# Patient Record
Sex: FEMALE | Race: WHITE | Employment: OTHER | URBAN - METROPOLITAN AREA
[De-identification: names, ages, dates, MRNs, and addresses within clinical notes are randomized per-mention and may not be internally consistent; named-entity substitution may affect disease eponyms.]

---

## 2017-01-13 PROBLEM — J44.9 COPD (CHRONIC OBSTRUCTIVE PULMONARY DISEASE) (HCC): Chronic | Status: ACTIVE | Noted: 2017-01-13

## 2017-10-08 PROBLEM — N39.0 COMPLICATED UTI (URINARY TRACT INFECTION): Status: ACTIVE | Noted: 2017-10-08

## 2018-06-04 ENCOUNTER — NURSE ONLY (OUTPATIENT)
Dept: NON INVASIVE DIAGNOSTICS | Age: 83
End: 2018-06-04
Payer: MEDICARE

## 2018-06-04 DIAGNOSIS — Z95.0 PACEMAKER: Primary | Chronic | ICD-10-CM

## 2018-06-04 PROCEDURE — 93294 REM INTERROG EVL PM/LDLS PM: CPT | Performed by: INTERNAL MEDICINE

## 2018-06-04 PROCEDURE — 93296 REM INTERROG EVL PM/IDS: CPT | Performed by: INTERNAL MEDICINE

## 2018-06-12 ENCOUNTER — TELEPHONE (OUTPATIENT)
Dept: NON INVASIVE DIAGNOSTICS | Age: 83
End: 2018-06-12

## 2018-07-24 ENCOUNTER — APPOINTMENT (OUTPATIENT)
Dept: CT IMAGING | Age: 83
DRG: 689 | End: 2018-07-24
Payer: MEDICARE

## 2018-07-24 ENCOUNTER — HOSPITAL ENCOUNTER (INPATIENT)
Age: 83
LOS: 5 days | Discharge: HOME HEALTH | DRG: 689 | End: 2018-07-29
Attending: EMERGENCY MEDICINE | Admitting: INTERNAL MEDICINE
Payer: MEDICARE

## 2018-07-24 DIAGNOSIS — R10.30 LOWER ABDOMINAL PAIN: ICD-10-CM

## 2018-07-24 DIAGNOSIS — N30.00 ACUTE CYSTITIS WITHOUT HEMATURIA: Primary | ICD-10-CM

## 2018-07-24 DIAGNOSIS — K86.89 PANCREATIC MASS: ICD-10-CM

## 2018-07-24 DIAGNOSIS — N39.0 URINARY TRACT INFECTION WITHOUT HEMATURIA, SITE UNSPECIFIED: ICD-10-CM

## 2018-07-24 PROBLEM — N30.90 CYSTITIS: Status: ACTIVE | Noted: 2018-07-24

## 2018-07-24 LAB
ALBUMIN SERPL-MCNC: 3.8 G/DL (ref 3.5–5.2)
ALP BLD-CCNC: 124 U/L (ref 35–104)
ALT SERPL-CCNC: 19 U/L (ref 0–32)
ANION GAP SERPL CALCULATED.3IONS-SCNC: 14 MMOL/L (ref 7–16)
AST SERPL-CCNC: 32 U/L (ref 0–31)
BACTERIA: ABNORMAL /HPF
BASOPHILS ABSOLUTE: 0.05 E9/L (ref 0–0.2)
BASOPHILS RELATIVE PERCENT: 0.4 % (ref 0–2)
BILIRUB SERPL-MCNC: 0.7 MG/DL (ref 0–1.2)
BILIRUBIN URINE: NEGATIVE
BLOOD, URINE: ABNORMAL
BUN BLDV-MCNC: 14 MG/DL (ref 8–23)
CALCIUM SERPL-MCNC: 9.1 MG/DL (ref 8.6–10.2)
CHLORIDE BLD-SCNC: 96 MMOL/L (ref 98–107)
CLARITY: CLEAR
CO2: 23 MMOL/L (ref 22–29)
COLOR: YELLOW
CREAT SERPL-MCNC: 0.8 MG/DL (ref 0.5–1)
EOSINOPHILS ABSOLUTE: 0.18 E9/L (ref 0.05–0.5)
EOSINOPHILS RELATIVE PERCENT: 1.5 % (ref 0–6)
GFR AFRICAN AMERICAN: >60
GFR NON-AFRICAN AMERICAN: >60 ML/MIN/1.73
GLUCOSE BLD-MCNC: 121 MG/DL (ref 74–109)
GLUCOSE URINE: NEGATIVE MG/DL
HCT VFR BLD CALC: 40 % (ref 34–48)
HEMOGLOBIN: 13.4 G/DL (ref 11.5–15.5)
IMMATURE GRANULOCYTES #: 0.08 E9/L
IMMATURE GRANULOCYTES %: 0.7 % (ref 0–5)
KETONES, URINE: NEGATIVE MG/DL
LACTIC ACID: 1.2 MMOL/L (ref 0.5–2.2)
LEUKOCYTE ESTERASE, URINE: ABNORMAL
LIPASE: 90 U/L (ref 13–60)
LYMPHOCYTES ABSOLUTE: 1.84 E9/L (ref 1.5–4)
LYMPHOCYTES RELATIVE PERCENT: 15.3 % (ref 20–42)
MCH RBC QN AUTO: 27.1 PG (ref 26–35)
MCHC RBC AUTO-ENTMCNC: 33.5 % (ref 32–34.5)
MCV RBC AUTO: 81 FL (ref 80–99.9)
MONOCYTES ABSOLUTE: 1.47 E9/L (ref 0.1–0.95)
MONOCYTES RELATIVE PERCENT: 12.2 % (ref 2–12)
NEUTROPHILS ABSOLUTE: 8.39 E9/L (ref 1.8–7.3)
NEUTROPHILS RELATIVE PERCENT: 69.9 % (ref 43–80)
NITRITE, URINE: POSITIVE
PDW BLD-RTO: 14.2 FL (ref 11.5–15)
PH UA: 6 (ref 5–9)
PLATELET # BLD: 251 E9/L (ref 130–450)
PMV BLD AUTO: 10.5 FL (ref 7–12)
POTASSIUM SERPL-SCNC: 4 MMOL/L (ref 3.5–5)
PROTEIN UA: ABNORMAL MG/DL
RBC # BLD: 4.94 E12/L (ref 3.5–5.5)
RBC UA: ABNORMAL /HPF (ref 0–2)
SODIUM BLD-SCNC: 133 MMOL/L (ref 132–146)
SPECIFIC GRAVITY UA: 1.01 (ref 1–1.03)
TOTAL PROTEIN: 7.2 G/DL (ref 6.4–8.3)
UROBILINOGEN, URINE: 0.2 E.U./DL
WBC # BLD: 12 E9/L (ref 4.5–11.5)
WBC UA: >20 /HPF (ref 0–5)

## 2018-07-24 PROCEDURE — 6360000002 HC RX W HCPCS: Performed by: INTERNAL MEDICINE

## 2018-07-24 PROCEDURE — 6370000000 HC RX 637 (ALT 250 FOR IP): Performed by: INTERNAL MEDICINE

## 2018-07-24 PROCEDURE — 87088 URINE BACTERIA CULTURE: CPT

## 2018-07-24 PROCEDURE — 2580000003 HC RX 258: Performed by: INTERNAL MEDICINE

## 2018-07-24 PROCEDURE — 99285 EMERGENCY DEPT VISIT HI MDM: CPT

## 2018-07-24 PROCEDURE — 97161 PT EVAL LOW COMPLEX 20 MIN: CPT

## 2018-07-24 PROCEDURE — 97530 THERAPEUTIC ACTIVITIES: CPT

## 2018-07-24 PROCEDURE — 6360000004 HC RX CONTRAST MEDICATION: Performed by: RADIOLOGY

## 2018-07-24 PROCEDURE — 97165 OT EVAL LOW COMPLEX 30 MIN: CPT

## 2018-07-24 PROCEDURE — 2580000003 HC RX 258: Performed by: EMERGENCY MEDICINE

## 2018-07-24 PROCEDURE — 85025 COMPLETE CBC W/AUTO DIFF WBC: CPT

## 2018-07-24 PROCEDURE — 94760 N-INVAS EAR/PLS OXIMETRY 1: CPT

## 2018-07-24 PROCEDURE — 1200000000 HC SEMI PRIVATE

## 2018-07-24 PROCEDURE — 83605 ASSAY OF LACTIC ACID: CPT

## 2018-07-24 PROCEDURE — G8987 SELF CARE CURRENT STATUS: HCPCS

## 2018-07-24 PROCEDURE — 96374 THER/PROPH/DIAG INJ IV PUSH: CPT

## 2018-07-24 PROCEDURE — G8988 SELF CARE GOAL STATUS: HCPCS

## 2018-07-24 PROCEDURE — 6360000002 HC RX W HCPCS: Performed by: EMERGENCY MEDICINE

## 2018-07-24 PROCEDURE — 83690 ASSAY OF LIPASE: CPT

## 2018-07-24 PROCEDURE — 87186 SC STD MICRODIL/AGAR DIL: CPT

## 2018-07-24 PROCEDURE — 74170 CT ABD WO CNTRST FLWD CNTRST: CPT

## 2018-07-24 PROCEDURE — 87040 BLOOD CULTURE FOR BACTERIA: CPT

## 2018-07-24 PROCEDURE — 80053 COMPREHEN METABOLIC PANEL: CPT

## 2018-07-24 PROCEDURE — 81001 URINALYSIS AUTO W/SCOPE: CPT

## 2018-07-24 PROCEDURE — 96375 TX/PRO/DX INJ NEW DRUG ADDON: CPT

## 2018-07-24 RX ORDER — FENTANYL CITRATE 50 UG/ML
50 INJECTION, SOLUTION INTRAMUSCULAR; INTRAVENOUS ONCE
Status: COMPLETED | OUTPATIENT
Start: 2018-07-24 | End: 2018-07-24

## 2018-07-24 RX ORDER — SODIUM CHLORIDE 0.9 % (FLUSH) 0.9 %
10 SYRINGE (ML) INJECTION EVERY 12 HOURS SCHEDULED
Status: DISCONTINUED | OUTPATIENT
Start: 2018-07-24 | End: 2018-07-29 | Stop reason: HOSPADM

## 2018-07-24 RX ORDER — ONDANSETRON 2 MG/ML
4 INJECTION INTRAMUSCULAR; INTRAVENOUS EVERY 6 HOURS PRN
Status: DISCONTINUED | OUTPATIENT
Start: 2018-07-24 | End: 2018-07-29 | Stop reason: HOSPADM

## 2018-07-24 RX ORDER — SODIUM CHLORIDE 0.9 % (FLUSH) 0.9 %
10 SYRINGE (ML) INJECTION PRN
Status: DISCONTINUED | OUTPATIENT
Start: 2018-07-24 | End: 2018-07-29 | Stop reason: HOSPADM

## 2018-07-24 RX ORDER — CEFDINIR 300 MG/1
300 CAPSULE ORAL ONCE
COMMUNITY
End: 2018-07-24

## 2018-07-24 RX ORDER — ONDANSETRON 2 MG/ML
4 INJECTION INTRAMUSCULAR; INTRAVENOUS ONCE
Status: COMPLETED | OUTPATIENT
Start: 2018-07-24 | End: 2018-07-24

## 2018-07-24 RX ORDER — LOSARTAN POTASSIUM 50 MG/1
100 TABLET ORAL DAILY
Status: DISCONTINUED | OUTPATIENT
Start: 2018-07-24 | End: 2018-07-29 | Stop reason: HOSPADM

## 2018-07-24 RX ORDER — PANTOPRAZOLE SODIUM 40 MG/1
40 TABLET, DELAYED RELEASE ORAL
Status: DISCONTINUED | OUTPATIENT
Start: 2018-07-25 | End: 2018-07-29 | Stop reason: HOSPADM

## 2018-07-24 RX ORDER — ACETAMINOPHEN 325 MG/1
650 TABLET ORAL EVERY 4 HOURS PRN
Status: DISCONTINUED | OUTPATIENT
Start: 2018-07-24 | End: 2018-07-29 | Stop reason: HOSPADM

## 2018-07-24 RX ORDER — LIDOCAINE HCL 4 %
1 CREAM (GRAM) TOPICAL DAILY PRN
COMMUNITY
End: 2018-11-19

## 2018-07-24 RX ORDER — SODIUM CHLORIDE 9 MG/ML
INJECTION, SOLUTION INTRAVENOUS CONTINUOUS
Status: DISCONTINUED | OUTPATIENT
Start: 2018-07-24 | End: 2018-07-28

## 2018-07-24 RX ORDER — MAGNESIUM HYDROXIDE/ALUMINUM HYDROXICE/SIMETHICONE 120; 1200; 1200 MG/30ML; MG/30ML; MG/30ML
15 SUSPENSION ORAL EVERY 6 HOURS PRN
Status: DISCONTINUED | OUTPATIENT
Start: 2018-07-24 | End: 2018-07-29 | Stop reason: HOSPADM

## 2018-07-24 RX ORDER — TRIAMTERENE AND HYDROCHLOROTHIAZIDE 37.5; 25 MG/1; MG/1
1 TABLET ORAL EVERY MORNING
Status: DISCONTINUED | OUTPATIENT
Start: 2018-07-24 | End: 2018-07-29 | Stop reason: HOSPADM

## 2018-07-24 RX ORDER — ALUMINA, MAGNESIA, AND SIMETHICONE 2400; 2400; 240 MG/30ML; MG/30ML; MG/30ML
30 SUSPENSION ORAL EVERY 6 HOURS PRN
COMMUNITY
End: 2020-01-25

## 2018-07-24 RX ORDER — ACETAMINOPHEN, ASPIRIN AND CAFFEINE 250; 250; 65 MG/1; MG/1; MG/1
1 TABLET, FILM COATED ORAL EVERY 8 HOURS PRN
Status: DISCONTINUED | OUTPATIENT
Start: 2018-07-24 | End: 2018-07-29 | Stop reason: HOSPADM

## 2018-07-24 RX ADMIN — FENTANYL CITRATE 50 MCG: 50 INJECTION, SOLUTION INTRAMUSCULAR; INTRAVENOUS at 08:58

## 2018-07-24 RX ADMIN — IOPAMIDOL 110 ML: 755 INJECTION, SOLUTION INTRAVENOUS at 08:34

## 2018-07-24 RX ADMIN — ENOXAPARIN SODIUM 40 MG: 40 INJECTION, SOLUTION INTRAVENOUS; SUBCUTANEOUS at 12:06

## 2018-07-24 RX ADMIN — ACETAMINOPHEN 650 MG: 325 TABLET ORAL at 22:25

## 2018-07-24 RX ADMIN — Medication 10 ML: at 12:05

## 2018-07-24 RX ADMIN — ONDANSETRON 4 MG: 2 INJECTION, SOLUTION INTRAMUSCULAR; INTRAVENOUS at 08:01

## 2018-07-24 RX ADMIN — SODIUM CHLORIDE 75 ML/HR: 9 INJECTION, SOLUTION INTRAVENOUS at 12:05

## 2018-07-24 RX ADMIN — CEFEPIME HYDROCHLORIDE 1 G: 1 INJECTION, POWDER, FOR SOLUTION INTRAMUSCULAR; INTRAVENOUS at 22:18

## 2018-07-24 RX ADMIN — TRIAMTERENE AND HYDROCHLOROTHIAZIDE 1 TABLET: 37.5; 25 TABLET ORAL at 13:42

## 2018-07-24 RX ADMIN — CEFEPIME HYDROCHLORIDE 1 G: 1 INJECTION, POWDER, FOR SOLUTION INTRAMUSCULAR; INTRAVENOUS at 10:41

## 2018-07-24 RX ADMIN — ACETAMINOPHEN 650 MG: 325 TABLET ORAL at 16:56

## 2018-07-24 ASSESSMENT — PAIN DESCRIPTION - ORIENTATION
ORIENTATION: MID
ORIENTATION: LOWER

## 2018-07-24 ASSESSMENT — ENCOUNTER SYMPTOMS
COUGH: 0
BACK PAIN: 0
EYE DISCHARGE: 0
SINUS PRESSURE: 0
EYE PAIN: 0
RHINORRHEA: 0
WHEEZING: 0
ABDOMINAL DISTENTION: 0
DIARRHEA: 0
ABDOMINAL PAIN: 1
BLOOD IN STOOL: 0
EYE REDNESS: 0
CONSTIPATION: 0
SHORTNESS OF BREATH: 0
VOMITING: 0
SORE THROAT: 0
NAUSEA: 1

## 2018-07-24 ASSESSMENT — PAIN DESCRIPTION - PROGRESSION
CLINICAL_PROGRESSION: NOT CHANGED
CLINICAL_PROGRESSION: NOT CHANGED

## 2018-07-24 ASSESSMENT — PAIN SCALES - GENERAL
PAINLEVEL_OUTOF10: 3
PAINLEVEL_OUTOF10: 0
PAINLEVEL_OUTOF10: 7
PAINLEVEL_OUTOF10: 7
PAINLEVEL_OUTOF10: 3
PAINLEVEL_OUTOF10: 3
PAINLEVEL_OUTOF10: 4

## 2018-07-24 ASSESSMENT — PAIN DESCRIPTION - FREQUENCY
FREQUENCY: INTERMITTENT
FREQUENCY: CONTINUOUS
FREQUENCY: INTERMITTENT
FREQUENCY: CONTINUOUS

## 2018-07-24 ASSESSMENT — PAIN DESCRIPTION - DESCRIPTORS
DESCRIPTORS: CONSTANT;BURNING
DESCRIPTORS: BURNING;ACHING
DESCRIPTORS: ACHING;BURNING;CONSTANT
DESCRIPTORS: ACHING;BURNING;STABBING
DESCRIPTORS: BURNING;ACHING;SHOOTING

## 2018-07-24 ASSESSMENT — PAIN DESCRIPTION - ONSET: ONSET: ON-GOING

## 2018-07-24 ASSESSMENT — PAIN DESCRIPTION - LOCATION
LOCATION: VAGINA;PELVIS
LOCATION: ABDOMEN
LOCATION: ABDOMEN;VAGINA
LOCATION: ABDOMEN;GROIN
LOCATION: ABDOMEN;VAGINA

## 2018-07-24 ASSESSMENT — PAIN DESCRIPTION - PAIN TYPE
TYPE: ACUTE PAIN

## 2018-07-24 NOTE — ED PROVIDER NOTES
Exam   Constitutional: She is oriented to person, place, and time. She appears well-developed and well-nourished. HENT:   Head: Normocephalic and atraumatic. Head is without raccoon's eyes and without Anderson's sign. Nose: No rhinorrhea or nasal deformity. Mouth/Throat: Uvula is midline, oropharynx is clear and moist and mucous membranes are normal.   Eyes: Conjunctivae and EOM are normal. Pupils are equal, round, and reactive to light. Neck: Trachea normal and normal range of motion. Neck supple. No JVD present. Cardiovascular: Normal rate, regular rhythm and normal heart sounds. Exam reveals no gallop. No murmur heard. Pulmonary/Chest: Effort normal and breath sounds normal. No respiratory distress. She has no wheezes. She has no rales. Abdominal: Soft. Bowel sounds are normal. There is tenderness in the left lower quadrant. There is CVA tenderness (Left-sided). There is no rigidity, no rebound and no guarding. Musculoskeletal: She exhibits no edema. Neurological: She is alert and oriented to person, place, and time. She has normal strength. No cranial nerve deficit or sensory deficit. Skin: Skin is warm and dry. Nursing note and vitals reviewed. Procedures    MDM  Number of Diagnoses or Management Options  Acute cystitis without hematuria:   Lower abdominal pain:   Pancreatic mass:   Urinary tract infection without hematuria, site unspecified:   Diagnosis management comments: Patient is found to have cystitis along with flank pain and multiple antibiotics including ciprofloxacin and Cefdinir within the past couple of months for UTIs. Cultures are obtained and she is started on antibiotic. She is admitted in stable condition for continued workup, treatment, and monitoring of her condition.               --------------------------------------------- PAST HISTORY ---------------------------------------------  Past Medical History:  has a past medical history of Arthritis;  Asthma; AVB right colectomy. 3. No obstructive uropathy or urinary stone. 4. Status post prior cholecystectomy and hysterectomy. 5. Small to moderate size hiatal hernia. 6. Diffuse hepatic steatosis. 7. 1 cm cystic structure of the pancreatic head could represent a   pancreatic cyst versus cystic mass. Follow-up is recommended. 8. Hyperplastic adrenal glands. ------------------------- NURSING NOTES AND VITALS REVIEWED ---------------------------  Date / Time Roomed:  7/24/2018  6:45 AM  ED Bed Assignment:  25/25    The nursing notes within the ED encounter and vital signs as below have been reviewed. Patient Vitals for the past 24 hrs:   BP Temp Pulse Resp SpO2 Height Weight   07/24/18 0949 (!) 104/57 - 72 16 97 % - -   07/24/18 0804 135/75 - 69 16 96 % - -   07/24/18 0651 (!) 173/77 97.6 °F (36.4 °C) 70 16 97 % 5' 1\" (1.549 m) 153 lb (69.4 kg)       Oxygen Saturation Interpretation: Normal    ------------------------------------------ PROGRESS NOTES ------------------------------------------  Re-evaluation(s):  Time: 7:42 AM  Patients symptoms show no change  Repeat physical examination is not changed    Time: 9:20 AM  Patients symptoms are improving  Repeat physical examination is not changed    Time: 10:39 AM  Patients symptoms show no change  Repeat physical examination is not changed    Counseling:  I have spoken with the patient and discussed todays results, in addition to providing specific details for the plan of care and counseling regarding the diagnosis and prognosis. Their questions are answered at this time and they are agreeable with the plan of admission.    --------------------------------- ADDITIONAL PROVIDER NOTES ---------------------------------  Consultations:  Time: 10:42 AM. Spoke with Dr. Karel Jonas. Discussed case. They will admit the patient.   This patient's ED course included: a personal history and physicial examination, multiple bedside re-evaluations, IV medications and continuous pulse oximetry    This patient has remained hemodynamically stable during their ED course. Diagnosis:  1. Acute cystitis without hematuria    2. Urinary tract infection without hematuria, site unspecified    3. Lower abdominal pain    4. Pancreatic mass        Disposition:  Patient's disposition: Admit to med/surg floor  Patient's condition is stable.          Matias Wilson DO  Resident  07/24/18 1043

## 2018-07-24 NOTE — CONSULTS
Consult to Infectious Disease  Consult performed by: Nany Robert ordered by: David Cani, [address] [facility]  [address]  Consultation Note     Admit Date: 7/24/2018  6:45 AM    Reason for Consult:   Complicated UTI    Attending Physician:  Clarissa Garza DO    HISTORY OF PRESENT ILLNESS:             The history is obtained from extensive review of available past medical records. The patient is a 80 y.o. female who is known to the ID service. The patient was treated by Dr. Subhash Davis for a complicated UTI secondary to MDR E. coli in October 2017. She was treated with Ertapenem. The patient was referred back to the office in March 2018 and was treated with Cefdinir. Patient was seen again in the office on 6/27/18 after she has finished a course of Ciprofloxacin for an abnormal urinalysis with pyuria. Patient was asymptomatic and was not given any antibiotics. The patient presents to the ED on 7/24/15 complaining of dysuria, characterized by frequency and burning on urination, together with suprapubic pain. She was admitted with a diagnosis of acute cystitis without hematuria and treated with Cefepime.     Past Medical History:        Diagnosis Date    Arthritis     Asthma     AVB (atrioventricular block) 6/20/2012    Cancer (HCC)     colon    Chest pain 7/11/2013    Chronic kidney disease, stage 3     begining of stage 3    Complicated UTI (urinary tract infection) 10/8/2017    COPD [facility]ch[facility]) [facility]     COPD (chronic obstructive pulmonary disease) ([facility] [address] 1/13/2017    GERD (gastroesophageal reflux disease)     Hypertension     uncontrolled BP    Symptomatic bradycardia      Past Surgical History:        Procedure Laterality Date    APPENDECTOMY      BACK SURGERY      CARDIAC CATHETERIZATION  02/10/2016    Dr. Sparkle Alonso single vessel disease    CHOLECYSTECTOMY      COLONOSCOPY      ECHO COMPLETE  6/20/2012         ENDOSCOPY, COLON, DIAGNOSTIC      HYSTERECTOMY      JOINT REPLACEMENT      left hip    KYPHOSIS SURGERY      PACEMAKER PLACEMENT  6-    [facility] dual chamber    TONSILLECTOMY       Current Medications:   Scheduled Meds:   cefepime  1 g Intravenous Once    sodium chloride flush  10 mL Intravenous 2 times per day    enoxaparin  40 mg Subcutaneous Daily    losartan  100 mg Oral Daily    [START ON 7/25/2018] pantoprazole  40 mg Oral QAM AC    triamterene-hydrochlorothiazide  1 tablet Oral QAM    [START ON 7/25/2018] vitamin D  1,000 Units Oral Daily with breakfast    cefepime  1 g Intravenous Q12H     Continuous Infusions:   sodium chloride 75 mL/hr (07/24/18 1205)     PRN Meds:sodium chloride flush, acetaminophen, aluminum & magnesium hydroxide-simethicone, aspirin-acetaminophen-caffeine, sodium chloride, magnesium hydroxide, ondansetron    Allergies:  Amlodipine; Augmentin [amoxicillin-pot clavulanate]; Bactrim; Benadryl [diphenhydramine]; Brovana [arformoterol]; Cardizem [diltiazem hcl]; Codeine; Doxazosin; Ipratropium; Macrodantin [nitrofurantoin macrocrystal]; and Verapamil    Social History:   Social History     Social History    Marital status:      Spouse name: N/A    Number of children: N/A    Years of education: N/A     Social History Main Topics    Smoking status: Former Smoker     Packs/day: 1.00     Years: 17.00     Types: Cigarettes     Start date: 7/24/1965     Quit date: 6/19/1982    Smokeless tobacco: Never Used    Alcohol use No    Drug use: No    Sexual activity: No     Other Topics Concern    None     Social History Narrative    None      Pets: No  Travel: No    Family History:   Family History   Problem Relation Age of Onset    Heart Disease Mother     Other Father    . Otherwise non-pertinent to the chief complaint.     REVIEW OF SYSTEMS:    Constitutional: Negative for fevers, chills, diaphoresis  Neurologic: Negative   Psychiatric: Negative  Rheumatologic: Negative Endocrine: Negative  Hematologic: Negative  Immunologic: Negative  ENT: Negative  Respiratory: Negative   Cardiovascular: Negative  GI: Negative  : As in the HPI  Musculoskeletal: Negative  Skin: No rashes. PHYSICAL EXAM:    Vitals:   /61   Pulse 67   Temp 97.6 °F (36.4 °C) (Oral)   Resp 18   Ht 5' 1\" (1.549 m)   Wt 153 lb (69.4 kg)   SpO2 98%   BMI 28.91 kg/m²   Constitutional: The patient is awake, alert, and oriented. Skin: Warm and dry. No rashes were noted. HEENT: Eyes show round, and reactive pupils. No jaundice. Moist mucous membranes, no ulcerations, no thrush. Neck: Supple to movements. No lymphadenopathy. Chest: No use of accessory muscles to breathe. Symmetrical expansion. Auscultation reveals no wheezing, crackles, or rhonchi. Cardiovascular: S1 and S2 are rhythmic and regular. No murmurs appreciated. Abdomen: Positive bowel sounds to auscultation. Suprapubic discomfort noted. Back: No CVA tenderness. Extremities: No clubbing, no cyanosis, no edema.   Lines: peripheral      CBC+dif:  Recent Labs      07/24/18   0728   WBC  12.0*   HGB  13.4   HCT  40.0   MCV  81.0   PLT  251   NEUTROABS  8.39*     No results found for: CRP   No results found for: CRPHS  No results found for: SEDRATE  Lab Results   Component Value Date    ALT 19 07/24/2018    AST 32 (H) 07/24/2018    ALKPHOS 124 (H) 07/24/2018    BILITOT 0.7 07/24/2018     Lab Results   Component Value Date     07/24/2018    K 4.0 07/24/2018    CL 96 07/24/2018    CO2 23 07/24/2018    BUN 14 07/24/2018    CREATININE 0.8 07/24/2018    GFRAA >60 07/24/2018    LABGLOM >60 07/24/2018    GLUCOSE 121 07/24/2018    PROT 7.2 07/24/2018    LABALBU 3.8 07/24/2018    CALCIUM 9.1 07/24/2018    BILITOT 0.7 07/24/2018    ALKPHOS 124 07/24/2018    AST 32 07/24/2018    ALT 19 07/24/2018       Lab Results   Component Value Date    PROTIME 11.6 07/29/2016    PROTIME 17.3 05/13/2012    INR 1.0 07/29/2016       Lab Results   Component Value Date    TSH 1.808 06/20/2012       Lab Results   Component Value Date    COLORU Yellow 07/24/2018    PHUR 6.0 07/24/2018    WBCUA >20 07/24/2018    RBCUA 1-3 07/24/2018    RBCUA 0-1 07/10/2013    BACTERIA MANY 07/24/2018    CLARITYU Clear 07/24/2018    SPECGRAV 1.010 07/24/2018    LEUKOCYTESUR LARGE 07/24/2018    UROBILINOGEN 0.2 07/24/2018    BILIRUBINUR Negative 07/24/2018    BLOODU SMALL 07/24/2018    GLUCOSEU Negative 07/24/2018       Radiology:  Noted    Microbiology:  Pending  No results for input(s): BC in the last 72 hours. No results for input(s): ORG in the last 72 hours. No results for input(s): Jaime Friends in the last 72 hours. No results for input(s): STREPNEUMAGU in the last 72 hours. No results for input(s): LP1UAG in the last 72 hours. No results for input(s): ASO in the last 72 hours. No results for input(s): CULTRESP in the last 72 hours. Assessment:  · Complicated UTI with sepsis  · Leukocytosis associated to the above    Plan:    · Continue Cefepime for now  · Check cultures, baseline ESR, CRP  · Monitor labs  · Will follow with you    Thank you for having us see this patient in consultation. I will be discussing this case with the treating physicians.     Jonah Ala  2:27 PM  7/24/2018

## 2018-07-24 NOTE — PROGRESS NOTES
Occupational Therapy  OCCUPATIONAL THERAPY INITIAL EVALUATION      Date:2018  Patient Name: Cindra Mcburney  MRN: [id number]  : 1935  Room: [address] [place] [address]     Evaluating OT: Billye Opitz OTR/L       Recommended Adaptive Equipment: TBA     AM-PAC Daily Activity Raw Score:   G Code:  Cl    Diagnosis: Cystitis. Past Medical History: COPD,   Precautions: Falls, [place]      Home Living/PLOF:   Patient lives alone. 2 story with 1st floor set-up. 4 steps/2 rails  PTA: Independent, rollator occasionally. Driving     Pain Level: pt c/o groin pain  this session     Hearing: [place]   Vision: WFLS      Cognition: oriented x 3    UE Assessment:  Hand Dominance: Right [x]  Left []    UE AROM and strength WFLs      Functional Assessment:   Initial Eval Status 18  Comments   Feeding  Set-up     Grooming  Set-up   Standing at sink washing hands    Upper Body Dressing Set-up      Lower Body Dressing SBA/set-up     Bathing     Toileting  Independent     Bed Mobility  Supine to Sit: Independent       Functional Transfers SBA   Sit-stand from bed      Functional Mobility SBA ,w/walker  Ambulating to/from bathroom       Sit balance: Independent   Stand balance: SBA   Endurance/Activity tolerance: Tolerant of func eval   Sensation: WFLS                                Comments: Upon arrival pt lying in bed . At end of session pt sitting in chair  with all devices within reach, all lines and tubes intact. Call light within reach.   Bed/chair alarm on        Assessment of current deficits    Functional mobility [x]  ROM [] Strength [x]  Cognition []  ADLs [x]   IADLs [] Safety Awareness [x] Endurance [x]  Fine Motor Coordination [] Balance [x] Vision/perception [] Sensation []   Gross Motor Coordination []     Treatment frequency:2-5x/week     Plan of Care:   ADL retraining [x]   Equipment needs [x]   Neuromuscular re-education [] Energy Conservation Techniques []  Functional Transfer training [x] Patient and/or Family Education [x]  Functional Mobility training [x]  Environmental Modifications []  Cognitive re-training []   Compensatory techniques for ADLs []  Splinting Needs []                             Therapeutic Activities [x]  Positioning/joint protection []             Therapeutic Strengthening  [x]      Other: []      Rehab Potential: Good    Patient / Family Goal: Return Home     Short term goals  Time Frame: 2 weeks     1. Func xfers Mod I   2. Func mobility Mod I with good tolerance   3. Perform LE Dressing Mod I   4. Perform grooming task at standing level Mod I     Eval Complexity: Low     Patient and/or family educated on safety awareness .  Patient and/or family understands OT  plan of care: NO family present     Time in: 26  Time out: [address] OTR/L [id number]

## 2018-07-25 LAB
ANION GAP SERPL CALCULATED.3IONS-SCNC: 11 MMOL/L (ref 7–16)
BASOPHILS ABSOLUTE: 0.06 E9/L (ref 0–0.2)
BASOPHILS RELATIVE PERCENT: 0.6 % (ref 0–2)
BUN BLDV-MCNC: 17 MG/DL (ref 8–23)
C-REACTIVE PROTEIN: 2 MG/DL (ref 0–0.4)
CALCIUM SERPL-MCNC: 8.6 MG/DL (ref 8.6–10.2)
CHLORIDE BLD-SCNC: 102 MMOL/L (ref 98–107)
CO2: 22 MMOL/L (ref 22–29)
CREAT SERPL-MCNC: 0.8 MG/DL (ref 0.5–1)
EOSINOPHILS ABSOLUTE: 0.22 E9/L (ref 0.05–0.5)
EOSINOPHILS RELATIVE PERCENT: 2 % (ref 0–6)
GFR AFRICAN AMERICAN: >60
GFR NON-AFRICAN AMERICAN: >60 ML/MIN/1.73
GLUCOSE BLD-MCNC: 131 MG/DL (ref 74–109)
HCT VFR BLD CALC: 36.2 % (ref 34–48)
HEMOGLOBIN: 12.4 G/DL (ref 11.5–15.5)
IMMATURE GRANULOCYTES #: 0.04 E9/L
IMMATURE GRANULOCYTES %: 0.4 % (ref 0–5)
LYMPHOCYTES ABSOLUTE: 1.96 E9/L (ref 1.5–4)
LYMPHOCYTES RELATIVE PERCENT: 18.1 % (ref 20–42)
MCH RBC QN AUTO: 27.8 PG (ref 26–35)
MCHC RBC AUTO-ENTMCNC: 34.3 % (ref 32–34.5)
MCV RBC AUTO: 81.2 FL (ref 80–99.9)
MONOCYTES ABSOLUTE: 1.35 E9/L (ref 0.1–0.95)
MONOCYTES RELATIVE PERCENT: 12.4 % (ref 2–12)
NEUTROPHILS ABSOLUTE: 7.22 E9/L (ref 1.8–7.3)
NEUTROPHILS RELATIVE PERCENT: 66.5 % (ref 43–80)
PDW BLD-RTO: 14.6 FL (ref 11.5–15)
PLATELET # BLD: 242 E9/L (ref 130–450)
PMV BLD AUTO: 9.9 FL (ref 7–12)
POTASSIUM REFLEX MAGNESIUM: 3.6 MMOL/L (ref 3.5–5)
RBC # BLD: 4.46 E12/L (ref 3.5–5.5)
SEDIMENTATION RATE, ERYTHROCYTE: 21 MM/HR (ref 0–20)
SODIUM BLD-SCNC: 135 MMOL/L (ref 132–146)
WBC # BLD: 10.9 E9/L (ref 4.5–11.5)

## 2018-07-25 PROCEDURE — 87088 URINE BACTERIA CULTURE: CPT

## 2018-07-25 PROCEDURE — 6370000000 HC RX 637 (ALT 250 FOR IP): Performed by: INTERNAL MEDICINE

## 2018-07-25 PROCEDURE — 6360000002 HC RX W HCPCS: Performed by: INTERNAL MEDICINE

## 2018-07-25 PROCEDURE — 97530 THERAPEUTIC ACTIVITIES: CPT

## 2018-07-25 PROCEDURE — 86140 C-REACTIVE PROTEIN: CPT

## 2018-07-25 PROCEDURE — 2580000003 HC RX 258: Performed by: INTERNAL MEDICINE

## 2018-07-25 PROCEDURE — 80048 BASIC METABOLIC PNL TOTAL CA: CPT

## 2018-07-25 PROCEDURE — 85651 RBC SED RATE NONAUTOMATED: CPT

## 2018-07-25 PROCEDURE — 36415 COLL VENOUS BLD VENIPUNCTURE: CPT

## 2018-07-25 PROCEDURE — 97535 SELF CARE MNGMENT TRAINING: CPT

## 2018-07-25 PROCEDURE — 85025 COMPLETE CBC W/AUTO DIFF WBC: CPT

## 2018-07-25 PROCEDURE — 1200000000 HC SEMI PRIVATE

## 2018-07-25 RX ADMIN — SODIUM CHLORIDE 75 ML/HR: 9 INJECTION, SOLUTION INTRAVENOUS at 15:22

## 2018-07-25 RX ADMIN — TRIAMTERENE AND HYDROCHLOROTHIAZIDE 1 TABLET: 37.5; 25 TABLET ORAL at 08:39

## 2018-07-25 RX ADMIN — CEFEPIME HYDROCHLORIDE 1 G: 1 INJECTION, POWDER, FOR SOLUTION INTRAMUSCULAR; INTRAVENOUS at 10:49

## 2018-07-25 RX ADMIN — ACETAMINOPHEN 650 MG: 325 TABLET ORAL at 14:13

## 2018-07-25 RX ADMIN — LOSARTAN POTASSIUM 50 MG: 50 TABLET, FILM COATED ORAL at 20:35

## 2018-07-25 RX ADMIN — ACETAMINOPHEN 650 MG: 325 TABLET ORAL at 22:11

## 2018-07-25 RX ADMIN — PANTOPRAZOLE SODIUM 40 MG: 40 TABLET, DELAYED RELEASE ORAL at 06:55

## 2018-07-25 RX ADMIN — VITAMIN D, TAB 1000IU (100/BT) 1000 UNITS: 25 TAB at 08:38

## 2018-07-25 RX ADMIN — ACETAMINOPHEN 650 MG: 325 TABLET ORAL at 04:20

## 2018-07-25 RX ADMIN — ENOXAPARIN SODIUM 40 MG: 40 INJECTION, SOLUTION INTRAVENOUS; SUBCUTANEOUS at 08:39

## 2018-07-25 RX ADMIN — CEFEPIME HYDROCHLORIDE 1 G: 1 INJECTION, POWDER, FOR SOLUTION INTRAMUSCULAR; INTRAVENOUS at 22:11

## 2018-07-25 ASSESSMENT — PAIN DESCRIPTION - LOCATION
LOCATION: GROIN
LOCATION: ABDOMEN;PELVIS;VAGINA
LOCATION: BACK

## 2018-07-25 ASSESSMENT — PAIN DESCRIPTION - PAIN TYPE
TYPE: CHRONIC PAIN
TYPE: ACUTE PAIN
TYPE: ACUTE PAIN

## 2018-07-25 ASSESSMENT — PAIN DESCRIPTION - DESCRIPTORS
DESCRIPTORS: ACHING;BURNING;SHARP
DESCRIPTORS: ACHING;BURNING;SHARP
DESCRIPTORS: ACHING;SPASM

## 2018-07-25 ASSESSMENT — PAIN DESCRIPTION - ORIENTATION
ORIENTATION: LOWER
ORIENTATION: LOWER

## 2018-07-25 ASSESSMENT — PAIN DESCRIPTION - PROGRESSION: CLINICAL_PROGRESSION: GRADUALLY IMPROVING

## 2018-07-25 ASSESSMENT — PAIN SCALES - GENERAL
PAINLEVEL_OUTOF10: 0
PAINLEVEL_OUTOF10: 4
PAINLEVEL_OUTOF10: 0
PAINLEVEL_OUTOF10: 0

## 2018-07-25 ASSESSMENT — PAIN DESCRIPTION - FREQUENCY
FREQUENCY: INTERMITTENT
FREQUENCY: INTERMITTENT

## 2018-07-25 ASSESSMENT — PAIN DESCRIPTION - ONSET: ONSET: ON-GOING

## 2018-07-25 ASSESSMENT — PAIN DESCRIPTION - DIRECTION: RADIATING_TOWARDS: BLADDER

## 2018-07-25 NOTE — H&P
History and Physical      CHIEF COMPLAINT:  abdominal pain       HISTORY OF PRESENT ILLNESS:      The patient is a 80 y.o. female patient of Dr Savannah Ferraro who presents with abdominal pain and dysuria. She states that approximately one week ago she began to develop lower abdominal pain described intermittently as dull and sharp associated with left back pain and dysuria. She also admits to urinary frequency. She has had nausea but no vomiting. She has felt warm but denies any actual fever or chills. She has had recurrent urinary tract infections over the last 2 years. Chief suffers from a bladder prolapse which was complicated by a previous bowel resection. She did see a urologist 2 years ago but did not want to pursue any surgical intervention. Last year she was treated for a multidrug resistant E. coli urinary tract infection by infectious disease. She was seen again in March of this year and treated with oral antibiotic and again in June she was treated with ciprofloxacin.     Past Medical History:    Past Medical History:   Diagnosis Date    Arthritis     Asthma     AVB (atrioventricular block) 6/20/2012    Cancer (HCC)     colon    Chest pain 7/11/2013    Chronic kidney disease, stage 3     begining of stage 3    Complicated UTI (urinary tract infection) 10/8/2017    [facility]     COPD (chronic obstructive pulmonary disease) ([facility] 1/13/2017    GERD (gastroesophageal reflux disease)     Hypertension     uncontrolled BP    Symptomatic bradycardia        Past Surgical History:    Past Surgical History:   Procedure Laterality Date    APPENDECTOMY      BACK SURGERY      CARDIAC CATHETERIZATION  02/10/2016    Dr. Reyna Stone single vessel disease    CHOLECYSTECTOMY      COLONOSCOPY      ECHO COMPLETE  6/20/2012         ENDOSCOPY, COLON, DIAGNOSTIC      HYSTERECTOMY      JOINT REPLACEMENT      left hip    KYPHOSIS SURGERY      PACEMAKER PLACEMENT  6- [facility] dual chamber    TONSILLECTOMY         Medications Prior to Admission:    Prescriptions Prior to Admission: aluminum & magnesium hydroxide-simethicone (MYLANTA) 400-400-40 MG/5ML SUSP, Take 30 mLs by mouth every 6 hours as needed  Cranberry-Vitamin C (AZO CRANBERRY URINARY TRACT) 250-60 MG CAPS, Take 1 capsule by mouth daily as needed  phenazopyridine (AZO URINARY PAIN) 97.5 MG TABS, Take 1 tablet by mouth daily as needed  triamterene-hydrochlorothiazide (MAXZIDE-25) 37.5-25 MG per tablet, Take 1 tablet by mouth every morning   aspirin-acetaminophen-caffeine (EXCEDRIN MIGRAINE) 250-250-65 MG per tablet, Take 0.25 tablets by mouth every 8 hours as needed for Headaches or Pain   sodium chloride (OCEAN, BABY AYR) 0.65 % nasal spray, 1 spray by Nasal route as needed for Congestion  irbesartan (AVAPRO) 300 MG tablet, Take 1 tablet by mouth nightly for 30 doses (Patient taking differently: Take 150-300 mg by mouth nightly )  omeprazole (PRILOSEC) 20 MG capsule, Take 40 mg by mouth daily as needed. vitamin D (CHOLECALCIFEROL) 1000 UNIT TABS tablet, Take 1,000 Units by mouth daily (with breakfast)     Allergies:    Amlodipine; Augmentin [amoxicillin-pot clavulanate]; Bactrim; Benadryl [diphenhydramine]; Brovana [arformoterol]; Cardizem [diltiazem hcl]; Codeine; Doxazosin; Ipratropium; Macrodantin [nitrofurantoin macrocrystal]; and Verapamil    Social History:    reports that she quit smoking about 36 years ago. Her smoking use included Cigarettes. She started smoking about 53 years ago. She has a 17.00 pack-year smoking history. She has never used smokeless tobacco. She reports that she does not drink alcohol or use drugs. Family History:   family history includes Heart Disease in her mother; Other in her father.     REVIEW OF SYSTEMS    Constitutional: negative for chills and fevers  Eyes: negative for icterus and redness  Ears, nose, mouth, throat, and face: negative for epistaxis, hearing loss, nasal 07/25/2018    RBC 4.46 07/25/2018    HGB 12.4 07/25/2018    HCT 36.2 07/25/2018     07/25/2018    MCV 81.2 07/25/2018    MCH 27.8 07/25/2018    MCHC 34.3 07/25/2018    RDW 14.6 07/25/2018    SEGSPCT 57 07/10/2013    LYMPHOPCT 18.1 07/25/2018    MONOPCT 12.4 07/25/2018    BASOPCT 0.6 07/25/2018    MONOSABS 1.35 07/25/2018    LYMPHSABS 1.96 07/25/2018    EOSABS 0.22 07/25/2018    BASOSABS 0.06 07/25/2018   Results for ED MIRANDA (MRN [id number]) as of 7/25/2018 12:00   Ref. Range 7/24/2018 07:28   WBC Latest Ref Range: 4.5 - 11.5 E9/L 12.0 (H)   RBC Latest Ref Range: 3.50 - 5.50 E12/L 4.94   Hemoglobin Quant Latest Ref Range: 11.5 - 15.5 g/dL 13.4   Hematocrit Latest Ref Range: 34.0 - 48.0 % 40.0   MCV Latest Ref Range: 80.0 - 99.9 fL 81.0   MCH Latest Ref Range: 26.0 - 35.0 pg 27.1   MCHC Latest Ref Range: 32.0 - 34.5 % 33.5   MPV Latest Ref Range: 7.0 - 12.0 fL 10.5   RDW Latest Ref Range: 11.5 - 15.0 fL 14.2   Platelet Count Latest Ref Range: 130 - 450 E9/L 251     CMP:    Lab Results   Component Value Date     07/25/2018    K 3.6 07/25/2018     07/25/2018    CO2 22 07/25/2018    BUN 17 07/25/2018    CREATININE 0.8 07/25/2018    GFRAA >60 07/25/2018    LABGLOM >60 07/25/2018    GLUCOSE 131 07/25/2018    PROT 7.2 07/24/2018    LABALBU 3.8 07/24/2018    CALCIUM 8.6 07/25/2018    BILITOT 0.7 07/24/2018    ALKPHOS 124 07/24/2018    AST 32 07/24/2018    ALT 19 07/24/2018   Results for ED MIRANDA (MRN [id number]) as of 7/25/2018 12:00   Ref.  Range 7/24/2018 07:28   Sodium Latest Ref Range: 132 - 146 mmol/L 133   Potassium Latest Ref Range: 3.5 - 5.0 mmol/L 4.0   Chloride Latest Ref Range: 98 - 107 mmol/L 96 (L)   CO2 Latest Ref Range: 22 - 29 mmol/L 23   BUN Latest Ref Range: 8 - 23 mg/dL 14   Creatinine Latest Ref Range: 0.5 - 1.0 mg/dL 0.8   Anion Gap Latest Ref Range: 7 - 16 mmol/L 14   GFR Non- Latest Ref Range: >=60 mL/min/1.73 >60   GFR African American Unknown >60   Lactic Acid Latest Ref Range: 0.5 - 2.2 mmol/L 1.2   Glucose Latest Ref Range: 74 - 109 mg/dL 121 (H)   Calcium Latest Ref Range: 8.6 - 10.2 mg/dL 9.1   Total Protein Latest Ref Range: 6.4 - 8.3 g/dL 7.2     Hepatic Function Panel:    Lab Results   Component Value Date    ALKPHOS 124 2018    ALT 19 2018    AST 32 2018    PROT 7.2 2018    BILITOT 0.7 2018    BILIDIR <0.2 2016    IBILI 0.1 2016    LABALBU 3.8 2018     U/A:    Lab Results   Component Value Date    COLORU Yellow 2018    PROTEINU TRACE 2018    PHUR 6.0 2018    WBCUA >20 2018    RBCUA 1-3 2018    RBCUA 0-1 07/10/2013    BACTERIA MANY 2018    CLARITYU Clear 2018    SPECGRAV 1.010 2018    LEUKOCYTESUR LARGE 2018    UROBILINOGEN 0.2 2018    BILIRUBINUR Negative 2018    BLOODU SMALL 2018    GLUCOSEU Negative 2018     Narrative   Patient MRN:  [id number]   : 1935   Age: 80 years   Gender: Female       Order Date:  2018 7:30 AM       EXAM: CT ABDOMEN W WO CONTRAST       NUMBER OF IMAGES: [id number]       INDICATION:  Patient is a and 24-year-old woman with history of colon   cancer, chronic renal disease, UTI, abdominal pain         COMPARISON: None       TECHNIQUE: Multiple axial images were obtained from the dome of the   diaphragm through the pubic symphysis with and without administration   of IV contrast. No oral contrast was given. Sagittal and coronal   reconstructions were performed. This study was performed on CT scanner   with dose reduction technique. Low-dose CT  acquisition technique   included one of following options; 1 . Automated exposure control, 2. Adjustment of MA and or KV according to patient's size or 3. Use of   iterative reconstruction.       FINDINGS: Mild to moderate centrilobular emphysema of the visualized   lower lungs. Mild bibasilar atelectasis.  Distal end of the cardiac   pacer leads are noted in the right

## 2018-07-25 NOTE — CARE COORDINATION
Social work / Discharge Planning:       Social work met with patient for initial assessment. She lives alone in a two story home and uses a walker as needed. Patient has used home care but cannot recall which one and had a past stay at [facility]. AM PAC 18/24. Social work discussed the option of home care which she refused. Patient denies having any discharge needs at this time.  Electronically signed by JUDIT Ngo on 7/25/2018 at 12:54 PM

## 2018-07-25 NOTE — PROGRESS NOTES
[address] [place] [address] [facility]  Progress Note    SUBJECTIVE:  Chief Complaint   Patient presents with    Abdominal Pain     started over the weekend, lower abdomen per patient she thinks it is from UTI which is a chronic issue for her    Dysuria     burning upon urination     Patient is tolerating medications. No reported adverse drug reactions. No nausea, vomiting, diarrhea. Sitting up in chair, visitor at bedside. Patient states that she feels better. Review of systems:  ROS as above, otherwise negative. Medications:  Scheduled Meds:   sodium chloride flush  10 mL Intravenous 2 times per day    enoxaparin  40 mg Subcutaneous Daily    losartan  100 mg Oral Daily    pantoprazole  40 mg Oral QAM AC    triamterene-hydrochlorothiazide  1 tablet Oral QAM    vitamin D  1,000 Units Oral Daily with breakfast    cefepime  1 g Intravenous Q12H     Continuous Infusions:   sodium chloride 75 mL/hr (18 1205)     PRN Meds:sodium chloride flush, acetaminophen, aluminum & magnesium hydroxide-simethicone, aspirin-acetaminophen-caffeine, sodium chloride, magnesium hydroxide, ondansetron    OBJECTIVE:  /75   Pulse 72   Temp 98.1 °F (36.7 °C) (Oral)   Resp 18   Ht 5' 1\" (1.549 m)   Wt 155 lb 11.2 oz (70.6 kg)   SpO2 98%   BMI 29.42 kg/m²   Temp  Av.8 °F (36.6 °C)  Min: 97.4 °F (36.3 °C)  Max: 98.2 °F (36.8 °C)  Constitutional: The patient is awake, alert, and oriented. Skin: Warm and dry. No rashes were noted. HEENT: Eyes show round, and reactive pupils. No jaundice. Moist mucous membranes, no ulcerations, no thrush. Neck: Supple to movements. Chest: No use of accessory muscles to breathe. Symmetrical expansion. Auscultation reveals no wheezing, crackles, or rhonchi. Cardiovascular: S1 and S2 are rhythmic and regular. No murmurs appreciated. Abdomen: Positive bowel sounds to auscultation. Mild tenderness to palpation. No masses felt.  No hepatosplenomegaly. Extremities: No clubbing, no cyanosis, no edema. Lines: peripheral    Laboratory and Tests Review:  Lab Results   Component Value Date    WBC 10.9 07/25/2018    WBC 12.0 (H) 07/24/2018    WBC 9.5 10/09/2017    HGB 12.4 07/25/2018    HCT 36.2 07/25/2018    MCV 81.2 07/25/2018     07/25/2018     Lab Results   Component Value Date    NEUTROABS 7.22 07/25/2018    NEUTROABS 8.39 (H) 07/24/2018    NEUTROABS 9.03 (H) 10/06/2017     Lab Results   Component Value Date    CRP 2.0 (H) 07/25/2018     No results found for: CRP  Lab Results   Component Value Date    SEDRATE 21 (H) 07/25/2018     Lab Results   Component Value Date    ALT 19 07/24/2018    AST 32 (H) 07/24/2018    ALKPHOS 124 (H) 07/24/2018    BILITOT 0.7 07/24/2018     Lab Results   Component Value Date     07/25/2018    K 3.6 07/25/2018     07/25/2018    CO2 22 07/25/2018    BUN 17 07/25/2018    CREATININE 0.8 07/25/2018    CREATININE 0.8 07/24/2018    CREATININE 0.8 10/09/2017    GFRAA >60 07/25/2018    LABGLOM >60 07/25/2018    GLUCOSE 131 07/25/2018    PROT 7.2 07/24/2018    LABALBU 3.8 07/24/2018    CALCIUM 8.6 07/25/2018    BILITOT 0.7 07/24/2018    ALKPHOS 124 07/24/2018    AST 32 07/24/2018    ALT 19 07/24/2018     Radiology:  7/24/18 CT abd/pelvis:   1. Mild to moderate diffuse urinary bladder wall thickening and minimal adjacent fatty stranding possibly representing cystitis. Correlation with UA results is recommended. 2. No bowel obstruction or ileus. Status post prior right colectomy. 3. No obstructive uropathy or urinary stone. 4. Status post prior cholecystectomy and hysterectomy. 5. Small to moderate size hiatal hernia. 6. Diffuse hepatic steatosis. 7. 1 cm cystic structure of the pancreatic head could represent a pancreatic cyst versus cystic mass. Follow-up is recommended. 8. Hyperplastic adrenal glands.     Microbiology:   No new cultures  7/24/18 BC pending  Urine was ordered but not sent, spoke

## 2018-07-25 NOTE — PLAN OF CARE
Problem: Falls - Risk of:  Goal: Will remain free from falls  Will remain free from falls   Outcome: Met This Shift    Goal: Absence of physical injury  Absence of physical injury   Outcome: Met This Shift      Problem: Pain  Goal: Pain level will decrease  Pain level will decrease      Outcome: Met This Shift      Problem: Pain:  Goal: Control of acute pain  Control of acute pain   Outcome: Met This Shift    Goal: Control of chronic pain  Control of chronic pain   Outcome: Met This Shift    Goal: Pain level will decrease  Pain level will decrease      Outcome: Met This Shift      Problem: Risk for Impaired Skin Integrity  Goal: Tissue integrity - skin and mucous membranes  Structural intactness and normal physiological function of skin and  mucous membranes.    Outcome: Met This Shift

## 2018-07-26 LAB
BACTERIA: ABNORMAL /HPF
BILIRUBIN URINE: NEGATIVE
BLOOD, URINE: ABNORMAL
CLARITY: ABNORMAL
COLOR: YELLOW
EPITHELIAL CELLS, UA: ABNORMAL /HPF
GLUCOSE URINE: NEGATIVE MG/DL
KETONES, URINE: NEGATIVE MG/DL
LEUKOCYTE ESTERASE, URINE: ABNORMAL
NITRITE, URINE: NEGATIVE
PH UA: 6 (ref 5–9)
PROTEIN UA: NEGATIVE MG/DL
RBC UA: ABNORMAL /HPF (ref 0–2)
SPECIFIC GRAVITY UA: 1.01 (ref 1–1.03)
UROBILINOGEN, URINE: 0.2 E.U./DL
WBC UA: >20 /HPF (ref 0–5)

## 2018-07-26 PROCEDURE — 6360000002 HC RX W HCPCS: Performed by: INTERNAL MEDICINE

## 2018-07-26 PROCEDURE — 81001 URINALYSIS AUTO W/SCOPE: CPT

## 2018-07-26 PROCEDURE — 1200000000 HC SEMI PRIVATE

## 2018-07-26 PROCEDURE — 2580000003 HC RX 258: Performed by: INTERNAL MEDICINE

## 2018-07-26 PROCEDURE — 6370000000 HC RX 637 (ALT 250 FOR IP): Performed by: INTERNAL MEDICINE

## 2018-07-26 PROCEDURE — 88112 CYTOPATH CELL ENHANCE TECH: CPT

## 2018-07-26 PROCEDURE — 97530 THERAPEUTIC ACTIVITIES: CPT

## 2018-07-26 RX ADMIN — Medication 10 ML: at 08:24

## 2018-07-26 RX ADMIN — ACETAMINOPHEN 650 MG: 325 TABLET ORAL at 19:37

## 2018-07-26 RX ADMIN — ENOXAPARIN SODIUM 40 MG: 40 INJECTION, SOLUTION INTRAVENOUS; SUBCUTANEOUS at 08:23

## 2018-07-26 RX ADMIN — CEFEPIME HYDROCHLORIDE 1 G: 1 INJECTION, POWDER, FOR SOLUTION INTRAMUSCULAR; INTRAVENOUS at 11:06

## 2018-07-26 RX ADMIN — ACETAMINOPHEN 650 MG: 325 TABLET ORAL at 02:15

## 2018-07-26 RX ADMIN — SODIUM CHLORIDE: 9 INJECTION, SOLUTION INTRAVENOUS at 02:15

## 2018-07-26 RX ADMIN — PANTOPRAZOLE SODIUM 40 MG: 40 TABLET, DELAYED RELEASE ORAL at 06:08

## 2018-07-26 RX ADMIN — CEFEPIME HYDROCHLORIDE 1 G: 1 INJECTION, POWDER, FOR SOLUTION INTRAMUSCULAR; INTRAVENOUS at 22:16

## 2018-07-26 RX ADMIN — LOSARTAN POTASSIUM 50 MG: 50 TABLET, FILM COATED ORAL at 19:37

## 2018-07-26 RX ADMIN — ACETAMINOPHEN 650 MG: 325 TABLET ORAL at 08:29

## 2018-07-26 RX ADMIN — VITAMIN D, TAB 1000IU (100/BT) 1000 UNITS: 25 TAB at 08:24

## 2018-07-26 RX ADMIN — TRIAMTERENE AND HYDROCHLOROTHIAZIDE 1 TABLET: 37.5; 25 TABLET ORAL at 08:24

## 2018-07-26 ASSESSMENT — PAIN DESCRIPTION - ONSET
ONSET: ON-GOING
ONSET: ON-GOING

## 2018-07-26 ASSESSMENT — PAIN DESCRIPTION - PROGRESSION
CLINICAL_PROGRESSION: NOT CHANGED
CLINICAL_PROGRESSION: GRADUALLY WORSENING

## 2018-07-26 ASSESSMENT — PAIN SCALES - GENERAL
PAINLEVEL_OUTOF10: 0
PAINLEVEL_OUTOF10: 0
PAINLEVEL_OUTOF10: 2
PAINLEVEL_OUTOF10: 4
PAINLEVEL_OUTOF10: 3
PAINLEVEL_OUTOF10: 4
PAINLEVEL_OUTOF10: 4

## 2018-07-26 ASSESSMENT — PAIN DESCRIPTION - DIRECTION
RADIATING_TOWARDS: BLADDER
RADIATING_TOWARDS: BLADDER

## 2018-07-26 ASSESSMENT — PAIN DESCRIPTION - LOCATION
LOCATION: PELVIS

## 2018-07-26 ASSESSMENT — PAIN DESCRIPTION - PAIN TYPE
TYPE: ACUTE PAIN

## 2018-07-26 ASSESSMENT — PAIN DESCRIPTION - ORIENTATION
ORIENTATION: LOWER

## 2018-07-26 ASSESSMENT — PAIN DESCRIPTION - FREQUENCY
FREQUENCY: CONTINUOUS
FREQUENCY: CONTINUOUS
FREQUENCY: INTERMITTENT

## 2018-07-26 ASSESSMENT — PAIN DESCRIPTION - DESCRIPTORS
DESCRIPTORS: ACHING;BURNING;SHARP
DESCRIPTORS: ACHING;BURNING;CONSTANT
DESCRIPTORS: ACHING;BURNING;CONSTANT

## 2018-07-26 NOTE — PROGRESS NOTES
Spoke with Dr Carmina Hidalgo re PVR. Please continue to repeat PVR's would like several scans to trend amount remaining in bladder.

## 2018-07-26 NOTE — PROGRESS NOTES
[address] [place] [address] [facility]  Progress Note    SUBJECTIVE:  Chief Complaint   Patient presents with    Abdominal Pain     started over the weekend, lower abdomen per patient she thinks it is from UTI which is a chronic issue for her    Dysuria     burning upon urination     Patient is tolerating medications. No reported adverse drug reactions. No vomiting, diarrhea. Some nausea. Patient states that she has some burning and discomfort with urination and discomfort in abdomen/suprapubic area. Review of systems:  ROS as above, otherwise negative. Medications:  Scheduled Meds:   sodium chloride flush  10 mL Intravenous 2 times per day    enoxaparin  40 mg Subcutaneous Daily    losartan  100 mg Oral Daily    pantoprazole  40 mg Oral QAM AC    triamterene-hydrochlorothiazide  1 tablet Oral QAM    vitamin D  1,000 Units Oral Daily with breakfast    cefepime  1 g Intravenous Q12H     Continuous Infusions:   sodium chloride 75 mL/hr at 18 0215     PRN Meds:sodium chloride flush, acetaminophen, aluminum & magnesium hydroxide-simethicone, aspirin-acetaminophen-caffeine, sodium chloride, magnesium hydroxide, ondansetron    OBJECTIVE:  /64   Pulse 68   Temp 98.2 °F (36.8 °C) (Oral)   Resp 18   Ht 5' 1\" (1.549 m)   Wt 153 lb 3.2 oz (69.5 kg)   SpO2 99%   BMI 28.95 kg/m²   Temp  Av.2 °F (36.8 °C)  Min: 98.1 °F (36.7 °C)  Max: 98.2 °F (36.8 °C)  Constitutional: The patient is awake, alert, and oriented. Skin: Warm and dry. No rashes were noted. HEENT: Eyes show round, and reactive pupils. No jaundice. Moist mucous membranes, no ulcerations, no thrush. Neck: Supple to movements. Chest: No use of accessory muscles to breathe. Symmetrical expansion. Auscultation reveals no wheezing, crackles, or rhonchi. Cardiovascular: S1 and S2 are rhythmic and regular. No murmurs appreciated. Abdomen: Positive bowel sounds to auscultation. Mild tenderness to palpation. No masses felt. No hepatosplenomegaly. Extremities: No clubbing, no cyanosis, no edema. Lines: peripheral    Laboratory and Tests Review:  Lab Results   Component Value Date    WBC 10.9 07/25/2018    WBC 12.0 (H) 07/24/2018    WBC 9.5 10/09/2017    HGB 12.4 07/25/2018    HCT 36.2 07/25/2018    MCV 81.2 07/25/2018     07/25/2018     Lab Results   Component Value Date    NEUTROABS 7.22 07/25/2018    NEUTROABS 8.39 (H) 07/24/2018    NEUTROABS 9.03 (H) 10/06/2017     Lab Results   Component Value Date    CRP 2.0 (H) 07/25/2018     No results found for: CRPHS  Lab Results   Component Value Date    SEDRATE 21 (H) 07/25/2018     Lab Results   Component Value Date    ALT 19 07/24/2018    AST 32 (H) 07/24/2018    ALKPHOS 124 (H) 07/24/2018    BILITOT 0.7 07/24/2018     Lab Results   Component Value Date     07/25/2018    K 3.6 07/25/2018     07/25/2018    CO2 22 07/25/2018    BUN 17 07/25/2018    CREATININE 0.8 07/25/2018    CREATININE 0.8 07/24/2018    CREATININE 0.8 10/09/2017    GFRAA >60 07/25/2018    LABGLOM >60 07/25/2018    GLUCOSE 131 07/25/2018    PROT 7.2 07/24/2018    LABALBU 3.8 07/24/2018    CALCIUM 8.6 07/25/2018    BILITOT 0.7 07/24/2018    ALKPHOS 124 07/24/2018    AST 32 07/24/2018    ALT 19 07/24/2018     Radiology:  7/24/18 CT abd/pelvis:   1. Mild to moderate diffuse urinary bladder wall thickening and minimal adjacent fatty stranding possibly representing cystitis. Correlation with UA results is recommended. 2. No bowel obstruction or ileus. Status post prior right colectomy. 3. No obstructive uropathy or urinary stone. 4. Status post prior cholecystectomy and hysterectomy. 5. Small to moderate size hiatal hernia. 6. Diffuse hepatic steatosis. 7. 1 cm cystic structure of the pancreatic head could represent a pancreatic cyst versus cystic mass. Follow-up is recommended. 8. Hyperplastic adrenal glands.     Microbiology:   No new cultures  7/24/18 BC pending  7/24/18 Urine pending  7/25/18 Urine >100K GNRs    ASSESSMENT:  · Recurrent, complicated UTI with sepsis secondary to E. coli  · Leukocytosis associated to the above, improving    PLAN:  · Continue Cefepime for now  · Follow up cultures  · Monitor labs    AYAN Polo  8:54 AM  7/26/2018     Patient seen and examined. I had a face to face encounter with the patient. Agree with exam, assessment and plan as outlined above. Addition and corrections were done as deemed appropriate. My exam and plan include: Awaiting final identification and sensitivity of urine culture.     Ardath Litpamela  7/26/2018

## 2018-07-26 NOTE — CONSULTS
catheterization, appendectomy, and  left hip replacement. MEDICATIONS:  Currently are Tylenol, Lovenox, Maalox, Excedrin, Cozaar,  Maxzide, cholecalciferol, milk of magnesia, and Maxipime. SOCIAL HISTORY:  The patient is a former smoker. She lives alone. Her  daughter lives in [place]. Her son lives in [place]. She does not have  family in the area; however, a neighbor apparently looks in on her. FAMILY HISTORY:  Noncontributory. PHYSICAL EXAMINATION:  GENERAL APPEARANCE:  The patient is a very alert female, who is very  oriented, and appears to be comfortable. ABDOMEN:  Soft. There are no palpable organs or masses present. EXTREMITIES:  No edema. IMPRESSION:  Acute urinary tract infection, suspect secondary to incomplete  bladder emptying. Her CAT scan of the abdomen did not show any obstruction  nor did it show any evidence of stones or deformity of her kidneys. Her  serum creatinine is 0.8. She had a renal nuclear scan, which again did not  show obstructive changes. Her urine cultures are pending at this time. PLAN:  The plan is to check her postvoid residuals. I suspect that with  her desire not to have any surgical approach to this problem, that having  her do self intermittent in therapy at the onset of her symptoms, perhaps  with Trimpex may be a way of management of her recurrent infections.         Ora Marques MD    D: 07/26/2018 15:37:10       T: 07/26/2018 15:43:22     [email or web address]  Job#: [id number]     Doc#: [id number]    CC:

## 2018-07-26 NOTE — PLAN OF CARE
Problem: Falls - Risk of:  Goal: Will remain free from falls  Will remain free from falls   Outcome: Met This Shift    Goal: Absence of physical injury  Absence of physical injury   Outcome: Met This Shift      Problem: Pain:  Goal: Control of acute pain  Control of acute pain   Outcome: Met This Shift    Goal: Control of chronic pain  Control of chronic pain   Outcome: Met This Shift      Problem: Risk for Impaired Skin Integrity  Goal: Tissue integrity - skin and mucous membranes  Structural intactness and normal physiological function of skin and  mucous membranes.    Outcome: Met This Shift      Problem: Urinary Elimination:  Goal: Ability to achieve a balanced intake and output will improve  Ability to achieve a balanced intake and output will improve   Outcome: Met This Shift    Goal: Ability to recognize the need to void and respond appropriately will improve  Ability to recognize the need to void and respond appropriately will improve   Outcome: Met This Shift

## 2018-07-27 LAB
ORGANISM: ABNORMAL
ORGANISM: ABNORMAL
URINE CULTURE, ROUTINE: ABNORMAL
URINE CULTURE, ROUTINE: NORMAL

## 2018-07-27 PROCEDURE — 36569 INSJ PICC 5 YR+ W/O IMAGING: CPT

## 2018-07-27 PROCEDURE — 02HV33Z INSERTION OF INFUSION DEVICE INTO SUPERIOR VENA CAVA, PERCUTANEOUS APPROACH: ICD-10-PCS | Performed by: INTERNAL MEDICINE

## 2018-07-27 PROCEDURE — 6370000000 HC RX 637 (ALT 250 FOR IP): Performed by: INTERNAL MEDICINE

## 2018-07-27 PROCEDURE — 97535 SELF CARE MNGMENT TRAINING: CPT

## 2018-07-27 PROCEDURE — 2580000003 HC RX 258: Performed by: REGISTERED NURSE

## 2018-07-27 PROCEDURE — 97530 THERAPEUTIC ACTIVITIES: CPT

## 2018-07-27 PROCEDURE — 6360000002 HC RX W HCPCS: Performed by: INTERNAL MEDICINE

## 2018-07-27 PROCEDURE — 2500000003 HC RX 250 WO HCPCS: Performed by: REGISTERED NURSE

## 2018-07-27 PROCEDURE — 97530 THERAPEUTIC ACTIVITIES: CPT | Performed by: PHYSICAL THERAPIST

## 2018-07-27 PROCEDURE — 1200000000 HC SEMI PRIVATE

## 2018-07-27 PROCEDURE — C1751 CATH, INF, PER/CENT/MIDLINE: HCPCS

## 2018-07-27 PROCEDURE — 2580000003 HC RX 258: Performed by: INTERNAL MEDICINE

## 2018-07-27 PROCEDURE — 76937 US GUIDE VASCULAR ACCESS: CPT

## 2018-07-27 PROCEDURE — 6360000002 HC RX W HCPCS: Performed by: REGISTERED NURSE

## 2018-07-27 RX ORDER — HEPARIN SODIUM (PORCINE) LOCK FLUSH IV SOLN 100 UNIT/ML 100 UNIT/ML
3 SOLUTION INTRAVENOUS PRN
Status: DISCONTINUED | OUTPATIENT
Start: 2018-07-27 | End: 2018-07-29 | Stop reason: HOSPADM

## 2018-07-27 RX ORDER — SODIUM CHLORIDE 0.9 % (FLUSH) 0.9 %
10 SYRINGE (ML) INJECTION PRN
Status: DISCONTINUED | OUTPATIENT
Start: 2018-07-27 | End: 2018-07-29 | Stop reason: HOSPADM

## 2018-07-27 RX ORDER — LIDOCAINE HYDROCHLORIDE 10 MG/ML
5 INJECTION, SOLUTION EPIDURAL; INFILTRATION; INTRACAUDAL; PERINEURAL ONCE
Status: COMPLETED | OUTPATIENT
Start: 2018-07-27 | End: 2018-07-27

## 2018-07-27 RX ORDER — HEPARIN SODIUM (PORCINE) LOCK FLUSH IV SOLN 100 UNIT/ML 100 UNIT/ML
3 SOLUTION INTRAVENOUS EVERY 12 HOURS SCHEDULED
Status: DISCONTINUED | OUTPATIENT
Start: 2018-07-27 | End: 2018-07-29 | Stop reason: HOSPADM

## 2018-07-27 RX ADMIN — TRIAMTERENE AND HYDROCHLOROTHIAZIDE 1 TABLET: 37.5; 25 TABLET ORAL at 10:17

## 2018-07-27 RX ADMIN — ACETAMINOPHEN 650 MG: 325 TABLET ORAL at 22:01

## 2018-07-27 RX ADMIN — VITAMIN D, TAB 1000IU (100/BT) 1000 UNITS: 25 TAB at 10:17

## 2018-07-27 RX ADMIN — ACETAMINOPHEN 650 MG: 325 TABLET ORAL at 16:55

## 2018-07-27 RX ADMIN — LOSARTAN POTASSIUM 50 MG: 50 TABLET, FILM COATED ORAL at 20:19

## 2018-07-27 RX ADMIN — SODIUM CHLORIDE, PRESERVATIVE FREE 300 UNITS: 5 INJECTION INTRAVENOUS at 20:19

## 2018-07-27 RX ADMIN — Medication 10 ML: at 10:17

## 2018-07-27 RX ADMIN — LIDOCAINE HYDROCHLORIDE 2 ML: 10 INJECTION, SOLUTION EPIDURAL; INFILTRATION; INTRACAUDAL; PERINEURAL at 18:14

## 2018-07-27 RX ADMIN — ENOXAPARIN SODIUM 40 MG: 40 INJECTION, SOLUTION INTRAVENOUS; SUBCUTANEOUS at 10:18

## 2018-07-27 RX ADMIN — Medication 10 ML: at 20:19

## 2018-07-27 RX ADMIN — SODIUM CHLORIDE 1000 MG: 900 INJECTION INTRAVENOUS at 13:12

## 2018-07-27 RX ADMIN — ACETAMINOPHEN 650 MG: 325 TABLET ORAL at 02:15

## 2018-07-27 RX ADMIN — SODIUM CHLORIDE: 9 INJECTION, SOLUTION INTRAVENOUS at 18:16

## 2018-07-27 RX ADMIN — PANTOPRAZOLE SODIUM 40 MG: 40 TABLET, DELAYED RELEASE ORAL at 10:17

## 2018-07-27 ASSESSMENT — PAIN DESCRIPTION - LOCATION
LOCATION: ABDOMEN
LOCATION: ABDOMEN
LOCATION: ARM

## 2018-07-27 ASSESSMENT — PAIN DESCRIPTION - ORIENTATION
ORIENTATION: RIGHT;UPPER
ORIENTATION: LOWER;MID
ORIENTATION: LOWER;MID

## 2018-07-27 ASSESSMENT — PAIN DESCRIPTION - PAIN TYPE
TYPE: ACUTE PAIN

## 2018-07-27 ASSESSMENT — PAIN SCALES - GENERAL
PAINLEVEL_OUTOF10: 0
PAINLEVEL_OUTOF10: 3
PAINLEVEL_OUTOF10: 4
PAINLEVEL_OUTOF10: 3
PAINLEVEL_OUTOF10: 3
PAINLEVEL_OUTOF10: 0

## 2018-07-27 ASSESSMENT — PAIN DESCRIPTION - FREQUENCY: FREQUENCY: CONTINUOUS

## 2018-07-27 ASSESSMENT — PAIN DESCRIPTION - DESCRIPTORS
DESCRIPTORS: ACHING
DESCRIPTORS: ACHING;BURNING;CONSTANT

## 2018-07-27 ASSESSMENT — PAIN DESCRIPTION - DIRECTION: RADIATING_TOWARDS: BLADDER

## 2018-07-27 NOTE — PROGRESS NOTES
P Quality Flow/Interdisciplinary Rounds Progress Note        Quality Flow Rounds held on July 27, 2018    Disciplines Attending:  Bedside Nurse, ,  and Nursing Unit [address] Vijay was admitted on 7/24/2018  6:45 AM    Anticipated Discharge Date:  Expected Discharge Date: 07/27/18    Disposition:    Justino Score:  Justino Scale Score: 21    Readmission Risk              Risk of Unplanned Readmission:        9             Discussed patient goal for the day, patient clinical progression, and barriers to discharge.   The following Goal(s) of the Day/Commitment(s) have been identified:  iv abx      Elisabeth Noss  July 27, 2018

## 2018-07-27 NOTE — PROGRESS NOTES
[address] [place] [address] [facility]  Progress Note    SUBJECTIVE:  Chief Complaint   Patient presents with    Abdominal Pain     started over the weekend, lower abdomen per patient she thinks it is from UTI which is a chronic issue for her    Dysuria     burning upon urination     Patient is tolerating medications. No reported adverse drug reactions. No vomiting, diarrhea. Patient states that she has less pain/burning with urination but not completely gone yet. Review of systems:  ROS as above, otherwise negative. Medications:  Scheduled Meds:   sodium chloride flush  10 mL Intravenous 2 times per day    enoxaparin  40 mg Subcutaneous Daily    losartan  100 mg Oral Daily    pantoprazole  40 mg Oral QAM AC    triamterene-hydrochlorothiazide  1 tablet Oral QAM    vitamin D  1,000 Units Oral Daily with breakfast    cefepime  1 g Intravenous Q12H     Continuous Infusions:   sodium chloride 75 mL/hr at 18 0215     PRN Meds:sodium chloride flush, acetaminophen, aluminum & magnesium hydroxide-simethicone, aspirin-acetaminophen-caffeine, sodium chloride, magnesium hydroxide, ondansetron    OBJECTIVE:  BP (!) 141/72   Pulse 77   Temp 97.9 °F (36.6 °C) (Oral)   Resp 16   Ht 5' 1\" (1.549 m)   Wt 154 lb (69.9 kg)   SpO2 96%   BMI 29.10 kg/m²   Temp  Av °F (36.7 °C)  Min: 97.6 °F (36.4 °C)  Max: 98.4 °F (36.9 °C)  Constitutional: The patient is awake, alert, and oriented. Skin: Warm and dry. No rashes were noted. HEENT: Eyes show round, and reactive pupils. No jaundice. Moist mucous membranes, no ulcerations, no thrush. Neck: Supple to movements. Chest: No use of accessory muscles to breathe. Symmetrical expansion. Auscultation reveals no wheezing, crackles, or rhonchi. Cardiovascular: S1 and S2 are rhythmic and regular. No murmurs appreciated. Abdomen: Positive bowel sounds to auscultation. Mild tenderness to palpation. No masses felt.  No

## 2018-07-27 NOTE — PROGRESS NOTES
(MAXZIDE-25) 37.5-25 MG per tablet 1 tablet  1 tablet Oral QAM [facility], DO   1 tablet at 07/27/18 1017    vitamin D (CHOLECALCIFEROL) tablet 1,000 Units  1,000 Units Oral Daily with breakfast [facility], DO   1,000 Units at 07/27/18 1017    magnesium hydroxide (MILK OF MAGNESIA) 400 MG/5ML suspension 30 mL  30 mL Oral Daily PRN [facility], DO        ondansetron [facility] injection 4 mg  4 mg Intravenous Q6H PRN [facility], DO        0.9 % sodium chloride infusion   Intravenous Continuous [facility], DO 75 mL/hr at 07/26/18 0215         Problem list:    Patient Active Problem List   Diagnosis    AVB (atrioventricular block)    Pacemaker    OA (osteoarthritis)    Acute hypoxemic respiratory failure ([facility]    COPD with acute exacerbation (HCC)    Uncontrolled hypertension    Chest pain    Near syncope    Essential hypertension    Atrial tachycardia (HCC)    Orthostatic hypotension    Pacemaker    COPD (chronic obstructive pulmonary disease) (HCC)    Complicated UTI (urinary tract infection)    Cystitis       Assessment:    1. Complicated urinary tract infection-E. Coli, ESBL     2. COPD     3. Essential hypertension     4. History of pacemaker       Plan:    1. Continue antibiotics    2. Urology input appreciated    3. Discharge planning    4.  PICC line      [facility] Keturah ARIAS  3:05 PM  7/27/2018

## 2018-07-27 NOTE — PROGRESS NOTES
Physical Therapy  Facility/Department: [facility]  Daily Treatment Note  NAME: Kanwal Curtis  : 1935  MRN: [id number]    Date of Service: 2018       Patient Diagnosis(es): The primary encounter diagnosis was Acute cystitis without hematuria. Diagnoses of Urinary tract infection without hematuria, site unspecified, Lower abdominal pain, and Pancreatic mass were also pertinent to this visit.      has a past medical history of Arthritis; Asthma; AVB (atrioventricular block); Cancer ([facility] [address] Chest pain; Chronic kidney disease, stage 3; Complicated UTI (urinary tract infection); COPD (chronic obstructive pulmonary disease) ([facility]); COPD (chronic obstructive pulmonary disease) ([facility] [address] GERD (gastroesophageal reflux disease); Hypertension; and Symptomatic bradycardia. has a past surgical history that includes Cholecystectomy; Kyphosis surgery; Echo Complete (2012); pacemaker placement (2012); Hysterectomy; back surgery; Endoscopy, colon, diagnostic; Colonoscopy; Tonsillectomy; Cardiac catheterization (02/10/2016); Appendectomy; and joint replacement.     Evaluating Therapist: Martina [facility] PT         Room #: 160   DIAGNOSIS:  Cystitis      PRECAUTIONS: falls , decreased vision      Social:  Pt lives alone in a  1  floor plan  2  steps and  2  rails to enter. Prior to admission pt walked with  noAD or rollator as needed       Initial Evaluation  Date:  18 Treatment  18    Short Term/ Long Term   Goals   Was pt agreeable to Eval/treatment?   yes  yes     Does pt have pain?  abd/priscila  pain  Urinary discomfort     Bed Mobility  Rolling:  SBA   Supine to sit:  SBA   Sit to supine:  NT   Scooting:  SBA in sit  Rolling: NT  Supine to sit: NT  Sit to supine supervision  Scooting: NT  independent    Transfers Sit to stand:  SBA   Stand to sit:  SBA   Stand pivot:  SBA  Sit to stand: Supervision  Stand to sit: Supervision  Stand Pivot: supervision  independent    Ambulation     15 feet  X 2 with  No AD with  CGA. Amb with ww 100 feet x 1 SBA  400 feet x 1 using [facility] for support Supervision  150  feet with  ww  with  Independent, no LOB          Stair negotiation: ascended and descended NT  3 stairs with 1 rail for support supervision  4  steps with  2 rail with  S/I    LE ROM  WFL        LE strength  4/5        AM- PAC RAW score  18/ 24 18/24        Balance: standing supervision with w/w     Pt performed therapeutic exercise of the following: NT    Patient education  Pt was educated on mobility. Patient response to education:   Pt verbalized understanding Pt demonstrated skill Pt requires further education in this area     x     Additional Comments: pt found sitting in bedside chair agreeable to treatment. Pt instructed in mobility. Pt was left laying in bed with call light left by patient. Time in: 1048  Time out: 1104    Pt is making good progress toward established Physical Therapy goals. Continue with physical therapy current plan of care.     Tamia Cardoza PT, DPT [id number]

## 2018-07-27 NOTE — PLAN OF CARE
Problem: Pain:  Goal: Control of acute pain  Control of acute pain   Outcome: Met This Shift      Problem: Urinary Elimination:  Goal: Ability to achieve a balanced intake and output will improve  Ability to achieve a balanced intake and output will improve   Outcome: Met This Shift    Goal: Ability to recognize the need to void and respond appropriately will improve  Ability to recognize the need to void and respond appropriately will improve   Outcome: Met This Shift

## 2018-07-28 PROCEDURE — 1200000000 HC SEMI PRIVATE

## 2018-07-28 PROCEDURE — 2580000003 HC RX 258: Performed by: INTERNAL MEDICINE

## 2018-07-28 PROCEDURE — 6360000002 HC RX W HCPCS: Performed by: REGISTERED NURSE

## 2018-07-28 PROCEDURE — 2580000003 HC RX 258: Performed by: REGISTERED NURSE

## 2018-07-28 PROCEDURE — 6360000002 HC RX W HCPCS: Performed by: INTERNAL MEDICINE

## 2018-07-28 PROCEDURE — 6370000000 HC RX 637 (ALT 250 FOR IP): Performed by: INTERNAL MEDICINE

## 2018-07-28 RX ADMIN — SODIUM CHLORIDE: 9 INJECTION, SOLUTION INTRAVENOUS at 14:23

## 2018-07-28 RX ADMIN — ACETAMINOPHEN 650 MG: 325 TABLET ORAL at 03:45

## 2018-07-28 RX ADMIN — LOSARTAN POTASSIUM 100 MG: 50 TABLET, FILM COATED ORAL at 20:51

## 2018-07-28 RX ADMIN — TRIAMTERENE AND HYDROCHLOROTHIAZIDE 1 TABLET: 37.5; 25 TABLET ORAL at 08:47

## 2018-07-28 RX ADMIN — ENOXAPARIN SODIUM 40 MG: 40 INJECTION, SOLUTION INTRAVENOUS; SUBCUTANEOUS at 08:47

## 2018-07-28 RX ADMIN — SODIUM CHLORIDE, PRESERVATIVE FREE 300 UNITS: 5 INJECTION INTRAVENOUS at 20:51

## 2018-07-28 RX ADMIN — ACETAMINOPHEN 650 MG: 325 TABLET ORAL at 20:51

## 2018-07-28 RX ADMIN — SODIUM CHLORIDE 1000 MG: 900 INJECTION INTRAVENOUS at 14:22

## 2018-07-28 RX ADMIN — Medication 10 ML: at 20:51

## 2018-07-28 RX ADMIN — PANTOPRAZOLE SODIUM 40 MG: 40 TABLET, DELAYED RELEASE ORAL at 06:08

## 2018-07-28 RX ADMIN — VITAMIN D, TAB 1000IU (100/BT) 1000 UNITS: 25 TAB at 08:47

## 2018-07-28 RX ADMIN — Medication 10 ML: at 08:48

## 2018-07-28 RX ADMIN — SODIUM CHLORIDE, PRESERVATIVE FREE 300 UNITS: 5 INJECTION INTRAVENOUS at 08:47

## 2018-07-28 ASSESSMENT — PAIN DESCRIPTION - FREQUENCY: FREQUENCY: INTERMITTENT

## 2018-07-28 ASSESSMENT — PAIN SCALES - GENERAL
PAINLEVEL_OUTOF10: 0
PAINLEVEL_OUTOF10: 1
PAINLEVEL_OUTOF10: 3
PAINLEVEL_OUTOF10: 3

## 2018-07-28 ASSESSMENT — PAIN DESCRIPTION - LOCATION: LOCATION: ARM

## 2018-07-28 ASSESSMENT — PAIN DESCRIPTION - PROGRESSION: CLINICAL_PROGRESSION: NOT CHANGED

## 2018-07-28 ASSESSMENT — PAIN DESCRIPTION - ORIENTATION: ORIENTATION: RIGHT;UPPER

## 2018-07-28 ASSESSMENT — PAIN DESCRIPTION - PAIN TYPE: TYPE: ACUTE PAIN

## 2018-07-28 ASSESSMENT — PAIN DESCRIPTION - ONSET: ONSET: ON-GOING

## 2018-07-28 ASSESSMENT — PAIN DESCRIPTION - DESCRIPTORS: DESCRIPTORS: ACHING;DISCOMFORT

## 2018-07-28 NOTE — PLAN OF CARE
Problem: Falls - Risk of:  Goal: Will remain free from falls  Will remain free from falls   Outcome: Met This Shift      Problem: Pain  Goal: Pain level will decrease  Pain level will decrease      Outcome: Met This Shift      Problem: Pain:  Goal: Pain level will decrease  Pain level will decrease      Outcome: Met This Shift      Problem: Risk for Impaired Skin Integrity  Goal: Tissue integrity - skin and mucous membranes  Structural intactness and normal physiological function of skin and  mucous membranes.    Outcome: Met This Shift

## 2018-07-28 NOTE — PROGRESS NOTES
auscultation. Minimal tenderness suprapubic area to palpation. No masses felt. No hepatosplenomegaly. Extremities: No clubbing, no cyanosis, no edema. Lines: PICC RUE 7/27/18    Laboratory and Tests Review:  Lab Results   Component Value Date    WBC 10.9 07/25/2018    WBC 12.0 (H) 07/24/2018    WBC 9.5 10/09/2017    HGB 12.4 07/25/2018    HCT 36.2 07/25/2018    MCV 81.2 07/25/2018     07/25/2018     Lab Results   Component Value Date    NEUTROABS 7.22 07/25/2018    NEUTROABS 8.39 (H) 07/24/2018    NEUTROABS 9.03 (H) 10/06/2017     Lab Results   Component Value Date    CRP 2.0 (H) 07/25/2018     No results found for: CRP  Lab Results   Component Value Date    SEDRATE 21 (H) 07/25/2018     Lab Results   Component Value Date    ALT 19 07/24/2018    AST 32 (H) 07/24/2018    ALKPHOS 124 (H) 07/24/2018    BILITOT 0.7 07/24/2018     Lab Results   Component Value Date     07/25/2018    K 3.6 07/25/2018     07/25/2018    CO2 22 07/25/2018    BUN 17 07/25/2018    CREATININE 0.8 07/25/2018    CREATININE 0.8 07/24/2018    CREATININE 0.8 10/09/2017    GFRAA >60 07/25/2018    LABGLOM >60 07/25/2018    GLUCOSE 131 07/25/2018    PROT 7.2 07/24/2018    LABALBU 3.8 07/24/2018    CALCIUM 8.6 07/25/2018    BILITOT 0.7 07/24/2018    ALKPHOS 124 07/24/2018    AST 32 07/24/2018    ALT 19 07/24/2018     Radiology:  7/24/18 CT abd/pelvis:   1. Mild to moderate diffuse urinary bladder wall thickening and minimal adjacent fatty stranding possibly representing cystitis. Correlation with UA results is recommended. 2. No bowel obstruction or ileus. Status post prior right colectomy. 3. No obstructive uropathy or urinary stone. 4. Status post prior cholecystectomy and hysterectomy. 5. Small to moderate size hiatal hernia. 6. Diffuse hepatic steatosis. 7. 1 cm cystic structure of the pancreatic head could represent a pancreatic cyst versus cystic mass. Follow-up is recommended.   8. Hyperplastic adrenal glands. Microbiology:   No new cultures  7/24/18 BC negative so far  7/24/18 Urine >100K ESBL+ E. Coli, <25K Cb  7/25/18 Urine <10K GNRs    ASSESSMENT:  · Recurrent, complicated UTI with probable sepsis secondary to ESBL+ E. coli. Unable to determine any other further specifics about sepsis  · Leukocytosis associated to the above, improving  · Urology following, checking PVRs    PLAN:  · Stopped Cefepime   · Continue Ertapenem 7 - 10 more days  · Contact isolation  · [place] [id number]  · [id number] [address] for discharge from [facility]  10:58 AM  7/28/2018     Patient seen and examined. I had a face to face encounter with the patient. Agree with exam, assessment and plan as outlined above. Addition and corrections were done as deemed appropriate. My exam and plan include: Patient can be discharged home once IV antibiotics set up.     Rigoberto Riley  7/28/2018

## 2018-07-29 VITALS
WEIGHT: 156 LBS | DIASTOLIC BLOOD PRESSURE: 62 MMHG | HEIGHT: 61 IN | RESPIRATION RATE: 16 BRPM | OXYGEN SATURATION: 98 % | BODY MASS INDEX: 29.45 KG/M2 | TEMPERATURE: 97.3 F | SYSTOLIC BLOOD PRESSURE: 140 MMHG | HEART RATE: 80 BPM

## 2018-07-29 LAB
ANION GAP SERPL CALCULATED.3IONS-SCNC: 12 MMOL/L (ref 7–16)
BLOOD CULTURE, ROUTINE: NORMAL
BUN BLDV-MCNC: 12 MG/DL (ref 8–23)
CALCIUM SERPL-MCNC: 9.1 MG/DL (ref 8.6–10.2)
CHLORIDE BLD-SCNC: 107 MMOL/L (ref 98–107)
CO2: 22 MMOL/L (ref 22–29)
CREAT SERPL-MCNC: 0.7 MG/DL (ref 0.5–1)
GFR AFRICAN AMERICAN: >60
GFR NON-AFRICAN AMERICAN: >60 ML/MIN/1.73
GLUCOSE BLD-MCNC: 106 MG/DL (ref 74–109)
HCT VFR BLD CALC: 36.1 % (ref 34–48)
HEMOGLOBIN: 12.2 G/DL (ref 11.5–15.5)
MCH RBC QN AUTO: 27.6 PG (ref 26–35)
MCHC RBC AUTO-ENTMCNC: 33.8 % (ref 32–34.5)
MCV RBC AUTO: 81.7 FL (ref 80–99.9)
PDW BLD-RTO: 14.5 FL (ref 11.5–15)
PLATELET # BLD: 231 E9/L (ref 130–450)
PMV BLD AUTO: 10.3 FL (ref 7–12)
POTASSIUM SERPL-SCNC: 3.7 MMOL/L (ref 3.5–5)
RBC # BLD: 4.42 E12/L (ref 3.5–5.5)
SODIUM BLD-SCNC: 141 MMOL/L (ref 132–146)
WBC # BLD: 7.7 E9/L (ref 4.5–11.5)

## 2018-07-29 PROCEDURE — 6370000000 HC RX 637 (ALT 250 FOR IP): Performed by: INTERNAL MEDICINE

## 2018-07-29 PROCEDURE — 85027 COMPLETE CBC AUTOMATED: CPT

## 2018-07-29 PROCEDURE — 2580000003 HC RX 258: Performed by: INTERNAL MEDICINE

## 2018-07-29 PROCEDURE — 2580000003 HC RX 258: Performed by: REGISTERED NURSE

## 2018-07-29 PROCEDURE — 36415 COLL VENOUS BLD VENIPUNCTURE: CPT

## 2018-07-29 PROCEDURE — 80048 BASIC METABOLIC PNL TOTAL CA: CPT

## 2018-07-29 PROCEDURE — 6360000002 HC RX W HCPCS: Performed by: INTERNAL MEDICINE

## 2018-07-29 PROCEDURE — 6360000002 HC RX W HCPCS: Performed by: REGISTERED NURSE

## 2018-07-29 RX ORDER — LOSARTAN POTASSIUM 100 MG/1
100 TABLET ORAL DAILY
Qty: 30 TABLET | Refills: 3 | Status: ON HOLD | OUTPATIENT
Start: 2018-07-29 | End: 2018-11-27 | Stop reason: HOSPADM

## 2018-07-29 RX ADMIN — SODIUM CHLORIDE 1000 MG: 900 INJECTION INTRAVENOUS at 14:32

## 2018-07-29 RX ADMIN — ACETAMINOPHEN 650 MG: 325 TABLET ORAL at 03:11

## 2018-07-29 RX ADMIN — ACETAMINOPHEN 650 MG: 325 TABLET ORAL at 13:09

## 2018-07-29 RX ADMIN — SODIUM CHLORIDE, PRESERVATIVE FREE 300 UNITS: 5 INJECTION INTRAVENOUS at 08:40

## 2018-07-29 RX ADMIN — TRIAMTERENE AND HYDROCHLOROTHIAZIDE 1 TABLET: 37.5; 25 TABLET ORAL at 08:39

## 2018-07-29 RX ADMIN — Medication 10 ML: at 08:39

## 2018-07-29 RX ADMIN — PANTOPRAZOLE SODIUM 40 MG: 40 TABLET, DELAYED RELEASE ORAL at 06:12

## 2018-07-29 RX ADMIN — ENOXAPARIN SODIUM 40 MG: 40 INJECTION, SOLUTION INTRAVENOUS; SUBCUTANEOUS at 08:39

## 2018-07-29 RX ADMIN — VITAMIN D, TAB 1000IU (100/BT) 1000 UNITS: 25 TAB at 08:39

## 2018-07-29 ASSESSMENT — PAIN DESCRIPTION - ORIENTATION: ORIENTATION: LOWER

## 2018-07-29 ASSESSMENT — PAIN DESCRIPTION - PAIN TYPE: TYPE: ACUTE PAIN

## 2018-07-29 ASSESSMENT — PAIN DESCRIPTION - FREQUENCY: FREQUENCY: INTERMITTENT

## 2018-07-29 ASSESSMENT — PAIN DESCRIPTION - DESCRIPTORS: DESCRIPTORS: ACHING;DISCOMFORT

## 2018-07-29 ASSESSMENT — PAIN SCALES - GENERAL
PAINLEVEL_OUTOF10: 4
PAINLEVEL_OUTOF10: 2
PAINLEVEL_OUTOF10: 3
PAINLEVEL_OUTOF10: 0

## 2018-07-29 ASSESSMENT — PAIN DESCRIPTION - ONSET: ONSET: ON-GOING

## 2018-07-29 ASSESSMENT — PAIN DESCRIPTION - LOCATION: LOCATION: BACK;NECK

## 2018-07-29 ASSESSMENT — PAIN DESCRIPTION - PROGRESSION: CLINICAL_PROGRESSION: NOT CHANGED

## 2018-07-29 NOTE — PROGRESS NOTES
hepatosplenomegaly. Extremities: No clubbing, no cyanosis, mild edema. Lines: PICC RUE 7/27/18    Laboratory and Tests Review:  Lab Results   Component Value Date    WBC 7.7 07/29/2018    WBC 10.9 07/25/2018    WBC 12.0 (H) 07/24/2018    HGB 12.2 07/29/2018    HCT 36.1 07/29/2018    MCV 81.7 07/29/2018     07/29/2018     Lab Results   Component Value Date    NEUTROABS 7.22 07/25/2018    NEUTROABS 8.39 (H) 07/24/2018    NEUTROABS 9.03 (H) 10/06/2017     Lab Results   Component Value Date    CRP 2.0 (H) 07/25/2018     No results found for: CRP  Lab Results   Component Value Date    SEDRATE 21 (H) 07/25/2018     Lab Results   Component Value Date    ALT 19 07/24/2018    AST 32 (H) 07/24/2018    ALKPHOS 124 (H) 07/24/2018    BILITOT 0.7 07/24/2018     Lab Results   Component Value Date     07/29/2018    K 3.7 07/29/2018    K 3.6 07/25/2018     07/29/2018    CO2 22 07/29/2018    BUN 12 07/29/2018    CREATININE 0.7 07/29/2018    CREATININE 0.8 07/25/2018    CREATININE 0.8 07/24/2018    GFRAA >60 07/29/2018    LABGLOM >60 07/29/2018    GLUCOSE 106 07/29/2018    PROT 7.2 07/24/2018    LABALBU 3.8 07/24/2018    CALCIUM 9.1 07/29/2018    BILITOT 0.7 07/24/2018    ALKPHOS 124 07/24/2018    AST 32 07/24/2018    ALT 19 07/24/2018     Radiology:  7/24/18 CT abd/pelvis:   1. Mild to moderate diffuse urinary bladder wall thickening and minimal adjacent fatty stranding possibly representing cystitis. Correlation with UA results is recommended. 2. No bowel obstruction or ileus. Status post prior right colectomy. 3. No obstructive uropathy or urinary stone. 4. Status post prior cholecystectomy and hysterectomy. 5. Small to moderate size hiatal hernia. 6. Diffuse hepatic steatosis. 7. 1 cm cystic structure of the pancreatic head could represent a pancreatic cyst versus cystic mass. Follow-up is recommended. 8. Hyperplastic adrenal glands.     Microbiology:   No new cultures  7/24/18 BC negative so far  7/24/18 Urine >100K ESBL+ E. Coli, <25K Cb  7/25/18 Urine <10K GNRs    ASSESSMENT:  · Recurrent, complicated UTI with probable sepsis secondary to ESBL+ E. coli. Unable to determine any other further specifics about sepsis  · Leukocytosis associated to the above, improving  · Urology following, checking PVRs  · Diarrhea, probable AAD    PLAN:  · Stopped Cefepime   · Continue Ertapenem 7 - 10 more days  · Contact isolation  · Check stool for C diff  · [place] to home [place] [id number]  · [facility] from [facility] AYAN Seymour  11:14 AM  7/29/2018     Patient seen and examined. I had a face to face encounter with the patient. Agree with exam, assessment and plan as outlined above. Addition and corrections were done as deemed appropriate. My exam and plan include: Send stools for C. difficile.     Tray Andino  7/29/2018

## 2018-07-30 NOTE — DISCHARGE SUMMARY
tolerated  Diet: regular diet    Follow-up with Dr Heike Mayen in 1 week. Note that over 30 minutes was spent in preparing discharge papers, discussing discharge with patient, medication review, etc.    Signed:  VIBHA Simon  7/30/2018  5:33 PM

## 2018-09-04 ENCOUNTER — NURSE ONLY (OUTPATIENT)
Dept: NON INVASIVE DIAGNOSTICS | Age: 83
End: 2018-09-04
Payer: MEDICARE

## 2018-09-04 DIAGNOSIS — Z95.0 PACEMAKER: Primary | Chronic | ICD-10-CM

## 2018-09-04 PROCEDURE — 93294 REM INTERROG EVL PM/LDLS PM: CPT | Performed by: INTERNAL MEDICINE

## 2018-09-04 PROCEDURE — 93296 REM INTERROG EVL PM/IDS: CPT | Performed by: INTERNAL MEDICINE

## 2018-09-19 NOTE — PROGRESS NOTES
See [facility] [place] report. Remote monitoring reviewed over a 90 day period.   End of 90 day monitoring period date of service 09/03/18

## 2018-11-19 ENCOUNTER — HOSPITAL ENCOUNTER (EMERGENCY)
Age: 83
Discharge: HOME | End: 2018-11-19
Attending: EMERGENCY MEDICINE
Payer: MEDICARE

## 2018-11-19 VITALS
SYSTOLIC BLOOD PRESSURE: 126 MMHG | TEMPERATURE: 98.6 F | OXYGEN SATURATION: 97 % | HEART RATE: 106 BPM | WEIGHT: 147 LBS | DIASTOLIC BLOOD PRESSURE: 82 MMHG | BODY MASS INDEX: 27.05 KG/M2 | HEIGHT: 62 IN | RESPIRATION RATE: 16 BRPM

## 2018-11-19 DIAGNOSIS — N30.00 ACUTE CYSTITIS WITHOUT HEMATURIA: Primary | ICD-10-CM

## 2018-11-19 DIAGNOSIS — K64.8 INTERNAL HEMORRHOIDS: ICD-10-CM

## 2018-11-19 LAB
BACTERIA: ABNORMAL /HPF
BILIRUBIN URINE: NEGATIVE
BLOOD, URINE: ABNORMAL
CLARITY: ABNORMAL
COLOR: YELLOW
EPITHELIAL CELLS, UA: ABNORMAL /HPF
GLUCOSE URINE: NEGATIVE MG/DL
KETONES, URINE: NEGATIVE MG/DL
LEUKOCYTE ESTERASE, URINE: ABNORMAL
NITRITE, URINE: POSITIVE
PH UA: 6 (ref 5–9)
PROTEIN UA: ABNORMAL MG/DL
RBC UA: ABNORMAL /HPF (ref 0–2)
SPECIFIC GRAVITY UA: 1.01 (ref 1–1.03)
UROBILINOGEN, URINE: 0.2 E.U./DL
WBC UA: ABNORMAL /HPF (ref 0–5)

## 2018-11-19 PROCEDURE — 6370000000 HC RX 637 (ALT 250 FOR IP): Performed by: EMERGENCY MEDICINE

## 2018-11-19 PROCEDURE — 99284 EMERGENCY DEPT VISIT MOD MDM: CPT

## 2018-11-19 PROCEDURE — 6360000002 HC RX W HCPCS: Performed by: EMERGENCY MEDICINE

## 2018-11-19 PROCEDURE — 81001 URINALYSIS AUTO W/SCOPE: CPT

## 2018-11-19 RX ORDER — DIPHENOXYLATE HYDROCHLORIDE AND ATROPINE SULFATE 2.5; .025 MG/1; MG/1
1 TABLET ORAL ONCE
Status: COMPLETED | OUTPATIENT
Start: 2018-11-19 | End: 2018-11-19

## 2018-11-19 RX ORDER — ONDANSETRON 4 MG/1
4 TABLET, ORALLY DISINTEGRATING ORAL ONCE
Status: COMPLETED | OUTPATIENT
Start: 2018-11-19 | End: 2018-11-19

## 2018-11-19 RX ORDER — CEFDINIR 300 MG/1
300 CAPSULE ORAL 2 TIMES DAILY
Qty: 10 CAPSULE | Refills: 0 | Status: ON HOLD | OUTPATIENT
Start: 2018-11-19 | End: 2018-11-24 | Stop reason: ALTCHOICE

## 2018-11-19 RX ADMIN — DIPHENOXYLATE HYDROCHLORIDE AND ATROPINE SULFATE 1 TABLET: 2.5; .025 TABLET ORAL at 15:24

## 2018-11-19 RX ADMIN — ONDANSETRON 4 MG: 4 TABLET, ORALLY DISINTEGRATING ORAL at 15:24

## 2018-11-19 NOTE — ED PROVIDER NOTES
reported. Neurologic: No weakness numbness or gait abnormalities. Psychiatric; No hallucination, delusion homicidal or suicidal thoughts. Physical Exam:    General:  No acute distress noted. Patient is well nourished and well developed. Vital signs and nurses assesement reviewed. Head: Normocephalic, atraumatic, oral exam showed no abnormalities. Eyes: PERRLA, EOMI, No scleral icterus or papliedema. Neck: No thyroid masses, carotid bruits, tracheal deviation or significant adenopathy. Lungs: Clear to auscultation bilaterally. No rales, rhonchi or pleural rubs. Heart: Normal PMI, No gallop, murmurs, rubs or abnormal heart sounds. Abdomen: Soft without tenderness rigidity, rebound tenderness, organ enlargement or masses. Normal bowel sounds activity. Extremities: No dependent edema, cyanosis, calf tenderness or deformities. Zulema's sign negative bilaterally. Peripheral pulses were normal and symmetrical.    Neurologic: Awake and alert, with normal speech and comprehension. Cranial nerve grossly intact, normal muscle power, tone and deep tendon reflexes. No sensory loss. Normal gait and co-ordination. Skin: No rashes, ulcers, cyanosis or pallor. Capillary refill normal.          --------------------------------------------- PAST HISTORY ---------------------------------------------      Past Medical History:  has a past medical history of Arthritis; Asthma; AVB (atrioventricular block); Cancer ([facility] [address] Chest pain; Chronic kidney disease, stage 3 ([facility] [address] Complicated UTI (urinary tract infection); COPD (chronic obstructive pulmonary disease) ([facility]); [facility] GERD (gastroesophageal reflux disease); Hypertension; and Symptomatic bradycardia. Past Surgical History:  has a past surgical history that includes Cholecystectomy; Kyphosis surgery; Echo Complete (6/20/2012); pacemaker placement (6-); Hysterectomy; back surgery;  Endoscopy, colon, diagnostic; Colonoscopy; Tonsillectomy; Cardiac catheterization (02/10/2016); Appendectomy; and joint replacement. Social History:  reports that she quit smoking about 36 years ago. Her smoking use included Cigarettes. She started smoking about 53 years ago. She has a 17.00 pack-year smoking history. She has never used smokeless tobacco. She reports that she does not drink alcohol or use drugs. Family History: family history includes Heart Disease in her mother; Other in her father. The patients home medications have been reviewed. Allergies: Amlodipine; Augmentin [amoxicillin-pot clavulanate]; Bactrim; Benadryl [diphenhydramine]; Brovana [arformoterol]; Cardizem [diltiazem hcl]; Codeine; Doxazosin; Ipratropium; Macrodantin [nitrofurantoin macrocrystal]; and Verapamil    -------------------------------------------------- RESULTS -------------------------------------------------      Results for orders placed or performed during the hospital encounter of 11/19/18   Urinalysis   Result Value Ref Range    Color, UA Yellow Straw/Yellow    Clarity, UA TURBID (A) Clear    Glucose, Ur Negative Negative mg/dL    Bilirubin Urine Negative Negative    Ketones, Urine Negative Negative mg/dL    Specific Gravity, UA 1.015 1.005 - 1.030    Blood, Urine SMALL (A) Negative    pH, UA 6.0 5.0 - 9.0    Protein, UA TRACE Negative mg/dL    Urobilinogen, Urine 0.2 <2.0 E.U./dL    Nitrite, Urine POSITIVE (A) Negative    Leukocyte Esterase, Urine LARGE (A) Negative   Microscopic Urinalysis   Result Value Ref Range    WBC, UA PACKED 0 - 5 /HPF    RBC, UA 5-10 (A) 0 - 2 /HPF    Epi Cells FEW /HPF    Bacteria, UA MANY (A) /HPF     No orders to display           All above test results were reviewed in details. ------------------------- NURSING NOTES AND VITALS REVIEWED ---------------------------   The nursing notes within the ED encounter and vital signs as below have been reviewed.    /82   Pulse 106   Temp 98.6 °F

## 2018-11-24 ENCOUNTER — HOSPITAL ENCOUNTER (OUTPATIENT)
Age: 83
Discharge: HOME | End: 2018-11-24
Payer: MEDICARE

## 2018-11-24 ENCOUNTER — APPOINTMENT (OUTPATIENT)
Dept: GENERAL RADIOLOGY | Age: 83
DRG: 690 | End: 2018-11-24
Payer: MEDICARE

## 2018-11-24 ENCOUNTER — HOSPITAL ENCOUNTER (INPATIENT)
Age: 83
LOS: 3 days | Discharge: HOME HEALTH | DRG: 690 | End: 2018-11-27
Attending: EMERGENCY MEDICINE | Admitting: INTERNAL MEDICINE
Payer: MEDICARE

## 2018-11-24 DIAGNOSIS — N39.0 COMPLICATED UTI (URINARY TRACT INFECTION): Primary | ICD-10-CM

## 2018-11-24 PROBLEM — B96.29 UTI DUE TO EXTENDED-SPECTRUM BETA LACTAMASE (ESBL) PRODUCING ESCHERICHIA COLI: Status: ACTIVE | Noted: 2018-11-24

## 2018-11-24 PROBLEM — Z16.12 UTI DUE TO EXTENDED-SPECTRUM BETA LACTAMASE (ESBL) PRODUCING ESCHERICHIA COLI: Status: ACTIVE | Noted: 2018-11-24

## 2018-11-24 LAB
ALBUMIN SERPL-MCNC: 4 G/DL (ref 3.5–5.2)
ALP BLD-CCNC: 166 U/L (ref 35–104)
ALT SERPL-CCNC: 38 U/L (ref 0–32)
ANION GAP SERPL CALCULATED.3IONS-SCNC: 14 MMOL/L (ref 7–16)
AST SERPL-CCNC: 37 U/L (ref 0–31)
BACTERIA: ABNORMAL /HPF
BASOPHILS ABSOLUTE: 0.07 E9/L (ref 0–0.2)
BASOPHILS RELATIVE PERCENT: 0.5 % (ref 0–2)
BILIRUB SERPL-MCNC: 0.3 MG/DL (ref 0–1.2)
BILIRUBIN URINE: NEGATIVE
BLOOD, URINE: ABNORMAL
BUN BLDV-MCNC: 25 MG/DL (ref 8–23)
CALCIUM SERPL-MCNC: 9.4 MG/DL (ref 8.6–10.2)
CHLORIDE BLD-SCNC: 100 MMOL/L (ref 98–107)
CLARITY: ABNORMAL
CO2: 24 MMOL/L (ref 22–29)
COLOR: ABNORMAL
CREAT SERPL-MCNC: 1.2 MG/DL (ref 0.5–1)
EOSINOPHILS ABSOLUTE: 0.33 E9/L (ref 0.05–0.5)
EOSINOPHILS RELATIVE PERCENT: 2.3 % (ref 0–6)
EPITHELIAL CELLS, UA: ABNORMAL /HPF
GFR AFRICAN AMERICAN: 52
GFR NON-AFRICAN AMERICAN: 43 ML/MIN/1.73
GLUCOSE BLD-MCNC: 148 MG/DL (ref 74–99)
GLUCOSE URINE: NEGATIVE MG/DL
HCT VFR BLD CALC: 42.5 % (ref 34–48)
HEMOGLOBIN: 13.7 G/DL (ref 11.5–15.5)
IMMATURE GRANULOCYTES #: 0.08 E9/L
IMMATURE GRANULOCYTES %: 0.6 % (ref 0–5)
KETONES, URINE: NEGATIVE MG/DL
LEUKOCYTE ESTERASE, URINE: ABNORMAL
LYMPHOCYTES ABSOLUTE: 2.02 E9/L (ref 1.5–4)
LYMPHOCYTES RELATIVE PERCENT: 14 % (ref 20–42)
MCH RBC QN AUTO: 26.8 PG (ref 26–35)
MCHC RBC AUTO-ENTMCNC: 32.2 % (ref 32–34.5)
MCV RBC AUTO: 83.2 FL (ref 80–99.9)
MONOCYTES ABSOLUTE: 1.35 E9/L (ref 0.1–0.95)
MONOCYTES RELATIVE PERCENT: 9.3 % (ref 2–12)
NEUTROPHILS ABSOLUTE: 10.63 E9/L (ref 1.8–7.3)
NEUTROPHILS RELATIVE PERCENT: 73.3 % (ref 43–80)
NITRITE, URINE: POSITIVE
PDW BLD-RTO: 13.3 FL (ref 11.5–15)
PH UA: 5.5 (ref 5–9)
PLATELET # BLD: 319 E9/L (ref 130–450)
PMV BLD AUTO: 10.1 FL (ref 7–12)
POTASSIUM SERPL-SCNC: 3.3 MMOL/L (ref 3.5–5)
PROTEIN UA: 30 MG/DL
RBC # BLD: 5.11 E12/L (ref 3.5–5.5)
RBC UA: >20 /HPF (ref 0–2)
SODIUM BLD-SCNC: 138 MMOL/L (ref 132–146)
SPECIFIC GRAVITY UA: 1.01 (ref 1–1.03)
TOTAL PROTEIN: 7.6 G/DL (ref 6.4–8.3)
TROPONIN: <0.01 NG/ML (ref 0–0.03)
UROBILINOGEN, URINE: 0.2 E.U./DL
WBC # BLD: 14.5 E9/L (ref 4.5–11.5)
WBC UA: ABNORMAL /HPF (ref 0–5)

## 2018-11-24 PROCEDURE — 87040 BLOOD CULTURE FOR BACTERIA: CPT

## 2018-11-24 PROCEDURE — 2580000003 HC RX 258: Performed by: SPECIALIST

## 2018-11-24 PROCEDURE — 87088 URINE BACTERIA CULTURE: CPT

## 2018-11-24 PROCEDURE — 84484 ASSAY OF TROPONIN QUANT: CPT

## 2018-11-24 PROCEDURE — 85025 COMPLETE CBC W/AUTO DIFF WBC: CPT

## 2018-11-24 PROCEDURE — A0426 ALS 1: HCPCS

## 2018-11-24 PROCEDURE — 81001 URINALYSIS AUTO W/SCOPE: CPT

## 2018-11-24 PROCEDURE — 2580000003 HC RX 258: Performed by: EMERGENCY MEDICINE

## 2018-11-24 PROCEDURE — 6370000000 HC RX 637 (ALT 250 FOR IP): Performed by: INTERNAL MEDICINE

## 2018-11-24 PROCEDURE — 99285 EMERGENCY DEPT VISIT HI MDM: CPT

## 2018-11-24 PROCEDURE — 87186 SC STD MICRODIL/AGAR DIL: CPT

## 2018-11-24 PROCEDURE — 36415 COLL VENOUS BLD VENIPUNCTURE: CPT

## 2018-11-24 PROCEDURE — 2140000000 HC CCU INTERMEDIATE R&B

## 2018-11-24 PROCEDURE — 71046 X-RAY EXAM CHEST 2 VIEWS: CPT

## 2018-11-24 PROCEDURE — 6360000002 HC RX W HCPCS: Performed by: SPECIALIST

## 2018-11-24 PROCEDURE — 96375 TX/PRO/DX INJ NEW DRUG ADDON: CPT

## 2018-11-24 PROCEDURE — 80053 COMPREHEN METABOLIC PANEL: CPT

## 2018-11-24 PROCEDURE — 2580000003 HC RX 258: Performed by: INTERNAL MEDICINE

## 2018-11-24 PROCEDURE — 6360000002 HC RX W HCPCS: Performed by: EMERGENCY MEDICINE

## 2018-11-24 PROCEDURE — 96365 THER/PROPH/DIAG IV INF INIT: CPT

## 2018-11-24 PROCEDURE — 6360000002 HC RX W HCPCS: Performed by: INTERNAL MEDICINE

## 2018-11-24 PROCEDURE — A0425 GROUND MILEAGE: HCPCS

## 2018-11-24 RX ORDER — LEVOFLOXACIN 5 MG/ML
500 INJECTION, SOLUTION INTRAVENOUS ONCE
Status: COMPLETED | OUTPATIENT
Start: 2018-11-24 | End: 2018-11-24

## 2018-11-24 RX ORDER — LOSARTAN POTASSIUM 50 MG/1
100 TABLET ORAL DAILY
Status: DISCONTINUED | OUTPATIENT
Start: 2018-11-24 | End: 2018-11-25

## 2018-11-24 RX ORDER — TRAMADOL HYDROCHLORIDE 50 MG/1
50 TABLET ORAL EVERY 6 HOURS PRN
Status: DISCONTINUED | OUTPATIENT
Start: 2018-11-24 | End: 2018-11-27 | Stop reason: HOSPADM

## 2018-11-24 RX ORDER — PROMETHAZINE HYDROCHLORIDE 25 MG/1
6.25 TABLET ORAL NIGHTLY
Status: DISCONTINUED | OUTPATIENT
Start: 2018-11-24 | End: 2018-11-27 | Stop reason: HOSPADM

## 2018-11-24 RX ORDER — ACETAMINOPHEN 325 MG/1
650 TABLET ORAL EVERY 6 HOURS PRN
Status: DISCONTINUED | OUTPATIENT
Start: 2018-11-24 | End: 2018-11-27 | Stop reason: HOSPADM

## 2018-11-24 RX ORDER — 0.9 % SODIUM CHLORIDE 0.9 %
500 INTRAVENOUS SOLUTION INTRAVENOUS ONCE
Status: COMPLETED | OUTPATIENT
Start: 2018-11-24 | End: 2018-11-24

## 2018-11-24 RX ORDER — PROMETHAZINE HYDROCHLORIDE 25 MG/1
6.25 TABLET ORAL NIGHTLY
Status: ON HOLD | COMMUNITY
End: 2019-01-29 | Stop reason: HOSPADM

## 2018-11-24 RX ORDER — ONDANSETRON 2 MG/ML
4 INJECTION INTRAMUSCULAR; INTRAVENOUS ONCE
Status: COMPLETED | OUTPATIENT
Start: 2018-11-24 | End: 2018-11-24

## 2018-11-24 RX ORDER — TRIAMTERENE AND HYDROCHLOROTHIAZIDE 37.5; 25 MG/1; MG/1
1 TABLET ORAL EVERY MORNING
Status: DISCONTINUED | OUTPATIENT
Start: 2018-11-25 | End: 2018-11-27 | Stop reason: HOSPADM

## 2018-11-24 RX ORDER — PROMETHAZINE HYDROCHLORIDE 25 MG/ML
12.5 INJECTION, SOLUTION INTRAMUSCULAR; INTRAVENOUS ONCE
Status: COMPLETED | OUTPATIENT
Start: 2018-11-24 | End: 2018-11-24

## 2018-11-24 RX ORDER — SODIUM CHLORIDE 9 MG/ML
INJECTION, SOLUTION INTRAVENOUS CONTINUOUS
Status: DISCONTINUED | OUTPATIENT
Start: 2018-11-24 | End: 2018-11-27 | Stop reason: HOSPADM

## 2018-11-24 RX ADMIN — LOSARTAN POTASSIUM 100 MG: 50 TABLET, FILM COATED ORAL at 21:51

## 2018-11-24 RX ADMIN — ERTAPENEM SODIUM 1000 MG: 1 INJECTION, POWDER, LYOPHILIZED, FOR SOLUTION INTRAMUSCULAR; INTRAVENOUS at 21:51

## 2018-11-24 RX ADMIN — PROMETHAZINE HYDROCHLORIDE 12.5 MG: 25 INJECTION INTRAMUSCULAR; INTRAVENOUS at 15:11

## 2018-11-24 RX ADMIN — LEVOFLOXACIN 500 MG: 5 INJECTION, SOLUTION INTRAVENOUS at 14:54

## 2018-11-24 RX ADMIN — PROMETHAZINE HYDROCHLORIDE 6.25 MG: 25 TABLET ORAL at 21:52

## 2018-11-24 RX ADMIN — ONDANSETRON 4 MG: 2 INJECTION INTRAMUSCULAR; INTRAVENOUS at 14:20

## 2018-11-24 RX ADMIN — ENOXAPARIN SODIUM 30 MG: 30 INJECTION, SOLUTION INTRAVENOUS; SUBCUTANEOUS at 18:47

## 2018-11-24 RX ADMIN — TRAMADOL HYDROCHLORIDE 50 MG: 50 TABLET, FILM COATED ORAL at 21:51

## 2018-11-24 RX ADMIN — SODIUM CHLORIDE: 9 INJECTION, SOLUTION INTRAVENOUS at 18:47

## 2018-11-24 RX ADMIN — SODIUM CHLORIDE 500 ML: 9 INJECTION, SOLUTION INTRAVENOUS at 14:19

## 2018-11-24 RX ADMIN — ACETAMINOPHEN 650 MG: 325 TABLET, FILM COATED ORAL at 18:47

## 2018-11-24 ASSESSMENT — PAIN DESCRIPTION - PAIN TYPE
TYPE: ACUTE PAIN

## 2018-11-24 ASSESSMENT — PAIN DESCRIPTION - DESCRIPTORS
DESCRIPTORS: DISCOMFORT;CRAMPING;CONSTANT
DESCRIPTORS: CRAMPING;CRUSHING;DISCOMFORT
DESCRIPTORS: DISCOMFORT;CRUSHING;CRAMPING
DESCRIPTORS: PRESSURE;ACHING

## 2018-11-24 ASSESSMENT — PAIN DESCRIPTION - FREQUENCY
FREQUENCY: CONTINUOUS

## 2018-11-24 ASSESSMENT — PAIN SCALES - GENERAL
PAINLEVEL_OUTOF10: 8
PAINLEVEL_OUTOF10: 8
PAINLEVEL_OUTOF10: 6
PAINLEVEL_OUTOF10: 6
PAINLEVEL_OUTOF10: 0

## 2018-11-24 ASSESSMENT — PAIN DESCRIPTION - LOCATION
LOCATION: ABDOMEN
LOCATION: ABDOMEN;BACK
LOCATION: ABDOMEN;BACK
LOCATION: PERINEUM;BACK

## 2018-11-24 ASSESSMENT — PAIN DESCRIPTION - ORIENTATION
ORIENTATION: LOWER

## 2018-11-24 ASSESSMENT — PAIN DESCRIPTION - PROGRESSION
CLINICAL_PROGRESSION: NOT CHANGED

## 2018-11-24 ASSESSMENT — PAIN DESCRIPTION - ONSET
ONSET: ON-GOING

## 2018-11-24 NOTE — ED PROVIDER NOTES
HPI:  11/24/18,   Time: 2:17 PM         Shara Lombard is a 80 y.o. female presenting to the ED for cough and weakness, beginning more than 1 day ago. The complaint has been persistent, moderate in severity, and worsened by recent UTI, was here a few days ago and was started on antibiotics feels she is not getting better. Has history of sepsis in the past    ROS:   Pertinent positives and negatives are stated within HPI, all other systems reviewed and are negative.  --------------------------------------------- PAST HISTORY ---------------------------------------------  Past Medical History:  has a past medical history of Arthritis; Asthma; AVB (atrioventricular block); Cancer ([facility] [address] Chest pain; Chronic kidney disease, stage 3 ([facility] [address] Complicated UTI (urinary tract infection); COPD (chronic obstructive pulmonary disease) ([facility]); COPD (chronic obstructive pulmonary disease) ([facility] [address] GERD (gastroesophageal reflux disease); Hypertension; and Symptomatic bradycardia. Past Surgical History:  has a past surgical history that includes Cholecystectomy; Kyphosis surgery; Echo Complete (6/20/2012); pacemaker placement (6-); Hysterectomy; back surgery; Endoscopy, colon, diagnostic; Colonoscopy; Tonsillectomy; Cardiac catheterization (02/10/2016); Appendectomy; and joint replacement. Social History:  reports that she quit smoking about 36 years ago. Her smoking use included Cigarettes. She started smoking about 53 years ago. She has a 17.00 pack-year smoking history. She has never used smokeless tobacco. She reports that she does not drink alcohol or use drugs. Family History: family history includes Asthma in her father; Heart Attack in her mother; Heart Disease in her mother; Other in her father. The patients home medications have been reviewed. Allergies: Codeine; Amlodipine; Augmentin [amoxicillin-pot clavulanate]; Bactrim; Benadryl [diphenhydramine];  Brovana [arformoterol]; Cardizem [diltiazem hcl]; 9.3 2.0 - 12.0 %    Eosinophils % 2.3 0.0 - 6.0 %    Basophils % 0.5 0.0 - 2.0 %    Neutrophils # 10.63 (H) 1.80 - 7.30 E9/L    Immature Granulocytes # 0.08 E9/L    Lymphocytes # 2.02 1.50 - 4.00 E9/L    Monocytes # 1.35 (H) 0.10 - 0.95 E9/L    Eosinophils # 0.33 0.05 - 0.50 E9/L    Basophils # 0.07 0.00 - 0.20 E9/L   Troponin   Result Value Ref Range    Troponin <0.01 0.00 - 0.03 ng/mL   Microscopic Urinalysis   Result Value Ref Range    WBC, UA PACKED 0 - 5 /HPF    RBC, UA >20 0 - 2 /HPF    Epi Cells FEW /HPF    Bacteria, UA MANY (A) /HPF   EKG 12 Lead   Result Value Ref Range    Ventricular Rate 82 BPM    Atrial Rate 82 BPM    P-R Interval 158 ms    QRS Duration 68 ms    Q-T Interval 380 ms    QTc Calculation (Bazett) 443 ms    P Axis 16 degrees    R Axis -18 degrees    T Axis 18 degrees       RADIOLOGY:  Interpreted by Radiologist.  XR CHEST STANDARD (2 VW)   Final Result   ALERT:  THIS IS AN ABNORMAL REPORT   1. Bibasilar airspace disease suspected to reflect pneumonia or   atelectasis or combination of the above, clinical correlation   recommended. 2. Stable, enlarged cardiac mediastinal silhouette with central   pulmonary vascular congestion and thoracic aortic vascular   calcifications. CT ABDOMEN PELVIS W WO CONTRAST Additional Contrast? Oral    (Results Pending)       ------------------------- NURSING NOTES AND VITALS REVIEWED ---------------------------   The nursing notes within the ED encounter and vital signs as below have been reviewed.    BP (!) 100/58   Pulse 71   Temp 97.1 °F (36.2 °C) (Oral)   Resp 20   Ht 5' 1\" (1.549 m)   Wt 149 lb 8 oz (67.8 kg)   SpO2 96%   BMI 28.25 kg/m²   Oxygen Saturation Interpretation: Normal      ---------------------------------------------------PHYSICAL EXAM--------------------------------------      Constitutional/General: Alert and oriented x3, well appearing, non toxic in NAD  Head: NC/AT  Eyes: PERRL, EOMI  Mouth: Oropharynx clear, handling answered at this time and they are agreeable with the plan.      --------------------------------- IMPRESSION AND DISPOSITION ---------------------------------    IMPRESSION  1.  Complicated UTI (urinary tract infection)        DISPOSITION  Disposition: Admit to telemetry  Patient condition is stable                 Verna Chang MD  11/25/18 500 Leanne Ly MD  11/25/18 [id number]

## 2018-11-25 ENCOUNTER — APPOINTMENT (OUTPATIENT)
Dept: CT IMAGING | Age: 83
DRG: 690 | End: 2018-11-25
Payer: MEDICARE

## 2018-11-25 LAB
ALBUMIN SERPL-MCNC: 3.2 G/DL (ref 3.5–5.2)
ALP BLD-CCNC: 109 U/L (ref 35–104)
ALT SERPL-CCNC: 26 U/L (ref 0–32)
ANION GAP SERPL CALCULATED.3IONS-SCNC: 12 MMOL/L (ref 7–16)
AST SERPL-CCNC: 24 U/L (ref 0–31)
BILIRUB SERPL-MCNC: 0.4 MG/DL (ref 0–1.2)
BUN BLDV-MCNC: 21 MG/DL (ref 8–23)
CALCIUM SERPL-MCNC: 8.4 MG/DL (ref 8.6–10.2)
CHLORIDE BLD-SCNC: 104 MMOL/L (ref 98–107)
CO2: 23 MMOL/L (ref 22–29)
CREAT SERPL-MCNC: 1.1 MG/DL (ref 0.5–1)
GFR AFRICAN AMERICAN: 57
GFR NON-AFRICAN AMERICAN: 47 ML/MIN/1.73
GLUCOSE BLD-MCNC: 95 MG/DL (ref 74–99)
HCT VFR BLD CALC: 36.6 % (ref 34–48)
HEMOGLOBIN: 11.4 G/DL (ref 11.5–15.5)
MCH RBC QN AUTO: 26.3 PG (ref 26–35)
MCHC RBC AUTO-ENTMCNC: 31.1 % (ref 32–34.5)
MCV RBC AUTO: 84.5 FL (ref 80–99.9)
PDW BLD-RTO: 13.5 FL (ref 11.5–15)
PLATELET # BLD: 247 E9/L (ref 130–450)
PMV BLD AUTO: 10.1 FL (ref 7–12)
POTASSIUM SERPL-SCNC: 3.5 MMOL/L (ref 3.5–5)
RBC # BLD: 4.33 E12/L (ref 3.5–5.5)
SODIUM BLD-SCNC: 139 MMOL/L (ref 132–146)
TOTAL PROTEIN: 6.2 G/DL (ref 6.4–8.3)
WBC # BLD: 12.5 E9/L (ref 4.5–11.5)

## 2018-11-25 PROCEDURE — 2580000003 HC RX 258: Performed by: INTERNAL MEDICINE

## 2018-11-25 PROCEDURE — 6360000002 HC RX W HCPCS: Performed by: INTERNAL MEDICINE

## 2018-11-25 PROCEDURE — 85027 COMPLETE CBC AUTOMATED: CPT

## 2018-11-25 PROCEDURE — 6360000002 HC RX W HCPCS: Performed by: SPECIALIST

## 2018-11-25 PROCEDURE — 6360000004 HC RX CONTRAST MEDICATION: Performed by: RADIOLOGY

## 2018-11-25 PROCEDURE — 2140000000 HC CCU INTERMEDIATE R&B

## 2018-11-25 PROCEDURE — 2580000003 HC RX 258: Performed by: SPECIALIST

## 2018-11-25 PROCEDURE — 80053 COMPREHEN METABOLIC PANEL: CPT

## 2018-11-25 PROCEDURE — 36415 COLL VENOUS BLD VENIPUNCTURE: CPT

## 2018-11-25 PROCEDURE — 6370000000 HC RX 637 (ALT 250 FOR IP): Performed by: INTERNAL MEDICINE

## 2018-11-25 PROCEDURE — 74178 CT ABD&PLV WO CNTR FLWD CNTR: CPT

## 2018-11-25 PROCEDURE — 2580000003 HC RX 258

## 2018-11-25 RX ORDER — POTASSIUM CHLORIDE 20 MEQ/1
40 TABLET, EXTENDED RELEASE ORAL ONCE
Status: COMPLETED | OUTPATIENT
Start: 2018-11-25 | End: 2018-11-25

## 2018-11-25 RX ORDER — IRBESARTAN 150 MG/1
150 TABLET ORAL DAILY
Status: DISCONTINUED | OUTPATIENT
Start: 2018-11-25 | End: 2018-11-27 | Stop reason: HOSPADM

## 2018-11-25 RX ORDER — SODIUM CHLORIDE 0.9 % (FLUSH) 0.9 %
SYRINGE (ML) INJECTION
Status: COMPLETED
Start: 2018-11-25 | End: 2018-11-25

## 2018-11-25 RX ADMIN — TRIAMTERENE AND HYDROCHLOROTHIAZIDE 1 TABLET: 37.5; 25 TABLET ORAL at 10:34

## 2018-11-25 RX ADMIN — Medication 10 ML: at 15:55

## 2018-11-25 RX ADMIN — ERTAPENEM SODIUM 1000 MG: 1 INJECTION, POWDER, LYOPHILIZED, FOR SOLUTION INTRAMUSCULAR; INTRAVENOUS at 21:05

## 2018-11-25 RX ADMIN — TRAMADOL HYDROCHLORIDE 50 MG: 50 TABLET, FILM COATED ORAL at 19:55

## 2018-11-25 RX ADMIN — ENOXAPARIN SODIUM 30 MG: 30 INJECTION, SOLUTION INTRAVENOUS; SUBCUTANEOUS at 10:34

## 2018-11-25 RX ADMIN — SODIUM CHLORIDE: 9 INJECTION, SOLUTION INTRAVENOUS at 05:26

## 2018-11-25 RX ADMIN — SODIUM CHLORIDE: 9 INJECTION, SOLUTION INTRAVENOUS at 18:41

## 2018-11-25 RX ADMIN — VITAMIN D, TAB 1000IU (100/BT) 1000 UNITS: 25 TAB at 10:34

## 2018-11-25 RX ADMIN — IOPAMIDOL 110 ML: 755 INJECTION, SOLUTION INTRAVENOUS at 13:07

## 2018-11-25 RX ADMIN — POTASSIUM CHLORIDE 40 MEQ: 20 TABLET, EXTENDED RELEASE ORAL at 15:42

## 2018-11-25 RX ADMIN — IRBESARTAN 150 MG: 150 TABLET ORAL at 15:43

## 2018-11-25 RX ADMIN — IOHEXOL 50 ML: 240 INJECTION, SOLUTION INTRATHECAL; INTRAVASCULAR; INTRAVENOUS; ORAL at 13:07

## 2018-11-25 RX ADMIN — TRAMADOL HYDROCHLORIDE 50 MG: 50 TABLET, FILM COATED ORAL at 05:25

## 2018-11-25 ASSESSMENT — PAIN SCALES - GENERAL
PAINLEVEL_OUTOF10: 0
PAINLEVEL_OUTOF10: 5
PAINLEVEL_OUTOF10: 0
PAINLEVEL_OUTOF10: 4

## 2018-11-25 ASSESSMENT — PAIN DESCRIPTION - LOCATION: LOCATION: HEAD

## 2018-11-25 ASSESSMENT — PAIN DESCRIPTION - PAIN TYPE: TYPE: ACUTE PAIN

## 2018-11-25 ASSESSMENT — PAIN DESCRIPTION - DESCRIPTORS: DESCRIPTORS: HEADACHE

## 2018-11-25 ASSESSMENT — PAIN DESCRIPTION - ORIENTATION: ORIENTATION: ANTERIOR

## 2018-11-25 ASSESSMENT — PAIN DESCRIPTION - FREQUENCY: FREQUENCY: CONTINUOUS

## 2018-11-25 NOTE — H&P
nausea. No vomiting. No black  or bloody stools. Lower suprapubic abdominal pain and some left lower  quadrant abdominal pain as well. GENITOURINARY:  She has dysuria, frequency, hesitancy, suprapubic pain. No hematuria. MUSCULOSKELETAL:  Chronic muscle and joint pain, nothing new. HEMATOLOGIC:  Denies. PSYCHIATRIC:  Denies. PHYSICAL EXAMINATION:  GENERAL:  The patient is a pleasant 51-year-old female who is alert and  oriented, in no acute distress at this time. HEENT:  Head is normal size and shape. Pupils were equal and reactive. Sclerae were clear. Extraocular movements were intact. Fundi were not  visualized. Tympanic membranes were not visualized. Oral mucous  membranes were clear, somewhat dry to inspection. NECK:  Supple. Neck veins were flat. No palpable cervical  lymphadenopathy or extrinsic neck masses. No thyromegaly. Carotid  bruits were not auscultated. No jugular venous distention. LUNGS:  Diminished breath sounds bilaterally. CARDIOVASCULAR:  Regular rate and rhythm without murmurs. ABDOMEN:  Soft with minimal tenderness, suprapubic in left lower  quadrant. Bowel sounds intact. Femoral pulses symmetric. No rebound  tenderness. GENITOURINARY:  Deferred. RECTAL:  Deferred. EXTREMITIES:  No edema, no clubbing, no cyanosis. No asymmetry of lower  extremity circumference. Distal pulses were intact. NEUROLOGIC:  Deep tendon reflexes were intact. No focal neurologic  deficits. CLINICAL IMPRESSION:  1. Complicated urinary tract infection, history of ESBL UTI. 2.  History of atrioventricular block with pacemaker. 3.  Osteoarthritis. 4.  Hypertension. 5.  Chronic kidney disease. 6.  Chronic obstructive pulmonary disease.         Emiliano Henderson DO    D: 11/25/2018 12:17:26       T: 11/25/2018 13:22:59     [email or web address]  Job#: [id number]     Doc#: [id number]    CC:

## 2018-11-26 ENCOUNTER — APPOINTMENT (OUTPATIENT)
Dept: GENERAL RADIOLOGY | Age: 83
DRG: 690 | End: 2018-11-26
Payer: MEDICARE

## 2018-11-26 LAB
ALBUMIN SERPL-MCNC: 2.8 G/DL (ref 3.5–5.2)
ALP BLD-CCNC: 95 U/L (ref 35–104)
ALT SERPL-CCNC: 21 U/L (ref 0–32)
ANION GAP SERPL CALCULATED.3IONS-SCNC: 11 MMOL/L (ref 7–16)
AST SERPL-CCNC: 22 U/L (ref 0–31)
BILIRUB SERPL-MCNC: 0.4 MG/DL (ref 0–1.2)
BUN BLDV-MCNC: 14 MG/DL (ref 8–23)
CALCIUM SERPL-MCNC: 8.4 MG/DL (ref 8.6–10.2)
CHLORIDE BLD-SCNC: 105 MMOL/L (ref 98–107)
CO2: 23 MMOL/L (ref 22–29)
CREAT SERPL-MCNC: 0.9 MG/DL (ref 0.5–1)
GFR AFRICAN AMERICAN: >60
GFR NON-AFRICAN AMERICAN: 60 ML/MIN/1.73
GLUCOSE BLD-MCNC: 88 MG/DL (ref 74–99)
HCT VFR BLD CALC: 35.4 % (ref 34–48)
HEMOGLOBIN: 11.3 G/DL (ref 11.5–15.5)
MCH RBC QN AUTO: 27 PG (ref 26–35)
MCHC RBC AUTO-ENTMCNC: 31.9 % (ref 32–34.5)
MCV RBC AUTO: 84.7 FL (ref 80–99.9)
PDW BLD-RTO: 13.6 FL (ref 11.5–15)
PLATELET # BLD: 210 E9/L (ref 130–450)
PMV BLD AUTO: 9.8 FL (ref 7–12)
POTASSIUM SERPL-SCNC: 4.2 MMOL/L (ref 3.5–5)
RBC # BLD: 4.18 E12/L (ref 3.5–5.5)
SODIUM BLD-SCNC: 139 MMOL/L (ref 132–146)
TOTAL PROTEIN: 5.8 G/DL (ref 6.4–8.3)
WBC # BLD: 8 E9/L (ref 4.5–11.5)

## 2018-11-26 PROCEDURE — 6370000000 HC RX 637 (ALT 250 FOR IP): Performed by: INTERNAL MEDICINE

## 2018-11-26 PROCEDURE — 6360000002 HC RX W HCPCS: Performed by: INTERNAL MEDICINE

## 2018-11-26 PROCEDURE — 6360000002 HC RX W HCPCS: Performed by: SPECIALIST

## 2018-11-26 PROCEDURE — 2580000003 HC RX 258: Performed by: SPECIALIST

## 2018-11-26 PROCEDURE — 2580000003 HC RX 258: Performed by: INTERNAL MEDICINE

## 2018-11-26 PROCEDURE — 85027 COMPLETE CBC AUTOMATED: CPT

## 2018-11-26 PROCEDURE — 36415 COLL VENOUS BLD VENIPUNCTURE: CPT

## 2018-11-26 PROCEDURE — 74018 RADEX ABDOMEN 1 VIEW: CPT

## 2018-11-26 PROCEDURE — 2140000000 HC CCU INTERMEDIATE R&B

## 2018-11-26 PROCEDURE — 80053 COMPREHEN METABOLIC PANEL: CPT

## 2018-11-26 RX ORDER — ONDANSETRON 2 MG/ML
4 INJECTION INTRAMUSCULAR; INTRAVENOUS EVERY 6 HOURS PRN
Status: DISCONTINUED | OUTPATIENT
Start: 2018-11-26 | End: 2018-11-26

## 2018-11-26 RX ORDER — ONDANSETRON 2 MG/ML
8 INJECTION INTRAMUSCULAR; INTRAVENOUS EVERY 6 HOURS PRN
Status: DISCONTINUED | OUTPATIENT
Start: 2018-11-26 | End: 2018-11-27 | Stop reason: HOSPADM

## 2018-11-26 RX ADMIN — TRIAMTERENE AND HYDROCHLOROTHIAZIDE 1 TABLET: 37.5; 25 TABLET ORAL at 08:47

## 2018-11-26 RX ADMIN — IRBESARTAN 150 MG: 150 TABLET ORAL at 08:47

## 2018-11-26 RX ADMIN — ENOXAPARIN SODIUM 30 MG: 30 INJECTION, SOLUTION INTRAVENOUS; SUBCUTANEOUS at 08:56

## 2018-11-26 RX ADMIN — VITAMIN D, TAB 1000IU (100/BT) 1000 UNITS: 25 TAB at 08:47

## 2018-11-26 RX ADMIN — PROMETHAZINE HYDROCHLORIDE 6.25 MG: 25 TABLET ORAL at 21:09

## 2018-11-26 RX ADMIN — ONDANSETRON 4 MG: 2 INJECTION INTRAMUSCULAR; INTRAVENOUS at 13:17

## 2018-11-26 RX ADMIN — ERTAPENEM SODIUM 1000 MG: 1 INJECTION, POWDER, LYOPHILIZED, FOR SOLUTION INTRAMUSCULAR; INTRAVENOUS at 21:09

## 2018-11-26 RX ADMIN — TRAMADOL HYDROCHLORIDE 50 MG: 50 TABLET, FILM COATED ORAL at 22:41

## 2018-11-26 RX ADMIN — PROMETHAZINE HYDROCHLORIDE 6.25 MG: 25 TABLET ORAL at 08:48

## 2018-11-26 RX ADMIN — SODIUM CHLORIDE: 9 INJECTION, SOLUTION INTRAVENOUS at 06:10

## 2018-11-26 RX ADMIN — SODIUM CHLORIDE: 9 INJECTION, SOLUTION INTRAVENOUS at 15:52

## 2018-11-26 ASSESSMENT — PAIN DESCRIPTION - FREQUENCY: FREQUENCY: CONTINUOUS

## 2018-11-26 ASSESSMENT — PAIN DESCRIPTION - PAIN TYPE
TYPE: ACUTE PAIN
TYPE: ACUTE PAIN

## 2018-11-26 ASSESSMENT — PAIN DESCRIPTION - LOCATION: LOCATION: GENERALIZED

## 2018-11-26 ASSESSMENT — PAIN SCALES - GENERAL
PAINLEVEL_OUTOF10: 0
PAINLEVEL_OUTOF10: 6
PAINLEVEL_OUTOF10: 0

## 2018-11-26 ASSESSMENT — PAIN DESCRIPTION - ONSET: ONSET: ON-GOING

## 2018-11-26 ASSESSMENT — PAIN DESCRIPTION - PROGRESSION: CLINICAL_PROGRESSION: NOT CHANGED

## 2018-11-26 ASSESSMENT — PAIN DESCRIPTION - DESCRIPTORS: DESCRIPTORS: ACHING;CRAMPING;DISCOMFORT

## 2018-11-26 NOTE — PROGRESS NOTES
GLUCOSE  148*  95  88         Hepatic Function Panel:    Lab Results   Component Value Date    ALKPHOS 95 11/26/2018    ALT 21 11/26/2018    AST 22 11/26/2018    PROT 5.8 11/26/2018    BILITOT 0.4 11/26/2018    BILIDIR <0.2 07/29/2016    IBILI 0.1 07/29/2016    LABALBU 2.8 11/26/2018       Lab Results   Component Value Date    SEDRATE 21 (H) 07/25/2018       . Lab Results   Component Value Date    CRP 2.0 (H) 07/25/2018         Microbiology :  Recent Labs      11/24/18   1415   BC  24 Hours- no growth     No results for input(s): Nicholas Olszewski in the last 72 hours. Recent Labs      11/24/18   1400   LABURIN  >100,000 CFU/ml  Sensitivity to follow  This organism possesses an Extended Spectrum Beta Lactamase  that is inhibited by Clavulanic Acid. No results for input(s): CULTRESP in the last 72 hours. No results for input(s): WNDABS in the last 72 hours. Radiology :  CT ABDOMEN PELVIS W WO CONTRAST Additional Contrast? Oral   Final Result      FINDINGS OF CYSTITIS. NO EVIDENCE OF RENAL ABSCESS. ECTASIA OF THE INFRARENAL ABDOMINAL AORTA MEASURING UP TO 2.6 MM.         ==========         XR CHEST STANDARD (2 VW)   Final Result   ALERT:  THIS IS AN ABNORMAL REPORT   1. Bibasilar airspace disease suspected to reflect pneumonia or   atelectasis or combination of the above, clinical correlation   recommended. 2. Stable, enlarged cardiac mediastinal silhouette with central   pulmonary vascular congestion and thoracic aortic vascular   calcifications.             ASSESSMENT:   Recurrent UTI history of ESBL  R/O Nephronia    PLAN:  Nausea ++   CT with IV contrast - no  Nephronia, cystitis   C/W IV Ertapenem  Monitor labs and cultures      Juaquin Gaviria MD  11/26/2018  1:29 PM

## 2018-11-26 NOTE — PLAN OF CARE
Problem: Pain:  Goal: Pain level will decrease  Pain level will decrease   Outcome: Met This Shift  Pt pain will be controlled

## 2018-11-27 VITALS
TEMPERATURE: 98.1 F | HEIGHT: 61 IN | SYSTOLIC BLOOD PRESSURE: 130 MMHG | OXYGEN SATURATION: 95 % | DIASTOLIC BLOOD PRESSURE: 56 MMHG | BODY MASS INDEX: 28.22 KG/M2 | RESPIRATION RATE: 18 BRPM | WEIGHT: 149.5 LBS | HEART RATE: 70 BPM

## 2018-11-27 PROBLEM — K52.9 ACUTE GASTROENTERITIS: Status: ACTIVE | Noted: 2018-11-27

## 2018-11-27 PROBLEM — E87.6 HYPOKALEMIA: Status: ACTIVE | Noted: 2018-11-27

## 2018-11-27 LAB
ALBUMIN SERPL-MCNC: 2.9 G/DL (ref 3.5–5.2)
ALP BLD-CCNC: 98 U/L (ref 35–104)
ALT SERPL-CCNC: 24 U/L (ref 0–32)
ANION GAP SERPL CALCULATED.3IONS-SCNC: 14 MMOL/L (ref 7–16)
AST SERPL-CCNC: 38 U/L (ref 0–31)
BILIRUB SERPL-MCNC: 0.4 MG/DL (ref 0–1.2)
BUN BLDV-MCNC: 11 MG/DL (ref 8–23)
CALCIUM SERPL-MCNC: 8.4 MG/DL (ref 8.6–10.2)
CHLORIDE BLD-SCNC: 104 MMOL/L (ref 98–107)
CO2: 17 MMOL/L (ref 22–29)
CREAT SERPL-MCNC: 0.9 MG/DL (ref 0.5–1)
GFR AFRICAN AMERICAN: >60
GFR NON-AFRICAN AMERICAN: 60 ML/MIN/1.73
GLUCOSE BLD-MCNC: 81 MG/DL (ref 74–99)
HCT VFR BLD CALC: 36.4 % (ref 34–48)
HEMOGLOBIN: 11.7 G/DL (ref 11.5–15.5)
MCH RBC QN AUTO: 26.8 PG (ref 26–35)
MCHC RBC AUTO-ENTMCNC: 32.1 % (ref 32–34.5)
MCV RBC AUTO: 83.3 FL (ref 80–99.9)
ORGANISM: ABNORMAL
PDW BLD-RTO: 13.4 FL (ref 11.5–15)
PLATELET # BLD: 218 E9/L (ref 130–450)
PMV BLD AUTO: 9.9 FL (ref 7–12)
POTASSIUM SERPL-SCNC: 3.9 MMOL/L (ref 3.5–5)
RBC # BLD: 4.37 E12/L (ref 3.5–5.5)
SODIUM BLD-SCNC: 135 MMOL/L (ref 132–146)
TOTAL PROTEIN: 6.2 G/DL (ref 6.4–8.3)
URINE CULTURE, ROUTINE: ABNORMAL
URINE CULTURE, ROUTINE: ABNORMAL
WBC # BLD: 10.7 E9/L (ref 4.5–11.5)

## 2018-11-27 PROCEDURE — C1751 CATH, INF, PER/CENT/MIDLINE: HCPCS

## 2018-11-27 PROCEDURE — 85027 COMPLETE CBC AUTOMATED: CPT

## 2018-11-27 PROCEDURE — 05HB33Z INSERTION OF INFUSION DEVICE INTO RIGHT BASILIC VEIN, PERCUTANEOUS APPROACH: ICD-10-PCS | Performed by: SPECIALIST

## 2018-11-27 PROCEDURE — 6370000000 HC RX 637 (ALT 250 FOR IP): Performed by: INTERNAL MEDICINE

## 2018-11-27 PROCEDURE — 2580000003 HC RX 258: Performed by: SPECIALIST

## 2018-11-27 PROCEDURE — 6360000002 HC RX W HCPCS: Performed by: SPECIALIST

## 2018-11-27 PROCEDURE — 2580000003 HC RX 258: Performed by: INTERNAL MEDICINE

## 2018-11-27 PROCEDURE — 80053 COMPREHEN METABOLIC PANEL: CPT

## 2018-11-27 PROCEDURE — 36415 COLL VENOUS BLD VENIPUNCTURE: CPT

## 2018-11-27 PROCEDURE — 6360000002 HC RX W HCPCS: Performed by: INTERNAL MEDICINE

## 2018-11-27 RX ORDER — IRBESARTAN 150 MG/1
150 TABLET ORAL DAILY
Qty: 30 TABLET | Refills: 3 | Status: ON HOLD | OUTPATIENT
Start: 2018-11-28 | End: 2019-01-29 | Stop reason: HOSPADM

## 2018-11-27 RX ADMIN — ACETAMINOPHEN 650 MG: 325 TABLET, FILM COATED ORAL at 09:19

## 2018-11-27 RX ADMIN — ENOXAPARIN SODIUM 30 MG: 30 INJECTION, SOLUTION INTRAVENOUS; SUBCUTANEOUS at 09:19

## 2018-11-27 RX ADMIN — TRIAMTERENE AND HYDROCHLOROTHIAZIDE 1 TABLET: 37.5; 25 TABLET ORAL at 09:19

## 2018-11-27 RX ADMIN — VITAMIN D, TAB 1000IU (100/BT) 1000 UNITS: 25 TAB at 09:19

## 2018-11-27 RX ADMIN — ACETAMINOPHEN 650 MG: 325 TABLET, FILM COATED ORAL at 14:51

## 2018-11-27 RX ADMIN — SODIUM CHLORIDE: 9 INJECTION, SOLUTION INTRAVENOUS at 01:43

## 2018-11-27 RX ADMIN — ERTAPENEM SODIUM 1000 MG: 1 INJECTION, POWDER, LYOPHILIZED, FOR SOLUTION INTRAMUSCULAR; INTRAVENOUS at 18:05

## 2018-11-27 RX ADMIN — IRBESARTAN 150 MG: 150 TABLET ORAL at 09:19

## 2018-11-27 RX ADMIN — SODIUM CHLORIDE: 9 INJECTION, SOLUTION INTRAVENOUS at 12:49

## 2018-11-27 ASSESSMENT — PAIN SCALES - GENERAL
PAINLEVEL_OUTOF10: 0
PAINLEVEL_OUTOF10: 6
PAINLEVEL_OUTOF10: 1
PAINLEVEL_OUTOF10: 5
PAINLEVEL_OUTOF10: 0

## 2018-11-27 ASSESSMENT — PAIN DESCRIPTION - PROGRESSION: CLINICAL_PROGRESSION: GRADUALLY IMPROVING

## 2018-11-27 ASSESSMENT — PAIN DESCRIPTION - FREQUENCY: FREQUENCY: INTERMITTENT

## 2018-11-27 ASSESSMENT — PAIN DESCRIPTION - ORIENTATION: ORIENTATION: LOWER

## 2018-11-27 ASSESSMENT — PAIN DESCRIPTION - ONSET: ONSET: ON-GOING

## 2018-11-27 ASSESSMENT — PAIN DESCRIPTION - LOCATION: LOCATION: BACK;HEAD

## 2018-11-27 ASSESSMENT — PAIN DESCRIPTION - PAIN TYPE: TYPE: ACUTE PAIN

## 2018-11-27 NOTE — PROGRESS NOTES
[facility]   Department of Pharmacy   Pharmacist Transition of Care Services         Patient Demographics  Name:  Kriss Devine Dr Record Number:  [id number]  Gender:  female   Age:  80 y.o.   YOB: 1935    Address:    [address] [place] [address]  Phone number:  [phone]    Admission date: 11/24/2018  Admission Diagnosis:  UTI due to extended-spectrum beta lactamase (ESBL) producing Escherichia coli [N39.0, J24.30, X05.53]  Complicated urinary tract infection [U47.0]  Complicated UTI (urinary tract infection) [N39.0]  Admitting Physician: Darnell Powell MD    Primary Care Physician: Darnell Powell MD  Primary Care Physician phone number:  [phone]   Outpatient Preferred Pharmacy:   [facility] [phone] - f [phone]  [address]  Phone: [phone] Fax: [phone] [facility] [phone] - f [phone]  [address] [place] [address]  Phone: [phone] Fax: [phone]        Readmission Risk (% from EPIC Patient List): 15 %       Patient plans to participate in [address] [place], [place] [address]ISTON (Y/N): yes      Pharmacist Review and Summary of Medication Changes Made During Admission     Date of last reviewed/update: 11/27/18    Sources(s) of medication information (check all that apply):  [x] EPIC - documentation for current admission  [] EPIC - documentation of recent physician office visit, Care Everywhere chart   [] Patient - interview or personal medication list   [] Patient's family/caretaker/POA/friend   [] Outpatient Pharmacy - phone call   [] Outpatient Pharmacy - medication bottles  [] Physician's office - phone call   [] OARRS Report  [] Nursing home/rehab/assisted living - printed medication list  [] Nursing home/rehab/assisted living - phone call      Category Comments  (Include Pharmacist Initials and Date)   Change in Outpatient Medication  (Dosage Form, Route,   Dose, or Frequency) 1. New Medication Started 1. invanz 1gram IV Q24hrs  2. Irbesartan 150mg PO daily          Outpatient Medication Discontinued   (or on Hold During Admission) 1. Losartan- discontinued in hospital          Other 1. Pharmacist Patient Education:    Pharmacist Education Log:   Date  Person Educated Content of Education and   Printed Materials Provided     (Include Pharmacist Initials)                                 If applicable:   [x]  Unable to be complete education at this time due to:  Patient not yet discharged       []  Barriers to counseling were identified: **    []  Follow-up education is required: **     []  In addition to the above, the patient was provided with the following additional        compliance tools: **     Documentation of Pharmacist Interventions and Follow-up Plan:   The following Pharmacist Transition of Care Services were completed during the admission:  []  Entire home medication list was reviewed for accuracy (best possible medication        history was obtained)   []  Home medication list was updated or corrected     []  Reviewed and summarized home medication changes and new inpatient         medications    []  Patient education was provided on home medication changes  []  Patient education was provided on new inpatient medications    The following Pharmacist Transition of Care Services were completed as part of the discharge process:  []  Reviewed and/or assisted with discharge medication reconciliation  []  Discharge patient medication education was provided   []  Reviewed the After Visit Summary (AVS) with patient      Additional Interventions:  []  Inpatient prescriber was contacted and the following pharmacy recommendations        were accepted: **    [] Outpatient primary care physician was informed of medication changes that were       made during

## 2018-11-27 NOTE — PROGRESS NOTES
GLUCOSE  95  88  81         Hepatic Function Panel:    Lab Results   Component Value Date    ALKPHOS 98 11/27/2018    ALT 24 11/27/2018    AST 38 11/27/2018    PROT 6.2 11/27/2018    BILITOT 0.4 11/27/2018    BILIDIR <0.2 07/29/2016    IBILI 0.1 07/29/2016    LABALBU 2.9 11/27/2018       Lab Results   Component Value Date    SEDRATE 21 (H) 07/25/2018       . Lab Results   Component Value Date    CRP 2.0 (H) 07/25/2018         Microbiology :  Recent Labs      11/24/18   1415   BC  24 Hours- no growth     No results for input(s): Levar Lay in the last 72 hours. Recent Labs      11/24/18   1400   LABURIN  >100,000 CFU/ml  This organism possesses an Extended Spectrum Beta Lactamase  that is inhibited by Clavulanic Acid. This isolate demonstrated resistance to multiple classes of  antibiotics. Please review results with care and follow  isolation guidelines as indicated. No results for input(s): CULTRESP in the last 72 hours. No results for input(s): WNDABS in the last 72 hours. Radiology :  XR ABDOMEN (KUB) (SINGLE AP VIEW)   Final Result   No indication for bowel obstruction. CT ABDOMEN PELVIS W WO CONTRAST Additional Contrast? Oral   Final Result      FINDINGS OF CYSTITIS. NO EVIDENCE OF RENAL ABSCESS. ECTASIA OF THE INFRARENAL ABDOMINAL AORTA MEASURING UP TO 2.6 MM.         ==========         XR CHEST STANDARD (2 VW)   Final Result   ALERT:  THIS IS AN ABNORMAL REPORT   1. Bibasilar airspace disease suspected to reflect pneumonia or   atelectasis or combination of the above, clinical correlation   recommended. 2. Stable, enlarged cardiac mediastinal silhouette with central   pulmonary vascular congestion and thoracic aortic vascular   calcifications.             ASSESSMENT:   Recurrent UTI history of ESBL  R/O Nephronia    PLAN:  Nausea + encouraged to take zofran   CT with IV contrast - no  Nephronia, cystitis   C/W IV Ertapenem for 11 more days  Midline  Scripts done  Monitor labs and cultures      Cyndi Smith MD  11/27/2018  11:23 AM

## 2018-11-27 NOTE — PLAN OF CARE
Problem: Pain:  Goal: Pain level will decrease  Pain level will decrease   Outcome: Not Met This Shift    Goal: Control of acute pain  Control of acute pain   Outcome: Not Met This Shift    Goal: Control of chronic pain  Control of chronic pain   Outcome: Met This Shift      Problem: Falls - Risk of:  Goal: Will remain free from falls  Will remain free from falls   Outcome: Met This Shift    Goal: Absence of physical injury  Absence of physical injury   Outcome: Met This Shift      Problem: Sensory:  Goal: General experience of comfort will improve  General experience of comfort will improve   Outcome: Not Met This Shift      Problem: Urinary Elimination:  Goal: Signs and symptoms of infection will decrease  Signs and symptoms of infection will decrease   Outcome: Not Met This Shift    Goal: Ability to reestablish a normal urinary elimination pattern will improve - after catheter removal  Ability to reestablish a normal urinary elimination pattern will improve   Outcome: Not Met This Shift    Goal: Complications related to the disease process, condition or treatment will be avoided or minimized  Complications related to the disease process, condition or treatment will be avoided or minimized   Outcome: Not Met This Shift

## 2018-11-29 LAB — BLOOD CULTURE, ROUTINE: NORMAL

## 2018-11-29 NOTE — DISCHARGE SUMMARY
[address]                  [address] [facility],  [facility]                               DISCHARGE SUMMARY    PATIENT NAME: ED MIRANDA                        :        1935  MED REC NO:   [id number]                            ROOM:       6402  ACCOUNT NO:   [de-identified]                           ADMIT DATE: 2018  PROVIDER:     Ray Renteria DO                  DISCHARGE DATE:  2018    HOSPITAL COURSE:  This patient is an 80-year-old female with history of  COPD, chronic kidney disease, hypothyroidism, recurrent complicated  urinary tract infection, previous ESBL UTI, history of atrioventricular  block and subsequent pacemaker, who describes onset of symptoms over a  several week period. She states that for the last week or two, she has  had intermittent and recurrent dysuria with lower abdomen suprapubic  pain, some frequency, overall generalized fatigability, malaise and some  diarrhea. She was seen in emergency room and started on Omnicef without  urinary culture done. She returned to the ER and was admitted at this  time to Telemetry for definitive treatment. Cultures of urine and blood  were sent. She was begun on intravenous Invanz by Infectious Diseases  after the cultures were sent. CT of the abdomen and pelvis was largely  unremarkable with the exception of some local cystitis. Her subsequent  course was complicated by worsening nausea and vomiting perhaps  secondary to the intravenous Invanz. KUB was negative. CBC was  negative. Physical exam findings were largely unremarkable. She was  continued on the antibiotics. She was also hydrated because she was  dehydrated on admission. She had hypokalemia that was corrected. She  improved nicely and was discharged home on 2018 to complete a  course of 11 days intravenous Invanz after midline was placed. FINAL DIAGNOSES:  1.   Complicated urinary tract infection with

## 2018-11-30 LAB
EKG ATRIAL RATE: 82 BPM
EKG P AXIS: 16 DEGREES
EKG P-R INTERVAL: 158 MS
EKG Q-T INTERVAL: 380 MS
EKG QRS DURATION: 68 MS
EKG QTC CALCULATION (BAZETT): 443 MS
EKG R AXIS: -18 DEGREES
EKG T AXIS: 18 DEGREES
EKG VENTRICULAR RATE: 82 BPM

## 2018-12-01 ENCOUNTER — HOSPITAL ENCOUNTER (OUTPATIENT)
Age: 83
Discharge: HOME | End: 2018-12-03
Payer: MEDICARE

## 2018-12-01 PROCEDURE — 87324 CLOSTRIDIUM AG IA: CPT

## 2018-12-02 LAB — C DIFFICILE TOXIN, EIA: NORMAL

## 2018-12-03 ENCOUNTER — NURSE ONLY (OUTPATIENT)
Dept: NON INVASIVE DIAGNOSTICS | Age: 83
End: 2018-12-03

## 2018-12-03 DIAGNOSIS — Z95.0 PACEMAKER: Primary | Chronic | ICD-10-CM

## 2019-01-28 ENCOUNTER — HOSPITAL ENCOUNTER (INPATIENT)
Age: 84
LOS: 1 days | Discharge: HOME | DRG: 310 | End: 2019-01-29
Attending: EMERGENCY MEDICINE | Admitting: INTERNAL MEDICINE
Payer: MEDICARE

## 2019-01-28 ENCOUNTER — APPOINTMENT (OUTPATIENT)
Dept: GENERAL RADIOLOGY | Age: 84
DRG: 310 | End: 2019-01-28
Payer: MEDICARE

## 2019-01-28 DIAGNOSIS — I48.91 ATRIAL FIBRILLATION WITH RAPID VENTRICULAR RESPONSE (HCC): Primary | ICD-10-CM

## 2019-01-28 PROBLEM — Z16.12 UTI DUE TO EXTENDED-SPECTRUM BETA LACTAMASE (ESBL) PRODUCING ESCHERICHIA COLI: Status: RESOLVED | Noted: 2018-11-24 | Resolved: 2019-01-28

## 2019-01-28 PROBLEM — E87.6 HYPOKALEMIA: Status: RESOLVED | Noted: 2018-11-27 | Resolved: 2019-01-28

## 2019-01-28 PROBLEM — B96.29 UTI DUE TO EXTENDED-SPECTRUM BETA LACTAMASE (ESBL) PRODUCING ESCHERICHIA COLI: Status: RESOLVED | Noted: 2018-11-24 | Resolved: 2019-01-28

## 2019-01-28 PROBLEM — I10 ESSENTIAL HYPERTENSION: Chronic | Status: ACTIVE | Noted: 2019-01-28

## 2019-01-28 PROBLEM — N39.0 COMPLICATED UTI (URINARY TRACT INFECTION): Status: RESOLVED | Noted: 2017-10-08 | Resolved: 2019-01-28

## 2019-01-28 PROBLEM — N39.0 COMPLICATED URINARY TRACT INFECTION: Status: RESOLVED | Noted: 2018-11-24 | Resolved: 2019-01-28

## 2019-01-28 PROBLEM — K52.9 ACUTE GASTROENTERITIS: Status: RESOLVED | Noted: 2018-11-27 | Resolved: 2019-01-28

## 2019-01-28 PROBLEM — N30.90 CYSTITIS: Status: RESOLVED | Noted: 2018-07-24 | Resolved: 2019-01-28

## 2019-01-28 PROBLEM — N39.0 UTI DUE TO EXTENDED-SPECTRUM BETA LACTAMASE (ESBL) PRODUCING ESCHERICHIA COLI: Status: RESOLVED | Noted: 2018-11-24 | Resolved: 2019-01-28

## 2019-01-28 LAB
ALBUMIN SERPL-MCNC: 4.5 G/DL (ref 3.5–5.2)
ALP BLD-CCNC: 129 U/L (ref 35–104)
ALT SERPL-CCNC: 19 U/L (ref 0–32)
ANION GAP SERPL CALCULATED.3IONS-SCNC: 15 MMOL/L (ref 7–16)
AST SERPL-CCNC: 31 U/L (ref 0–31)
BASOPHILS ABSOLUTE: 0.06 E9/L (ref 0–0.2)
BASOPHILS RELATIVE PERCENT: 0.6 % (ref 0–2)
BILIRUB SERPL-MCNC: 0.7 MG/DL (ref 0–1.2)
BUN BLDV-MCNC: 20 MG/DL (ref 8–23)
CALCIUM SERPL-MCNC: 10 MG/DL (ref 8.6–10.2)
CHLORIDE BLD-SCNC: 98 MMOL/L (ref 98–107)
CO2: 24 MMOL/L (ref 22–29)
CREAT SERPL-MCNC: 0.8 MG/DL (ref 0.5–1)
EKG ATRIAL RATE: 147 BPM
EKG Q-T INTERVAL: 332 MS
EKG QRS DURATION: 62 MS
EKG QTC CALCULATION (BAZETT): 521 MS
EKG R AXIS: 19 DEGREES
EKG T AXIS: -78 DEGREES
EKG VENTRICULAR RATE: 148 BPM
EOSINOPHILS ABSOLUTE: 0.26 E9/L (ref 0.05–0.5)
EOSINOPHILS RELATIVE PERCENT: 2.5 % (ref 0–6)
GFR AFRICAN AMERICAN: >60
GFR NON-AFRICAN AMERICAN: >60 ML/MIN/1.73
GLUCOSE BLD-MCNC: 118 MG/DL (ref 74–99)
HCT VFR BLD CALC: 46.7 % (ref 34–48)
HEMOGLOBIN: 14.9 G/DL (ref 11.5–15.5)
IMMATURE GRANULOCYTES #: 0.04 E9/L
IMMATURE GRANULOCYTES %: 0.4 % (ref 0–5)
LYMPHOCYTES ABSOLUTE: 2.55 E9/L (ref 1.5–4)
LYMPHOCYTES RELATIVE PERCENT: 24.4 % (ref 20–42)
MAGNESIUM: 1.8 MG/DL (ref 1.6–2.6)
MCH RBC QN AUTO: 27 PG (ref 26–35)
MCHC RBC AUTO-ENTMCNC: 31.9 % (ref 32–34.5)
MCV RBC AUTO: 84.8 FL (ref 80–99.9)
MONOCYTES ABSOLUTE: 1.18 E9/L (ref 0.1–0.95)
MONOCYTES RELATIVE PERCENT: 11.3 % (ref 2–12)
NEUTROPHILS ABSOLUTE: 6.38 E9/L (ref 1.8–7.3)
NEUTROPHILS RELATIVE PERCENT: 60.8 % (ref 43–80)
PDW BLD-RTO: 14.3 FL (ref 11.5–15)
PLATELET # BLD: 268 E9/L (ref 130–450)
PMV BLD AUTO: 10.4 FL (ref 7–12)
POTASSIUM SERPL-SCNC: 3.7 MMOL/L (ref 3.5–5)
PRO-BNP: 216 PG/ML (ref 0–450)
RBC # BLD: 5.51 E12/L (ref 3.5–5.5)
SODIUM BLD-SCNC: 137 MMOL/L (ref 132–146)
T4 FREE: 1.55 NG/DL (ref 0.93–1.7)
TOTAL PROTEIN: 8.2 G/DL (ref 6.4–8.3)
TROPONIN: <0.01 NG/ML (ref 0–0.03)
TROPONIN: <0.01 NG/ML (ref 0–0.03)
TSH SERPL DL<=0.05 MIU/L-ACNC: 2.68 UIU/ML (ref 0.27–4.2)
WBC # BLD: 10.5 E9/L (ref 4.5–11.5)

## 2019-01-28 PROCEDURE — 36415 COLL VENOUS BLD VENIPUNCTURE: CPT

## 2019-01-28 PROCEDURE — G0378 HOSPITAL OBSERVATION PER HR: HCPCS

## 2019-01-28 PROCEDURE — 83735 ASSAY OF MAGNESIUM: CPT

## 2019-01-28 PROCEDURE — 2580000003 HC RX 258: Performed by: INTERNAL MEDICINE

## 2019-01-28 PROCEDURE — 2060000000 HC ICU INTERMEDIATE R&B

## 2019-01-28 PROCEDURE — 84439 ASSAY OF FREE THYROXINE: CPT

## 2019-01-28 PROCEDURE — 93005 ELECTROCARDIOGRAM TRACING: CPT | Performed by: EMERGENCY MEDICINE

## 2019-01-28 PROCEDURE — 85025 COMPLETE CBC W/AUTO DIFF WBC: CPT

## 2019-01-28 PROCEDURE — 83880 ASSAY OF NATRIURETIC PEPTIDE: CPT

## 2019-01-28 PROCEDURE — 6370000000 HC RX 637 (ALT 250 FOR IP): Performed by: INTERNAL MEDICINE

## 2019-01-28 PROCEDURE — 84443 ASSAY THYROID STIM HORMONE: CPT

## 2019-01-28 PROCEDURE — 71045 X-RAY EXAM CHEST 1 VIEW: CPT

## 2019-01-28 PROCEDURE — 6370000000 HC RX 637 (ALT 250 FOR IP): Performed by: EMERGENCY MEDICINE

## 2019-01-28 PROCEDURE — 84484 ASSAY OF TROPONIN QUANT: CPT

## 2019-01-28 PROCEDURE — 2580000003 HC RX 258: Performed by: EMERGENCY MEDICINE

## 2019-01-28 PROCEDURE — 99285 EMERGENCY DEPT VISIT HI MDM: CPT

## 2019-01-28 PROCEDURE — 80053 COMPREHEN METABOLIC PANEL: CPT

## 2019-01-28 RX ORDER — ACETAMINOPHEN 325 MG/1
650 TABLET ORAL EVERY 4 HOURS PRN
Status: DISCONTINUED | OUTPATIENT
Start: 2019-01-28 | End: 2019-01-29 | Stop reason: HOSPADM

## 2019-01-28 RX ORDER — SODIUM CHLORIDE 0.9 % (FLUSH) 0.9 %
10 SYRINGE (ML) INJECTION PRN
Status: DISCONTINUED | OUTPATIENT
Start: 2019-01-28 | End: 2019-01-29 | Stop reason: HOSPADM

## 2019-01-28 RX ORDER — CALCIUM CARBONATE 200(500)MG
500 TABLET,CHEWABLE ORAL 3 TIMES DAILY PRN
Status: DISCONTINUED | OUTPATIENT
Start: 2019-01-28 | End: 2019-01-29 | Stop reason: HOSPADM

## 2019-01-28 RX ORDER — 0.9 % SODIUM CHLORIDE 0.9 %
500 INTRAVENOUS SOLUTION INTRAVENOUS ONCE
Status: COMPLETED | OUTPATIENT
Start: 2019-01-28 | End: 2019-01-28

## 2019-01-28 RX ORDER — SODIUM CHLORIDE 0.9 % (FLUSH) 0.9 %
10 SYRINGE (ML) INJECTION EVERY 12 HOURS SCHEDULED
Status: DISCONTINUED | OUTPATIENT
Start: 2019-01-28 | End: 2019-01-29 | Stop reason: HOSPADM

## 2019-01-28 RX ORDER — ASPIRIN 81 MG/1
324 TABLET, CHEWABLE ORAL ONCE
Status: COMPLETED | OUTPATIENT
Start: 2019-01-28 | End: 2019-01-28

## 2019-01-28 RX ORDER — METOPROLOL TARTRATE 50 MG/1
50 TABLET, FILM COATED ORAL 2 TIMES DAILY
Status: DISCONTINUED | OUTPATIENT
Start: 2019-01-28 | End: 2019-01-29 | Stop reason: HOSPADM

## 2019-01-28 RX ORDER — ADENOSINE 3 MG/ML
INJECTION, SOLUTION INTRAVENOUS
Status: DISPENSED
Start: 2019-01-28 | End: 2019-01-29

## 2019-01-28 RX ORDER — PANTOPRAZOLE SODIUM 40 MG/1
40 TABLET, DELAYED RELEASE ORAL
Status: DISCONTINUED | OUTPATIENT
Start: 2019-01-29 | End: 2019-01-29 | Stop reason: HOSPADM

## 2019-01-28 RX ORDER — TRIAMTERENE AND HYDROCHLOROTHIAZIDE 37.5; 25 MG/1; MG/1
1 TABLET ORAL EVERY MORNING
Status: DISCONTINUED | OUTPATIENT
Start: 2019-01-29 | End: 2019-01-29 | Stop reason: HOSPADM

## 2019-01-28 RX ORDER — ONDANSETRON 2 MG/ML
4 INJECTION INTRAMUSCULAR; INTRAVENOUS EVERY 6 HOURS PRN
Status: DISCONTINUED | OUTPATIENT
Start: 2019-01-28 | End: 2019-01-29 | Stop reason: HOSPADM

## 2019-01-28 RX ADMIN — ASPIRIN 81 MG 324 MG: 81 TABLET ORAL at 19:09

## 2019-01-28 RX ADMIN — METOPROLOL TARTRATE 50 MG: 50 TABLET ORAL at 22:09

## 2019-01-28 RX ADMIN — APIXABAN 5 MG: 5 TABLET, FILM COATED ORAL at 22:08

## 2019-01-28 RX ADMIN — SODIUM CHLORIDE 500 ML: 9 INJECTION, SOLUTION INTRAVENOUS at 18:05

## 2019-01-28 RX ADMIN — Medication 10 ML: at 22:10

## 2019-01-28 ASSESSMENT — ENCOUNTER SYMPTOMS
WHEEZING: 0
BACK PAIN: 0
VOMITING: 0
SHORTNESS OF BREATH: 0
DIARRHEA: 0
BLOOD IN STOOL: 0
COUGH: 0
NAUSEA: 0
ABDOMINAL PAIN: 0
CONSTIPATION: 0

## 2019-01-28 ASSESSMENT — PAIN SCALES - GENERAL: PAINLEVEL_OUTOF10: 0

## 2019-01-29 VITALS
TEMPERATURE: 97.4 F | DIASTOLIC BLOOD PRESSURE: 64 MMHG | HEART RATE: 60 BPM | OXYGEN SATURATION: 98 % | WEIGHT: 149 LBS | BODY MASS INDEX: 28.13 KG/M2 | HEIGHT: 61 IN | RESPIRATION RATE: 18 BRPM | SYSTOLIC BLOOD PRESSURE: 133 MMHG

## 2019-01-29 PROBLEM — I48.91 ATRIAL FIBRILLATION (HCC): Status: ACTIVE | Noted: 2019-01-29

## 2019-01-29 PROBLEM — I48.91 ATRIAL FIBRILLATION WITH RAPID VENTRICULAR RESPONSE (HCC): Status: RESOLVED | Noted: 2019-01-28 | Resolved: 2019-01-29

## 2019-01-29 LAB
ALBUMIN SERPL-MCNC: 3.6 G/DL (ref 3.5–5.2)
ALP BLD-CCNC: 101 U/L (ref 35–104)
ALT SERPL-CCNC: 14 U/L (ref 0–32)
ANION GAP SERPL CALCULATED.3IONS-SCNC: 12 MMOL/L (ref 7–16)
AST SERPL-CCNC: 22 U/L (ref 0–31)
BASOPHILS ABSOLUTE: 0.05 E9/L (ref 0–0.2)
BASOPHILS RELATIVE PERCENT: 0.7 % (ref 0–2)
BILIRUB SERPL-MCNC: 0.6 MG/DL (ref 0–1.2)
BUN BLDV-MCNC: 21 MG/DL (ref 8–23)
CALCIUM SERPL-MCNC: 9 MG/DL (ref 8.6–10.2)
CHLORIDE BLD-SCNC: 104 MMOL/L (ref 98–107)
CO2: 22 MMOL/L (ref 22–29)
CREAT SERPL-MCNC: 0.8 MG/DL (ref 0.5–1)
EOSINOPHILS ABSOLUTE: 0.31 E9/L (ref 0.05–0.5)
EOSINOPHILS RELATIVE PERCENT: 4.6 % (ref 0–6)
GFR AFRICAN AMERICAN: >60
GFR NON-AFRICAN AMERICAN: >60 ML/MIN/1.73
GLUCOSE BLD-MCNC: 103 MG/DL (ref 74–99)
HCT VFR BLD CALC: 37.4 % (ref 34–48)
HEMOGLOBIN: 12.2 G/DL (ref 11.5–15.5)
IMMATURE GRANULOCYTES #: 0.02 E9/L
IMMATURE GRANULOCYTES %: 0.3 % (ref 0–5)
LV EF: 60 %
LVEF MODALITY: NORMAL
LYMPHOCYTES ABSOLUTE: 1.8 E9/L (ref 1.5–4)
LYMPHOCYTES RELATIVE PERCENT: 26.5 % (ref 20–42)
MAGNESIUM: 1.7 MG/DL (ref 1.6–2.6)
MCH RBC QN AUTO: 27.3 PG (ref 26–35)
MCHC RBC AUTO-ENTMCNC: 32.6 % (ref 32–34.5)
MCV RBC AUTO: 83.7 FL (ref 80–99.9)
MONOCYTES ABSOLUTE: 0.95 E9/L (ref 0.1–0.95)
MONOCYTES RELATIVE PERCENT: 14 % (ref 2–12)
NEUTROPHILS ABSOLUTE: 3.66 E9/L (ref 1.8–7.3)
NEUTROPHILS RELATIVE PERCENT: 53.9 % (ref 43–80)
PDW BLD-RTO: 14.2 FL (ref 11.5–15)
PLATELET # BLD: 224 E9/L (ref 130–450)
PMV BLD AUTO: 10.5 FL (ref 7–12)
POTASSIUM SERPL-SCNC: 3.7 MMOL/L (ref 3.5–5)
RBC # BLD: 4.47 E12/L (ref 3.5–5.5)
SODIUM BLD-SCNC: 138 MMOL/L (ref 132–146)
TOTAL PROTEIN: 6.4 G/DL (ref 6.4–8.3)
TROPONIN: <0.01 NG/ML (ref 0–0.03)
WBC # BLD: 6.8 E9/L (ref 4.5–11.5)

## 2019-01-29 PROCEDURE — 6370000000 HC RX 637 (ALT 250 FOR IP): Performed by: INTERNAL MEDICINE

## 2019-01-29 PROCEDURE — 93005 ELECTROCARDIOGRAM TRACING: CPT | Performed by: NURSE PRACTITIONER

## 2019-01-29 PROCEDURE — 85025 COMPLETE CBC W/AUTO DIFF WBC: CPT

## 2019-01-29 PROCEDURE — APPSS60 APP SPLIT SHARED TIME 46-60 MINUTES: Performed by: NURSE PRACTITIONER

## 2019-01-29 PROCEDURE — 80053 COMPREHEN METABOLIC PANEL: CPT

## 2019-01-29 PROCEDURE — 99222 1ST HOSP IP/OBS MODERATE 55: CPT | Performed by: INTERNAL MEDICINE

## 2019-01-29 PROCEDURE — 36415 COLL VENOUS BLD VENIPUNCTURE: CPT

## 2019-01-29 PROCEDURE — 83735 ASSAY OF MAGNESIUM: CPT

## 2019-01-29 PROCEDURE — G0378 HOSPITAL OBSERVATION PER HR: HCPCS

## 2019-01-29 PROCEDURE — 97161 PT EVAL LOW COMPLEX 20 MIN: CPT

## 2019-01-29 PROCEDURE — 97165 OT EVAL LOW COMPLEX 30 MIN: CPT

## 2019-01-29 PROCEDURE — 93306 TTE W/DOPPLER COMPLETE: CPT

## 2019-01-29 PROCEDURE — 84484 ASSAY OF TROPONIN QUANT: CPT

## 2019-01-29 PROCEDURE — 2580000003 HC RX 258: Performed by: INTERNAL MEDICINE

## 2019-01-29 RX ORDER — METOPROLOL TARTRATE 50 MG/1
50 TABLET, FILM COATED ORAL 2 TIMES DAILY
Qty: 60 TABLET | Refills: 0 | Status: SHIPPED | OUTPATIENT
Start: 2019-01-29 | End: 2019-02-27 | Stop reason: ALTCHOICE

## 2019-01-29 RX ORDER — ASPIRIN 81 MG/1
81 TABLET ORAL DAILY
Qty: 30 TABLET | Refills: 0 | Status: ON HOLD | OUTPATIENT
Start: 2019-01-30 | End: 2019-10-17 | Stop reason: HOSPADM

## 2019-01-29 RX ORDER — ASPIRIN 81 MG/1
81 TABLET ORAL DAILY
Status: DISCONTINUED | OUTPATIENT
Start: 2019-01-30 | End: 2019-01-29 | Stop reason: HOSPADM

## 2019-01-29 RX ADMIN — ACETAMINOPHEN 650 MG: 325 TABLET ORAL at 12:22

## 2019-01-29 RX ADMIN — PANTOPRAZOLE SODIUM 40 MG: 40 TABLET, DELAYED RELEASE ORAL at 05:38

## 2019-01-29 RX ADMIN — TRIAMTERENE AND HYDROCHLOROTHIAZIDE 1 TABLET: 37.5; 25 TABLET ORAL at 09:22

## 2019-01-29 RX ADMIN — METOPROLOL TARTRATE 50 MG: 50 TABLET ORAL at 20:22

## 2019-01-29 RX ADMIN — APIXABAN 5 MG: 5 TABLET, FILM COATED ORAL at 09:22

## 2019-01-29 RX ADMIN — Medication 10 ML: at 09:22

## 2019-01-29 RX ADMIN — METOPROLOL TARTRATE 50 MG: 50 TABLET ORAL at 09:22

## 2019-01-29 ASSESSMENT — PAIN SCALES - GENERAL
PAINLEVEL_OUTOF10: 0
PAINLEVEL_OUTOF10: 4

## 2019-01-30 LAB
EKG ATRIAL RATE: 60 BPM
EKG P-R INTERVAL: 194 MS
EKG Q-T INTERVAL: 432 MS
EKG QRS DURATION: 68 MS
EKG QTC CALCULATION (BAZETT): 432 MS
EKG R AXIS: -9 DEGREES
EKG T AXIS: 21 DEGREES
EKG VENTRICULAR RATE: 60 BPM

## 2019-01-30 PROCEDURE — 93010 ELECTROCARDIOGRAM REPORT: CPT | Performed by: INTERNAL MEDICINE

## 2019-02-27 ENCOUNTER — OFFICE VISIT (OUTPATIENT)
Dept: NON INVASIVE DIAGNOSTICS | Age: 84
End: 2019-02-27
Payer: MEDICARE

## 2019-02-27 VITALS
DIASTOLIC BLOOD PRESSURE: 80 MMHG | HEART RATE: 102 BPM | BODY MASS INDEX: 28.51 KG/M2 | SYSTOLIC BLOOD PRESSURE: 136 MMHG | HEIGHT: 61 IN | WEIGHT: 151 LBS | RESPIRATION RATE: 16 BRPM

## 2019-02-27 DIAGNOSIS — Z95.0 PACEMAKER: Primary | Chronic | ICD-10-CM

## 2019-02-27 PROCEDURE — G8400 PT W/DXA NO RESULTS DOC: HCPCS | Performed by: NURSE PRACTITIONER

## 2019-02-27 PROCEDURE — 1101F PT FALLS ASSESS-DOCD LE1/YR: CPT | Performed by: NURSE PRACTITIONER

## 2019-02-27 PROCEDURE — 1090F PRES/ABSN URINE INCON ASSESS: CPT | Performed by: NURSE PRACTITIONER

## 2019-02-27 PROCEDURE — 99214 OFFICE O/P EST MOD 30 MIN: CPT | Performed by: NURSE PRACTITIONER

## 2019-02-27 PROCEDURE — 4040F PNEUMOC VAC/ADMIN/RCVD: CPT | Performed by: NURSE PRACTITIONER

## 2019-02-27 PROCEDURE — G8484 FLU IMMUNIZE NO ADMIN: HCPCS | Performed by: NURSE PRACTITIONER

## 2019-02-27 PROCEDURE — 1036F TOBACCO NON-USER: CPT | Performed by: NURSE PRACTITIONER

## 2019-02-27 PROCEDURE — 93280 PM DEVICE PROGR EVAL DUAL: CPT | Performed by: NURSE PRACTITIONER

## 2019-02-27 PROCEDURE — G8419 CALC BMI OUT NRM PARAM NOF/U: HCPCS | Performed by: NURSE PRACTITIONER

## 2019-02-27 PROCEDURE — 1111F DSCHRG MED/CURRENT MED MERGE: CPT | Performed by: NURSE PRACTITIONER

## 2019-02-27 PROCEDURE — 1123F ACP DISCUSS/DSCN MKR DOCD: CPT | Performed by: NURSE PRACTITIONER

## 2019-02-27 PROCEDURE — G8427 DOCREV CUR MEDS BY ELIG CLIN: HCPCS | Performed by: NURSE PRACTITIONER

## 2019-02-27 RX ORDER — IRBESARTAN 150 MG/1
150 TABLET ORAL EVERY EVENING
Refills: 1 | COMMUNITY
Start: 2019-01-31

## 2019-02-27 RX ORDER — OMEPRAZOLE 10 MG/1
10 CAPSULE, DELAYED RELEASE ORAL PRN
Status: ON HOLD | COMMUNITY
End: 2019-05-13 | Stop reason: HOSPADM

## 2019-02-27 RX ORDER — METOPROLOL SUCCINATE 25 MG/1
25 TABLET, EXTENDED RELEASE ORAL DAILY
COMMUNITY
End: 2019-02-27 | Stop reason: SDUPTHER

## 2019-02-27 RX ORDER — METOPROLOL SUCCINATE 25 MG/1
25 TABLET, EXTENDED RELEASE ORAL DAILY
Qty: 30 TABLET | Refills: 3 | Status: ON HOLD | OUTPATIENT
Start: 2019-02-27 | End: 2019-05-13 | Stop reason: HOSPADM

## 2019-02-27 RX ORDER — ACETAMINOPHEN, ASPIRIN AND CAFFEINE 250; 250; 65 MG/1; MG/1; MG/1
1 TABLET, FILM COATED ORAL EVERY 6 HOURS PRN
Status: ON HOLD | COMMUNITY
End: 2019-10-17 | Stop reason: HOSPADM

## 2019-02-27 ASSESSMENT — ENCOUNTER SYMPTOMS
SHORTNESS OF BREATH: 1
WHEEZING: 0
COUGH: 0
CHEST TIGHTNESS: 0

## 2019-03-04 ENCOUNTER — NURSE ONLY (OUTPATIENT)
Dept: NON INVASIVE DIAGNOSTICS | Age: 84
End: 2019-03-04
Payer: MEDICARE

## 2019-03-04 DIAGNOSIS — Z95.0 CARDIAC PACEMAKER IN SITU: Primary | ICD-10-CM

## 2019-03-04 PROCEDURE — 93294 REM INTERROG EVL PM/LDLS PM: CPT | Performed by: INTERNAL MEDICINE

## 2019-03-04 PROCEDURE — 93296 REM INTERROG EVL PM/IDS: CPT | Performed by: INTERNAL MEDICINE

## 2019-04-27 ENCOUNTER — HOSPITAL ENCOUNTER (EMERGENCY)
Age: 84
Discharge: HOME | End: 2019-04-27
Attending: FAMILY MEDICINE
Payer: MEDICARE

## 2019-04-27 VITALS
WEIGHT: 150 LBS | HEIGHT: 61 IN | SYSTOLIC BLOOD PRESSURE: 142 MMHG | DIASTOLIC BLOOD PRESSURE: 90 MMHG | BODY MASS INDEX: 28.32 KG/M2 | OXYGEN SATURATION: 97 % | HEART RATE: 96 BPM | RESPIRATION RATE: 16 BRPM | TEMPERATURE: 97.6 F

## 2019-04-27 DIAGNOSIS — N39.0 URINARY TRACT INFECTION WITH HEMATURIA, SITE UNSPECIFIED: Primary | ICD-10-CM

## 2019-04-27 DIAGNOSIS — R31.9 URINARY TRACT INFECTION WITH HEMATURIA, SITE UNSPECIFIED: Primary | ICD-10-CM

## 2019-04-27 LAB
BACTERIA: ABNORMAL /HPF
BILIRUBIN URINE: NEGATIVE
BLOOD, URINE: ABNORMAL
CLARITY: ABNORMAL
COLOR: ABNORMAL
EPITHELIAL CELLS, UA: ABNORMAL /HPF
GLUCOSE URINE: NEGATIVE MG/DL
KETONES, URINE: NEGATIVE MG/DL
LEUKOCYTE ESTERASE, URINE: ABNORMAL
NITRITE, URINE: POSITIVE
PH UA: 6.5 (ref 5–9)
PROTEIN UA: 30 MG/DL
RBC UA: >20 /HPF (ref 0–2)
SPECIFIC GRAVITY UA: 1.01 (ref 1–1.03)
UROBILINOGEN, URINE: 0.2 E.U./DL
WBC UA: >20 /HPF (ref 0–5)

## 2019-04-27 PROCEDURE — 6370000000 HC RX 637 (ALT 250 FOR IP): Performed by: FAMILY MEDICINE

## 2019-04-27 PROCEDURE — 87186 SC STD MICRODIL/AGAR DIL: CPT

## 2019-04-27 PROCEDURE — 81001 URINALYSIS AUTO W/SCOPE: CPT

## 2019-04-27 PROCEDURE — 87088 URINE BACTERIA CULTURE: CPT

## 2019-04-27 PROCEDURE — 99283 EMERGENCY DEPT VISIT LOW MDM: CPT

## 2019-04-27 RX ORDER — SULFAMETHOXAZOLE AND TRIMETHOPRIM 800; 160 MG/1; MG/1
1 TABLET ORAL ONCE
Status: COMPLETED | OUTPATIENT
Start: 2019-04-27 | End: 2019-04-27

## 2019-04-27 RX ORDER — SULFAMETHOXAZOLE AND TRIMETHOPRIM 800; 160 MG/1; MG/1
1 TABLET ORAL 2 TIMES DAILY
Qty: 14 TABLET | Refills: 0 | Status: SHIPPED | OUTPATIENT
Start: 2019-04-27 | End: 2019-05-04

## 2019-04-27 RX ORDER — AMOXICILLIN AND CLAVULANATE POTASSIUM 875; 125 MG/1; MG/1
TABLET, FILM COATED ORAL
Status: DISCONTINUED
Start: 2019-04-27 | End: 2019-04-27 | Stop reason: HOSPADM

## 2019-04-27 RX ADMIN — SULFAMETHOXAZOLE AND TRIMETHOPRIM 1 TABLET: 800; 160 TABLET ORAL at 06:18

## 2019-04-27 ASSESSMENT — PAIN SCALES - GENERAL: PAINLEVEL_OUTOF10: 6

## 2019-04-27 ASSESSMENT — PAIN DESCRIPTION - DESCRIPTORS: DESCRIPTORS: BURNING

## 2019-04-27 ASSESSMENT — PAIN DESCRIPTION - LOCATION: LOCATION: PERINEUM

## 2019-04-27 NOTE — ED NOTES
Instructions provided and questions answered. Prescriptions verified with patient.       Brandi Benavidez, RN  04/27/19 9151

## 2019-04-27 NOTE — ED PROVIDER NOTES
Department of Emergency Medicine   ED  Provider Note  Admit Date/RoomTime: 4/27/2019  5:54 AM  ED Room: 11/11  Chief Complaint:   Urinary Tract Infection    History of Present Illness   Source of history provided by:  patient. History/Exam Limitations: none. Lisa Cain is a 80 y.o. old female who has a past medical history of:   Past Medical History:   Diagnosis Date    Acute gastroenteritis 11/27/2018    Arthritis     Asthma     AVB (atrioventricular block) 6/20/2012    Cancer (HCC)     colon    Chest pain 7/11/2013    Chronic kidney disease, stage 3 ([facility])     begining of stage 3    Complicated UTI (urinary tract infection) 10/8/2017    COPD [facility]ch[facility] obstructive pulmonary disease) ([facility])     COPD (chronic obstructive pulmonary disease) ([facility] 1/13/2017    GERD (gastroesophageal reflux disease)     Hypertension     uncontrolled BP    Hypokalemia 11/27/2018    Symptomatic bradycardia     presents to the emergency department by private vehicle and ambulatory, for burning with urination, dysuria, urinary frequency, which occured 1 day(s) prior to arrival.  Since onset the symptoms have been persistent and moderate in severity. Symptoms are associated with vomiting. She has a history of recurrent UTI. ROS   Pertinent positives and negatives are stated within HPI, all other systems reviewed and are negative. Past Surgical History:   Procedure Laterality Date    APPENDECTOMY      BACK SURGERY      CARDIAC CATHETERIZATION  02/10/2016    Dr. Amauri Mar single vessel disease    CHOLECYSTECTOMY      COLONOSCOPY      ECHO COMPLETE  6/20/2012         ENDOSCOPY, COLON, DIAGNOSTIC      HYSTERECTOMY      JOINT REPLACEMENT      left hip    KYPHOSIS SURGERY      PACEMAKER PLACEMENT  6-    [facility] dual chamber    TONSILLECTOMY     Social History:  reports that she quit smoking about 36 years ago. Her smoking use included cigarettes. She started smoking about 53 years ago.  She has a 17.00 pack-year smoking history. She has never used smokeless tobacco. She reports that she does not drink alcohol or use drugs. Family History: family history includes Asthma in her father; Heart Attack in her mother; Heart Disease in her mother; Other in her father. Allergies: Codeine; Amlodipine; Augmentin [amoxicillin-pot clavulanate]; Bactrim; Benadryl [diphenhydramine]; Brovana [arformoterol]; Cardizem [diltiazem hcl]; Doxazosin; Ipratropium; Macrodantin [nitrofurantoin macrocrystal]; and Verapamil    Physical Exam            ED Triage Vitals   BP Temp Temp src Pulse Resp SpO2 Height Weight   -- -- -- -- -- -- -- --      Oxygen Saturation Interpretation: Normal.    · Constitutional:  Alert, development consistent with age. · HEENT:  NC/NT. Airway patent. · Neck:  Normal ROM. Supple. · Respiratory:  Lungs Clear to auscultation and breath sounds equal.  · CV:  Regular rate and rhythm, normal heart sounds, without pathological murmurs, ectopy, gallops, or rubs. · GI:  General Appearance: normal.         Bowel sounds: normal bowel sounds. Distension:  None. Tenderness: No abdominal tenderness. Liver: non-tender. Spleen:  non-tender. Pulsatile Mass: absent. ·  : (chaperone present during examination). not indicated / deferred. · Back: CVA Tenderness: No.  · Integument:  Normal turgor. Warm, dry, without visible rash, unless noted elsewhere. Lymphatics: No lymphangitis or adenopathy noted. · Neurological:  Oriented. Motor functions intact.     Lab / Imaging Results   (All laboratory and radiology results have been personally reviewed by myself)  Labs:  Results for orders placed or performed during the hospital encounter of 04/27/19   Urinalysis   Result Value Ref Range    Color, UA Straw Straw/Yellow    Clarity, UA CLOUDY (A) Clear    Glucose, Ur Negative Negative mg/dL    Bilirubin Urine Negative Negative    Ketones, Urine MD  04/27/19 0636

## 2019-04-29 LAB
ORGANISM: ABNORMAL
URINE CULTURE, ROUTINE: ABNORMAL
URINE CULTURE, ROUTINE: ABNORMAL

## 2019-05-10 ENCOUNTER — APPOINTMENT (OUTPATIENT)
Dept: CT IMAGING | Age: 84
DRG: 690 | End: 2019-05-10
Payer: MEDICARE

## 2019-05-10 ENCOUNTER — HOSPITAL ENCOUNTER (INPATIENT)
Age: 84
LOS: 3 days | Discharge: HOME | DRG: 690 | End: 2019-05-13
Attending: EMERGENCY MEDICINE | Admitting: INTERNAL MEDICINE
Payer: MEDICARE

## 2019-05-10 PROBLEM — A41.9 SEPSIS (HCC): Status: ACTIVE | Noted: 2019-05-10

## 2019-05-10 LAB
ALBUMIN SERPL-MCNC: 4.1 G/DL (ref 3.5–5.2)
ALP BLD-CCNC: 124 U/L (ref 35–104)
ALT SERPL-CCNC: 15 U/L (ref 0–32)
ANION GAP SERPL CALCULATED.3IONS-SCNC: 12 MMOL/L (ref 7–16)
AST SERPL-CCNC: 29 U/L (ref 0–31)
BACTERIA: ABNORMAL /HPF
BASOPHILS ABSOLUTE: 0.06 E9/L (ref 0–0.2)
BASOPHILS RELATIVE PERCENT: 0.3 % (ref 0–2)
BILIRUB SERPL-MCNC: 0.7 MG/DL (ref 0–1.2)
BILIRUBIN URINE: NEGATIVE
BLOOD, URINE: ABNORMAL
BUN BLDV-MCNC: 18 MG/DL (ref 8–23)
CALCIUM SERPL-MCNC: 9.6 MG/DL (ref 8.6–10.2)
CHLORIDE BLD-SCNC: 97 MMOL/L (ref 98–107)
CLARITY: ABNORMAL
CO2: 25 MMOL/L (ref 22–29)
COLOR: YELLOW
CREAT SERPL-MCNC: 0.9 MG/DL (ref 0.5–1)
EOSINOPHILS ABSOLUTE: 0.13 E9/L (ref 0.05–0.5)
EOSINOPHILS RELATIVE PERCENT: 0.6 % (ref 0–6)
GFR AFRICAN AMERICAN: >60
GFR NON-AFRICAN AMERICAN: 60 ML/MIN/1.73
GLUCOSE BLD-MCNC: 113 MG/DL (ref 74–99)
GLUCOSE URINE: NEGATIVE MG/DL
HCT VFR BLD CALC: 44.8 % (ref 34–48)
HEMOGLOBIN: 14.6 G/DL (ref 11.5–15.5)
IMMATURE GRANULOCYTES #: 0.13 E9/L
IMMATURE GRANULOCYTES %: 0.6 % (ref 0–5)
KETONES, URINE: NEGATIVE MG/DL
LACTIC ACID: 1.8 MMOL/L (ref 0.5–2.2)
LEUKOCYTE ESTERASE, URINE: ABNORMAL
LYMPHOCYTES ABSOLUTE: 1.42 E9/L (ref 1.5–4)
LYMPHOCYTES RELATIVE PERCENT: 6.6 % (ref 20–42)
MCH RBC QN AUTO: 27.3 PG (ref 26–35)
MCHC RBC AUTO-ENTMCNC: 32.6 % (ref 32–34.5)
MCV RBC AUTO: 83.9 FL (ref 80–99.9)
MONOCYTES ABSOLUTE: 1.53 E9/L (ref 0.1–0.95)
MONOCYTES RELATIVE PERCENT: 7.1 % (ref 2–12)
NEUTROPHILS ABSOLUTE: 18.38 E9/L (ref 1.8–7.3)
NEUTROPHILS RELATIVE PERCENT: 84.8 % (ref 43–80)
NITRITE, URINE: POSITIVE
PDW BLD-RTO: 13.5 FL (ref 11.5–15)
PH UA: 7 (ref 5–9)
PLATELET # BLD: 272 E9/L (ref 130–450)
PMV BLD AUTO: 10.1 FL (ref 7–12)
POTASSIUM REFLEX MAGNESIUM: 3.8 MMOL/L (ref 3.5–5)
PROTEIN UA: 100 MG/DL
RBC # BLD: 5.34 E12/L (ref 3.5–5.5)
RBC UA: >20 /HPF (ref 0–2)
SODIUM BLD-SCNC: 134 MMOL/L (ref 132–146)
SPECIFIC GRAVITY UA: 1.02 (ref 1–1.03)
TOTAL PROTEIN: 7.6 G/DL (ref 6.4–8.3)
UROBILINOGEN, URINE: 0.2 E.U./DL
WBC # BLD: 21.7 E9/L (ref 4.5–11.5)
WBC UA: >20 /HPF (ref 0–5)

## 2019-05-10 PROCEDURE — 99285 EMERGENCY DEPT VISIT HI MDM: CPT

## 2019-05-10 PROCEDURE — 2060000000 HC ICU INTERMEDIATE R&B

## 2019-05-10 PROCEDURE — 6360000002 HC RX W HCPCS: Performed by: NURSE PRACTITIONER

## 2019-05-10 PROCEDURE — 81001 URINALYSIS AUTO W/SCOPE: CPT

## 2019-05-10 PROCEDURE — 87186 SC STD MICRODIL/AGAR DIL: CPT

## 2019-05-10 PROCEDURE — 6360000002 HC RX W HCPCS: Performed by: EMERGENCY MEDICINE

## 2019-05-10 PROCEDURE — 6360000004 HC RX CONTRAST MEDICATION: Performed by: RADIOLOGY

## 2019-05-10 PROCEDURE — 96365 THER/PROPH/DIAG IV INF INIT: CPT

## 2019-05-10 PROCEDURE — 2580000003 HC RX 258: Performed by: EMERGENCY MEDICINE

## 2019-05-10 PROCEDURE — 36415 COLL VENOUS BLD VENIPUNCTURE: CPT

## 2019-05-10 PROCEDURE — 80053 COMPREHEN METABOLIC PANEL: CPT

## 2019-05-10 PROCEDURE — 74177 CT ABD & PELVIS W/CONTRAST: CPT

## 2019-05-10 PROCEDURE — 2580000003 HC RX 258: Performed by: NURSE PRACTITIONER

## 2019-05-10 PROCEDURE — 6360000002 HC RX W HCPCS: Performed by: INTERNAL MEDICINE

## 2019-05-10 PROCEDURE — 96375 TX/PRO/DX INJ NEW DRUG ADDON: CPT

## 2019-05-10 PROCEDURE — 87088 URINE BACTERIA CULTURE: CPT

## 2019-05-10 PROCEDURE — 83605 ASSAY OF LACTIC ACID: CPT

## 2019-05-10 PROCEDURE — 87040 BLOOD CULTURE FOR BACTERIA: CPT

## 2019-05-10 PROCEDURE — 85025 COMPLETE CBC W/AUTO DIFF WBC: CPT

## 2019-05-10 RX ORDER — IRBESARTAN 150 MG/1
150 TABLET ORAL DAILY
Status: DISCONTINUED | OUTPATIENT
Start: 2019-05-11 | End: 2019-05-13 | Stop reason: HOSPADM

## 2019-05-10 RX ORDER — MAGNESIUM HYDROXIDE/ALUMINUM HYDROXICE/SIMETHICONE 120; 1200; 1200 MG/30ML; MG/30ML; MG/30ML
30 SUSPENSION ORAL EVERY 6 HOURS PRN
Status: DISCONTINUED | OUTPATIENT
Start: 2019-05-10 | End: 2019-05-13 | Stop reason: HOSPADM

## 2019-05-10 RX ORDER — OMEPRAZOLE 10 MG/1
10 CAPSULE, DELAYED RELEASE ORAL DAILY
Status: DISCONTINUED | OUTPATIENT
Start: 2019-05-11 | End: 2019-05-10 | Stop reason: CLARIF

## 2019-05-10 RX ORDER — ASPIRIN 81 MG/1
81 TABLET ORAL DAILY
Status: DISCONTINUED | OUTPATIENT
Start: 2019-05-11 | End: 2019-05-13 | Stop reason: HOSPADM

## 2019-05-10 RX ORDER — ONDANSETRON 2 MG/ML
4 INJECTION INTRAMUSCULAR; INTRAVENOUS EVERY 6 HOURS PRN
Status: DISCONTINUED | OUTPATIENT
Start: 2019-05-10 | End: 2019-05-13 | Stop reason: HOSPADM

## 2019-05-10 RX ORDER — KETOROLAC TROMETHAMINE 30 MG/ML
15 INJECTION, SOLUTION INTRAMUSCULAR; INTRAVENOUS ONCE
Status: COMPLETED | OUTPATIENT
Start: 2019-05-10 | End: 2019-05-10

## 2019-05-10 RX ORDER — TRIAMTERENE AND HYDROCHLOROTHIAZIDE 37.5; 25 MG/1; MG/1
1 TABLET ORAL EVERY MORNING
Status: DISCONTINUED | OUTPATIENT
Start: 2019-05-11 | End: 2019-05-13 | Stop reason: HOSPADM

## 2019-05-10 RX ORDER — ACETAMINOPHEN 325 MG/1
650 TABLET ORAL EVERY 4 HOURS PRN
Status: DISCONTINUED | OUTPATIENT
Start: 2019-05-10 | End: 2019-05-13 | Stop reason: HOSPADM

## 2019-05-10 RX ORDER — 0.9 % SODIUM CHLORIDE 0.9 %
500 INTRAVENOUS SOLUTION INTRAVENOUS ONCE
Status: COMPLETED | OUTPATIENT
Start: 2019-05-10 | End: 2019-05-10

## 2019-05-10 RX ORDER — PANTOPRAZOLE SODIUM 40 MG/1
40 TABLET, DELAYED RELEASE ORAL
Status: DISCONTINUED | OUTPATIENT
Start: 2019-05-11 | End: 2019-05-13 | Stop reason: HOSPADM

## 2019-05-10 RX ORDER — METOPROLOL SUCCINATE 25 MG/1
25 TABLET, EXTENDED RELEASE ORAL DAILY
Status: DISCONTINUED | OUTPATIENT
Start: 2019-05-11 | End: 2019-05-12

## 2019-05-10 RX ADMIN — IOPAMIDOL 110 ML: 755 INJECTION, SOLUTION INTRAVENOUS at 19:52

## 2019-05-10 RX ADMIN — SODIUM CHLORIDE 500 ML: 9 INJECTION, SOLUTION INTRAVENOUS at 16:26

## 2019-05-10 RX ADMIN — GENTAMICIN SULFATE 240 MG: 40 INJECTION, SOLUTION INTRAMUSCULAR; INTRAVENOUS at 20:15

## 2019-05-10 RX ADMIN — ONDANSETRON HYDROCHLORIDE 4 MG: 2 SOLUTION INTRAMUSCULAR; INTRAVENOUS at 21:43

## 2019-05-10 RX ADMIN — KETOROLAC TROMETHAMINE 15 MG: 30 INJECTION, SOLUTION INTRAMUSCULAR at 18:42

## 2019-05-10 RX ADMIN — CEFTRIAXONE SODIUM 1 G: 1 INJECTION, POWDER, FOR SOLUTION INTRAMUSCULAR; INTRAVENOUS at 16:26

## 2019-05-10 ASSESSMENT — PAIN DESCRIPTION - DESCRIPTORS
DESCRIPTORS: BURNING
DESCRIPTORS: STABBING
DESCRIPTORS: BURNING;PRESSURE;SHARP

## 2019-05-10 ASSESSMENT — PAIN SCALES - GENERAL
PAINLEVEL_OUTOF10: 5
PAINLEVEL_OUTOF10: 8
PAINLEVEL_OUTOF10: 3
PAINLEVEL_OUTOF10: 3

## 2019-05-10 ASSESSMENT — PAIN DESCRIPTION - LOCATION
LOCATION: ABDOMEN
LOCATION: ABDOMEN;GROIN
LOCATION: ABDOMEN

## 2019-05-10 ASSESSMENT — PAIN DESCRIPTION - PAIN TYPE
TYPE: ACUTE PAIN

## 2019-05-10 ASSESSMENT — PAIN DESCRIPTION - ORIENTATION
ORIENTATION: LOWER
ORIENTATION: LOWER
ORIENTATION: ANTERIOR

## 2019-05-10 ASSESSMENT — PAIN DESCRIPTION - FREQUENCY
FREQUENCY: CONTINUOUS
FREQUENCY: INTERMITTENT
FREQUENCY: CONTINUOUS

## 2019-05-10 ASSESSMENT — PAIN DESCRIPTION - PROGRESSION: CLINICAL_PROGRESSION: GRADUALLY IMPROVING

## 2019-05-10 NOTE — ED PROVIDER NOTES
ED Attending  CC: Linda     Department of Emergency Medicine   ED  Provider Note  Admit Date/RoomTime: 5/10/2019  3:31 PM  ED Room: Gladys DAMON/ROMANA  MRN: [id number]  Chief Complaint:   Abdominal Pain (c/o low abdominal pain, was dx with UTI and on 2 different antibiotics, finished them 4 days ago and now having burning, frequency and urgency)       History of Present Illness   Source of history provided by:  patient. History/Exam Limitations: none. Kvng Link is a 80 y.o. female who has a past medical history of:   Past Medical History:   Diagnosis Date    Acute gastroenteritis 11/27/2018    Arthritis     Asthma     AVB (atrioventricular block) 6/20/2012    Cancer ([facility])     colon    Chest pain 7/11/2013    Chronic kidney disease, stage 3 ([facility])     begining of stage 3    Complicated UTI (urinary tract infection) 10/8/2017    COPD (chronic obstructive pulmonary disease) ([facility])     COPD (chronic obstructive pulmonary disease) ([facility] [address] 1/13/2017    GERD (gastroesophageal reflux disease)     Hypertension     uncontrolled BP    Hypokalemia 11/27/2018    Symptomatic bradycardia     presents to the ED by ambulance for UTI symptoms, beginning a few weeks ago and are gradually worsening since that time. The complaint has been persistent, moderate in severity. 80year old female recently seen and evaluated for urinary tract infection, discharged home on Keflex. Patient states that she had been treated a couple weeks prior to that for urinary tract infection as well. Symptoms have not resolved over these 2 treatment time frames. She is now having suprapubic pain and burning and has some dark discoloration to her urine which makes her concern for blood in the urine as well. She denies fevers or chills but admits to decreased appetite and decreased intake of food and fluids. Denies any pains in the abdomen outside of the suprapubic region, specifically there is no flank pains bilaterally.  She is having an increased icterus. Oral mucosa pink, moist, intact without lesion. · Neck: Supple, full ROM, non tender to palpation in the midline, no stridor, no crepitus, no meningeal signs  · Respiratory: Lungs clear to auscultation bilaterally, no wheezes, rales, or rhonchi. Not in respiratory distress  · CV:  Regular rate. Regular rhythm. No murmurs, gallops, or rubs. 2+ distal pulses  · Chest: No chest wall tenderness  · GI:  Abdomen Soft, Non tender, Non distended. +BS. No rebound, guarding, or rigidity. No pulsatile masses. · : Bilateral CVA region is nontender on palpation or percussion. Suprapubic region has some tenderness appreciated with no rebound, guarding, rigidity or distention. · Musculoskeletal: Moves all extremities x 4. Warm and well perfused, no clubbing, cyanosis, or edema. Capillary refill <3 seconds  · Integument: skin warm and dry. No rashes.    · Lymphatic: no lymphadenopathy noted  · Neurologic: GCS 15, no focal deficits, symmetric strength 5/5 in the upper and lower extremities bilaterally  · Psychiatric: Normal Affect      Lab / Imaging Results   (All laboratory and radiology results have been personally reviewed by myself)  Labs:  Results for orders placed or performed during the hospital encounter of 05/10/19   CBC Auto Differential   Result Value Ref Range    WBC 21.7 (H) 4.5 - 11.5 E9/L    RBC 5.34 3.50 - 5.50 E12/L    Hemoglobin 14.6 11.5 - 15.5 g/dL    Hematocrit 44.8 34.0 - 48.0 %    MCV 83.9 80.0 - 99.9 fL    MCH 27.3 26.0 - 35.0 pg    MCHC 32.6 32.0 - 34.5 %    RDW 13.5 11.5 - 15.0 fL    Platelets 669 710 - 053 E9/L    MPV 10.1 7.0 - 12.0 fL    Neutrophils % 84.8 (H) 43.0 - 80.0 %    Immature Granulocytes % 0.6 0.0 - 5.0 %    Lymphocytes % 6.6 (L) 20.0 - 42.0 %    Monocytes % 7.1 2.0 - 12.0 %    Eosinophils % 0.6 0.0 - 6.0 %    Basophils % 0.3 0.0 - 2.0 %    Neutrophils # 18.38 (H) 1.80 - 7.30 E9/L    Immature Granulocytes # 0.13 E9/L    Lymphocytes # 1.42 (L) 1.50 - 4.00 E9/L    Monocytes # dextrose 5 % 50 mL IVPB (vial-mate) (0 g Intravenous Stopped 5/10/19 1700)        Re-Evaluations:  5/10/19      Time: 5:45 pm    Patients symptoms show only minimal improvement. Given the recurrent nature of the patient's urinary tract infection the patient's primary care provider was consult at and is agreeable with plan for admission. Gentamicin was added to the patient's regimen as well as a CT scan with IV contrast to evaluate the urinary tract further and rule out complicated urinary tract infection such as pyelonephritis or renal abscess. Consultations:             IP CONSULT TO PRIMARY CARE PROVIDER    Procedures:   none    MDM:  80year old female presenting to the emergency department for evaluation of ongoing urinary tract infection symptoms with suprapubic discomfort. Urine does show signs of infection as well as hematuria could be hemorrhagic cystitis most likely as the patient is not having flank symptoms or other findings would suggest a ureteral calculus. She meets criteria for sepsis based on leukocytosis and elevated pulse however she is mentating normally and has an elevated blood pressure with no findings to suggest severe sepsis or shock. Patient is given IV fluids as well as Rocephin after blood cultures ×2 are obtained. She has normal renal functions and electrolytes and otherwise labs are unremarkable. Given the recurrent nature of her infection, urine culture is reviewed and is found to be sensitive to Rocephin however gentamicin was added as well due to the complicated nature of her urinary tract infection and a CT scan of the abdomen and pelvis with IV contrast to rule out pyelonephritis and renal abscess at the recommendations of the ER physician. Patient's case is discussed with the primary care provider who will admit, bridging orders are placed.     Counseling:   I have spoken with the patient and discussed todays results, in addition to providing specific details for the plan of

## 2019-05-10 NOTE — ED NOTES
Bed: HB  Expected date:   Expected time:   Means of arrival:   Comments:     Puja Augustine, RAE  05/10/19 1697

## 2019-05-11 LAB
ALBUMIN SERPL-MCNC: 3.5 G/DL (ref 3.5–5.2)
ALP BLD-CCNC: 100 U/L (ref 35–104)
ALT SERPL-CCNC: 17 U/L (ref 0–32)
ANION GAP SERPL CALCULATED.3IONS-SCNC: 14 MMOL/L (ref 7–16)
AST SERPL-CCNC: 23 U/L (ref 0–31)
BILIRUB SERPL-MCNC: 0.7 MG/DL (ref 0–1.2)
BUN BLDV-MCNC: 19 MG/DL (ref 8–23)
CALCIUM SERPL-MCNC: 8.3 MG/DL (ref 8.6–10.2)
CHLORIDE BLD-SCNC: 99 MMOL/L (ref 98–107)
CO2: 21 MMOL/L (ref 22–29)
CREAT SERPL-MCNC: 0.9 MG/DL (ref 0.5–1)
GFR AFRICAN AMERICAN: >60
GFR NON-AFRICAN AMERICAN: 60 ML/MIN/1.73
GLUCOSE BLD-MCNC: 115 MG/DL (ref 74–99)
HCT VFR BLD CALC: 39.1 % (ref 34–48)
HEMOGLOBIN: 12.8 G/DL (ref 11.5–15.5)
MCH RBC QN AUTO: 27.3 PG (ref 26–35)
MCHC RBC AUTO-ENTMCNC: 32.7 % (ref 32–34.5)
MCV RBC AUTO: 83.4 FL (ref 80–99.9)
PDW BLD-RTO: 13.9 FL (ref 11.5–15)
PLATELET # BLD: 247 E9/L (ref 130–450)
PMV BLD AUTO: 10.2 FL (ref 7–12)
POTASSIUM SERPL-SCNC: 3.6 MMOL/L (ref 3.5–5)
RBC # BLD: 4.69 E12/L (ref 3.5–5.5)
SODIUM BLD-SCNC: 134 MMOL/L (ref 132–146)
TOTAL PROTEIN: 6.3 G/DL (ref 6.4–8.3)
WBC # BLD: 16.9 E9/L (ref 4.5–11.5)

## 2019-05-11 PROCEDURE — 36415 COLL VENOUS BLD VENIPUNCTURE: CPT

## 2019-05-11 PROCEDURE — 2580000003 HC RX 258: Performed by: INTERNAL MEDICINE

## 2019-05-11 PROCEDURE — 6360000002 HC RX W HCPCS: Performed by: INTERNAL MEDICINE

## 2019-05-11 PROCEDURE — 80053 COMPREHEN METABOLIC PANEL: CPT

## 2019-05-11 PROCEDURE — 6370000000 HC RX 637 (ALT 250 FOR IP): Performed by: INTERNAL MEDICINE

## 2019-05-11 PROCEDURE — 2060000000 HC ICU INTERMEDIATE R&B

## 2019-05-11 PROCEDURE — 85027 COMPLETE CBC AUTOMATED: CPT

## 2019-05-11 RX ORDER — SODIUM CHLORIDE 9 MG/ML
INJECTION, SOLUTION INTRAVENOUS CONTINUOUS
Status: DISCONTINUED | OUTPATIENT
Start: 2019-05-11 | End: 2019-05-13 | Stop reason: HOSPADM

## 2019-05-11 RX ADMIN — ONDANSETRON HYDROCHLORIDE 4 MG: 2 SOLUTION INTRAMUSCULAR; INTRAVENOUS at 16:20

## 2019-05-11 RX ADMIN — SODIUM CHLORIDE: 9 INJECTION, SOLUTION INTRAVENOUS at 10:05

## 2019-05-11 RX ADMIN — PANTOPRAZOLE SODIUM 40 MG: 40 TABLET, DELAYED RELEASE ORAL at 06:44

## 2019-05-11 RX ADMIN — VITAMIN D, TAB 1000IU (100/BT) 2000 UNITS: 25 TAB at 09:23

## 2019-05-11 RX ADMIN — ERTAPENEM 1000 MG: 1 INJECTION INTRAMUSCULAR; INTRAVENOUS at 14:30

## 2019-05-11 RX ADMIN — ACETAMINOPHEN 650 MG: 325 TABLET, FILM COATED ORAL at 14:36

## 2019-05-11 RX ADMIN — ONDANSETRON HYDROCHLORIDE 4 MG: 2 SOLUTION INTRAMUSCULAR; INTRAVENOUS at 10:16

## 2019-05-11 RX ADMIN — ONDANSETRON HYDROCHLORIDE 4 MG: 2 SOLUTION INTRAMUSCULAR; INTRAVENOUS at 22:15

## 2019-05-11 RX ADMIN — SODIUM CHLORIDE: 9 INJECTION, SOLUTION INTRAVENOUS at 22:16

## 2019-05-11 RX ADMIN — ONDANSETRON HYDROCHLORIDE 4 MG: 2 SOLUTION INTRAMUSCULAR; INTRAVENOUS at 03:48

## 2019-05-11 RX ADMIN — ENOXAPARIN SODIUM 40 MG: 40 INJECTION SUBCUTANEOUS at 09:23

## 2019-05-11 RX ADMIN — ASPIRIN 81 MG: 81 TABLET ORAL at 09:23

## 2019-05-11 RX ADMIN — ALUMINUM HYDROXIDE, MAGNESIUM HYDROXIDE, AND SIMETHICONE 30 ML: 200; 200; 20 SUSPENSION ORAL at 01:34

## 2019-05-11 ASSESSMENT — PAIN DESCRIPTION - FREQUENCY: FREQUENCY: INTERMITTENT

## 2019-05-11 ASSESSMENT — PAIN DESCRIPTION - DESCRIPTORS: DESCRIPTORS: PRESSURE

## 2019-05-11 ASSESSMENT — PAIN SCALES - GENERAL
PAINLEVEL_OUTOF10: 0
PAINLEVEL_OUTOF10: 4
PAINLEVEL_OUTOF10: 4

## 2019-05-11 ASSESSMENT — PAIN DESCRIPTION - PAIN TYPE: TYPE: ACUTE PAIN

## 2019-05-11 ASSESSMENT — PAIN DESCRIPTION - LOCATION: LOCATION: ABDOMEN

## 2019-05-11 NOTE — CONSULTS
ESBL E Coli UTI  · Recent exposure to bactrim and keflex    Plan:    · Switch to iv invanz. Dc rocephin  · F/u UC   · Monitor labs  · Will follow with you    Thank you for having us see this patient in consultation. I will be discussing this case with the treating physicians.     Electronically signed by Lilly Pena MD on 5/11/2019 at 6:14 PM

## 2019-05-11 NOTE — PROGRESS NOTES
Onset one day of suprapubic pain and recurrent dysuria   Completed one week of cephalexin from ED recently for UTI  Has history of complicated UTI  Was hypotensive this AM that has improved with isotonic saline  Alert and oriented  RRR  Lungs clear  Abdomen with suprapubic fullness / tenderness  Cultures all pending  Antibiotic management as per ID

## 2019-05-11 NOTE — H&P
[address]                  [address] [facility],  [facility]                              HISTORY AND PHYSICAL    PATIENT NAME: ED MIRANDA                        :        1935  MED REC NO:   [id number]                            ROOM:       8511  ACCOUNT NO:   [de-identified]                           ADMIT DATE: 05/10/2019  PROVIDER:     Howie Hernandez DO    CHIEF COMPLAINT:  Abdominal pain, dysuria, and urinary frequency. HISTORY OF PRESENT ILLNESS:  The patient is an 60-year-old female with  history of complicated urinary tract infection who describes onset of  symptoms on the day of admission including lower suprapubic abdominal  pain associated with dysuria and urinary frequency. The symptoms  worsened over the next 12 hours. She had recently completed a seven-day  course of cephalexin for a urinary tract infection that was diagnosed  and treated from the emergency room just one week ago. She denies  fevers, chills, or night sweats. Admits to nausea and abdominal pain. No chest pains or dyspnea. MEDICATIONS:  See chart. PAST MEDICAL HISTORY:  She has a history of pacemaker for heart block;  various different cardiac rhythm disorders including SVT, but no known  atrial fibrillation; COPD; hypertension; degenerative joint disease. PAST SURGICAL HISTORY:  She has had cholecystectomy. She has had lumbar  spinal surgery and appendectomy in the past.    SOCIAL HISTORY:  No alcohol. No tobacco.    FAMILY HISTORY:  Not available. REVIEW OF SYSTEMS:  NEUROMUSCULAR:  Denies headache or dizziness. RESPIRATORY:  Denies shortness of breath, cough, fever, or night sweats. CARDIOVASCULAR:  Denies chest pains or palpitation. GASTROINTESTINAL:  She has recurrent nausea frequently. No vomiting. No melena. No hematochezia. She has lower abdominal suprapubic pain  and tenderness.   GENITOURINARY:  She has dysuria and urinary frequency beginning  yesterday. No hematuria apparently. MUSCULOSKELETAL:  Has chronic bilateral knee pain. HEMATOLOGIC:  Denies. PSYCHIATRIC:  Denies. PHYSICAL EXAMINATION:  GENERAL:  The patient is a pleasant 27-year-old female who is alert and  oriented in no acute distress. HEENT:  Head normal size and shape. Pupils were equal.  Sclerae were  clear. Extraocular movements were intact. Fundi were not visualized. Tympanic membranes were not visualized. Oral mucous membranes were  clear and adequately hydrated. NECK:  Supple. Neck veins were flat. No jugular venous distention was  noted. No palpable extrinsic neck masses or cervical lymphadenopathy  was apparent. No carotid bruits were noted on exam.  LUNGS:  Clear to auscultation bilaterally. CARDIOVASCULAR:  Regular rate and rhythm. ABDOMEN:  Soft with bowel sounds intact. There was fullness with  associated tenderness noted along examination of the suprapubic area. There was dullness to percussion at this site as well. No rebound  tenderness. Femoral pulses were symmetric. GENITOURINARY:  Deferred. RECTAL:  Deferred. EXTREMITIES:  No edema. No clubbing. No cyanosis. No asymmetry of  lower extremity circumference. Distal pulses were intact. NEUROLOGIC:  No focal neurologic deficits. CLINICAL IMPRESSION:  1. Complicated urinary tract infection. 2.  Sepsis. 3.  Rule out bacteremia. 4.  COPD.  5.  Status post pacemaker.         Jameson Aj DO    D: 05/11/2019 12:56:23       T: 05/11/2019 13:07:17     [email or web address]  Job#: [id number]     Doc#: [id number]    CC:

## 2019-05-11 NOTE — PROGRESS NOTES
Ute Mims was ordered avapro which is a nonformulary medication. The patient's home supply of this medication should be brought in to the hospital for inpatient use. If the medication has not been administered by 1400 on the following day from the time the order was placed, a pharmacist will follow-up with the nurse of the patient to assess the capability of the patient to bring in the medication. If it is determined that the patient cannot supply the medication and it is not available to be dispensed from the pharmacy, a call will be placed to the ordering provider to discuss alternative options.

## 2019-05-12 LAB
ALBUMIN SERPL-MCNC: 3 G/DL (ref 3.5–5.2)
ALP BLD-CCNC: 85 U/L (ref 35–104)
ALT SERPL-CCNC: 13 U/L (ref 0–32)
ANION GAP SERPL CALCULATED.3IONS-SCNC: 11 MMOL/L (ref 7–16)
AST SERPL-CCNC: 18 U/L (ref 0–31)
BILIRUB SERPL-MCNC: 0.4 MG/DL (ref 0–1.2)
BUN BLDV-MCNC: 17 MG/DL (ref 8–23)
CALCIUM SERPL-MCNC: 7.9 MG/DL (ref 8.6–10.2)
CHLORIDE BLD-SCNC: 106 MMOL/L (ref 98–107)
CO2: 22 MMOL/L (ref 22–29)
CREAT SERPL-MCNC: 0.9 MG/DL (ref 0.5–1)
GFR AFRICAN AMERICAN: >60
GFR NON-AFRICAN AMERICAN: 60 ML/MIN/1.73
GLUCOSE BLD-MCNC: 91 MG/DL (ref 74–99)
HCT VFR BLD CALC: 36.2 % (ref 34–48)
HEMOGLOBIN: 11.6 G/DL (ref 11.5–15.5)
MCH RBC QN AUTO: 27.2 PG (ref 26–35)
MCHC RBC AUTO-ENTMCNC: 32 % (ref 32–34.5)
MCV RBC AUTO: 85 FL (ref 80–99.9)
ORGANISM: ABNORMAL
PDW BLD-RTO: 13.9 FL (ref 11.5–15)
PLATELET # BLD: 197 E9/L (ref 130–450)
PMV BLD AUTO: 10.1 FL (ref 7–12)
POTASSIUM SERPL-SCNC: 3.5 MMOL/L (ref 3.5–5)
RBC # BLD: 4.26 E12/L (ref 3.5–5.5)
SODIUM BLD-SCNC: 139 MMOL/L (ref 132–146)
TOTAL PROTEIN: 5.6 G/DL (ref 6.4–8.3)
URINE CULTURE, ROUTINE: ABNORMAL
URINE CULTURE, ROUTINE: ABNORMAL
WBC # BLD: 8.7 E9/L (ref 4.5–11.5)

## 2019-05-12 PROCEDURE — 2580000003 HC RX 258: Performed by: INTERNAL MEDICINE

## 2019-05-12 PROCEDURE — 85027 COMPLETE CBC AUTOMATED: CPT

## 2019-05-12 PROCEDURE — 36415 COLL VENOUS BLD VENIPUNCTURE: CPT

## 2019-05-12 PROCEDURE — 6360000002 HC RX W HCPCS: Performed by: INTERNAL MEDICINE

## 2019-05-12 PROCEDURE — 2060000000 HC ICU INTERMEDIATE R&B

## 2019-05-12 PROCEDURE — 80053 COMPREHEN METABOLIC PANEL: CPT

## 2019-05-12 PROCEDURE — 6370000000 HC RX 637 (ALT 250 FOR IP): Performed by: INTERNAL MEDICINE

## 2019-05-12 RX ADMIN — ENOXAPARIN SODIUM 40 MG: 40 INJECTION SUBCUTANEOUS at 09:54

## 2019-05-12 RX ADMIN — ASPIRIN 81 MG: 81 TABLET ORAL at 09:54

## 2019-05-12 RX ADMIN — ACETAMINOPHEN 650 MG: 325 TABLET, FILM COATED ORAL at 02:28

## 2019-05-12 RX ADMIN — SODIUM CHLORIDE: 9 INJECTION, SOLUTION INTRAVENOUS at 09:55

## 2019-05-12 RX ADMIN — ACETAMINOPHEN 650 MG: 325 TABLET, FILM COATED ORAL at 16:29

## 2019-05-12 RX ADMIN — VITAMIN D, TAB 1000IU (100/BT) 2000 UNITS: 25 TAB at 09:54

## 2019-05-12 RX ADMIN — ERTAPENEM 1000 MG: 1 INJECTION INTRAMUSCULAR; INTRAVENOUS at 11:43

## 2019-05-12 RX ADMIN — TRIAMTERENE AND HYDROCHLOROTHIAZIDE 1 TABLET: 37.5; 25 TABLET ORAL at 10:00

## 2019-05-12 RX ADMIN — PANTOPRAZOLE SODIUM 40 MG: 40 TABLET, DELAYED RELEASE ORAL at 05:50

## 2019-05-12 ASSESSMENT — PAIN SCALES - GENERAL
PAINLEVEL_OUTOF10: 6
PAINLEVEL_OUTOF10: 0
PAINLEVEL_OUTOF10: 0
PAINLEVEL_OUTOF10: 3
PAINLEVEL_OUTOF10: 2
PAINLEVEL_OUTOF10: 0

## 2019-05-12 ASSESSMENT — PAIN DESCRIPTION - PAIN TYPE: TYPE: ACUTE PAIN

## 2019-05-12 ASSESSMENT — PAIN DESCRIPTION - FREQUENCY: FREQUENCY: INTERMITTENT

## 2019-05-12 ASSESSMENT — PAIN DESCRIPTION - ORIENTATION: ORIENTATION: MID;LOWER

## 2019-05-12 ASSESSMENT — PAIN DESCRIPTION - DESCRIPTORS: DESCRIPTORS: ACHING;DISCOMFORT;SHOOTING

## 2019-05-12 ASSESSMENT — PAIN DESCRIPTION - LOCATION: LOCATION: BACK

## 2019-05-12 NOTE — PROGRESS NOTES
[address] [place] [address] [facility]  [facility]    Consultation Note     CC: abdominal pain    SUBJECTIVE:    Seen and examined at bedside. She is alert and oriented. Denies diarrhea and urinary symptoms. She is afebrile, no leukocytosis. Tolerating ATB without side effects. Abdominal pain resolving, no flank pain. Denies nausea, vomiting, fever, chills.       REVIEW OF SYSTEMS:    14 ROS negative unless otherwise specified in the HPI    PHYSICAL EXAM:    Vitals:    /68   Pulse 89   Temp 97.3 °F (36.3 °C)   Resp 16   Ht 5' 1\" (1.549 m)   Wt 151 lb 11.2 oz (68.8 kg)   SpO2 99%   BMI 28.66 kg/m²     General Appearance:    Alert, cooperative, no distress, appears stated age   Head:    Normocephalic, without obvious abnormality, atraumatic   Eyes:    PERRL, conjunctiva/corneas clear      Ears:    Normal and external ear canals, both ears   Nose:   Nares normal, septum midline, mucosa normal, no drainage    or sinus tenderness   Throat:   Lips, mucosa, and tongue normal; teeth and gums normal   Neck:   Supple, trachea midline, no adenopathy; no JVD   Lungs:     Clear to auscultation bilaterally, respirations unlabored   Chest wall:    No tenderness or deformity   Heart:    Regular rate and rhythm, S1 and S2 normal, no murmur, rub   or gallop   Abdomen:     Soft, non-tender, bowel sounds active all four quadrants,     no masses, no organomegaly   Extremities:   Extremities normal, atraumatic, no cyanosis or edema   Pulses:   2+ and symmetric all extremities   Skin:   Skin color, texture, turgor normal, no rashes or lesions       CBC+dif:  Reviewed and trend followed    Radiology:  Noted    Microbiology:  Pending  Recent Labs     05/10/19  1610   BC 24 Hours- no growth     Recent Labs     05/10/19  1605   ORG Escherichia coli*       Assessment:  · Recurrent UTI  · Leucocytosis - resolved   · H/O ESBL E Coli UTI -- UC E coli will follow   · Recent exposure to bactrim and keflex  · BC + - GP diphtheroid

## 2019-05-12 NOTE — PROGRESS NOTES
Overall improved  Less abdominal pain and dysuria  Afebrile , vs stable  RRR  Lungs clear  Abdomen soft with less tenderness  Urine culture - Ecoli  BC - gram positive rods  Stop metoprolol (\"I can't take beta blockers\")  Decrease parenteral fluids  Antibiotics as per ID

## 2019-05-13 VITALS
BODY MASS INDEX: 28.64 KG/M2 | TEMPERATURE: 97.8 F | HEART RATE: 82 BPM | SYSTOLIC BLOOD PRESSURE: 122 MMHG | WEIGHT: 151.7 LBS | RESPIRATION RATE: 16 BRPM | HEIGHT: 61 IN | OXYGEN SATURATION: 95 % | DIASTOLIC BLOOD PRESSURE: 54 MMHG

## 2019-05-13 LAB
ALBUMIN SERPL-MCNC: 3.3 G/DL (ref 3.5–5.2)
ALP BLD-CCNC: 90 U/L (ref 35–104)
ALT SERPL-CCNC: 15 U/L (ref 0–32)
ANION GAP SERPL CALCULATED.3IONS-SCNC: 9 MMOL/L (ref 7–16)
AST SERPL-CCNC: 25 U/L (ref 0–31)
BILIRUB SERPL-MCNC: 0.5 MG/DL (ref 0–1.2)
BUN BLDV-MCNC: 12 MG/DL (ref 8–23)
CALCIUM SERPL-MCNC: 8.7 MG/DL (ref 8.6–10.2)
CHLORIDE BLD-SCNC: 102 MMOL/L (ref 98–107)
CO2: 26 MMOL/L (ref 22–29)
CREAT SERPL-MCNC: 0.8 MG/DL (ref 0.5–1)
GFR AFRICAN AMERICAN: >60
GFR NON-AFRICAN AMERICAN: >60 ML/MIN/1.73
GLUCOSE BLD-MCNC: 102 MG/DL (ref 74–99)
HCT VFR BLD CALC: 36.9 % (ref 34–48)
HEMOGLOBIN: 11.9 G/DL (ref 11.5–15.5)
MCH RBC QN AUTO: 27.7 PG (ref 26–35)
MCHC RBC AUTO-ENTMCNC: 32.2 % (ref 32–34.5)
MCV RBC AUTO: 85.8 FL (ref 80–99.9)
PDW BLD-RTO: 13.6 FL (ref 11.5–15)
PLATELET # BLD: 194 E9/L (ref 130–450)
PMV BLD AUTO: 10.1 FL (ref 7–12)
POTASSIUM SERPL-SCNC: 3.4 MMOL/L (ref 3.5–5)
RBC # BLD: 4.3 E12/L (ref 3.5–5.5)
SODIUM BLD-SCNC: 137 MMOL/L (ref 132–146)
TOTAL PROTEIN: 6.3 G/DL (ref 6.4–8.3)
WBC # BLD: 5.5 E9/L (ref 4.5–11.5)

## 2019-05-13 PROCEDURE — 6360000002 HC RX W HCPCS: Performed by: INTERNAL MEDICINE

## 2019-05-13 PROCEDURE — 80053 COMPREHEN METABOLIC PANEL: CPT

## 2019-05-13 PROCEDURE — 2580000003 HC RX 258: Performed by: INTERNAL MEDICINE

## 2019-05-13 PROCEDURE — 6370000000 HC RX 637 (ALT 250 FOR IP): Performed by: INTERNAL MEDICINE

## 2019-05-13 PROCEDURE — 36415 COLL VENOUS BLD VENIPUNCTURE: CPT

## 2019-05-13 PROCEDURE — 85027 COMPLETE CBC AUTOMATED: CPT

## 2019-05-13 RX ORDER — LEVOFLOXACIN 250 MG/1
375 TABLET ORAL DAILY
Qty: 6 TABLET | Refills: 0 | Status: SHIPPED | OUTPATIENT
Start: 2019-05-13 | End: 2019-05-13

## 2019-05-13 RX ORDER — LEVOFLOXACIN 750 MG/1
375 TABLET ORAL DAILY
Status: DISCONTINUED | OUTPATIENT
Start: 2019-05-13 | End: 2019-05-13 | Stop reason: HOSPADM

## 2019-05-13 RX ORDER — LEVOFLOXACIN 250 MG/1
375 TABLET ORAL DAILY
Qty: 6 TABLET | Refills: 0 | Status: SHIPPED | OUTPATIENT
Start: 2019-05-13 | End: 2019-05-17

## 2019-05-13 RX ORDER — PANTOPRAZOLE SODIUM 40 MG/1
40 TABLET, DELAYED RELEASE ORAL
Qty: 30 TABLET | Refills: 3 | Status: SHIPPED | OUTPATIENT
Start: 2019-05-14 | End: 2019-10-11

## 2019-05-13 RX ORDER — POTASSIUM CHLORIDE 20 MEQ/1
40 TABLET, EXTENDED RELEASE ORAL ONCE
Status: DISCONTINUED | OUTPATIENT
Start: 2019-05-13 | End: 2019-05-13

## 2019-05-13 RX ORDER — POTASSIUM CHLORIDE 20 MEQ/1
20 TABLET, EXTENDED RELEASE ORAL ONCE
Status: COMPLETED | OUTPATIENT
Start: 2019-05-13 | End: 2019-05-13

## 2019-05-13 RX ADMIN — TRIAMTERENE AND HYDROCHLOROTHIAZIDE 1 TABLET: 37.5; 25 TABLET ORAL at 08:33

## 2019-05-13 RX ADMIN — POTASSIUM CHLORIDE 20 MEQ: 20 TABLET, EXTENDED RELEASE ORAL at 13:13

## 2019-05-13 RX ADMIN — VITAMIN D, TAB 1000IU (100/BT) 2000 UNITS: 25 TAB at 08:33

## 2019-05-13 RX ADMIN — PANTOPRAZOLE SODIUM 40 MG: 40 TABLET, DELAYED RELEASE ORAL at 06:00

## 2019-05-13 RX ADMIN — LEVOFLOXACIN 375 MG: 750 TABLET, FILM COATED ORAL at 14:16

## 2019-05-13 RX ADMIN — ASPIRIN 81 MG: 81 TABLET ORAL at 08:33

## 2019-05-13 RX ADMIN — SODIUM CHLORIDE: 9 INJECTION, SOLUTION INTRAVENOUS at 06:00

## 2019-05-13 RX ADMIN — ENOXAPARIN SODIUM 40 MG: 40 INJECTION SUBCUTANEOUS at 08:32

## 2019-05-13 RX ADMIN — ACETAMINOPHEN 650 MG: 325 TABLET, FILM COATED ORAL at 00:29

## 2019-05-13 RX ADMIN — ACETAMINOPHEN 650 MG: 325 TABLET, FILM COATED ORAL at 07:34

## 2019-05-13 ASSESSMENT — PAIN - FUNCTIONAL ASSESSMENT: PAIN_FUNCTIONAL_ASSESSMENT: ACTIVITIES ARE NOT PREVENTED

## 2019-05-13 ASSESSMENT — PAIN SCALES - GENERAL
PAINLEVEL_OUTOF10: 0
PAINLEVEL_OUTOF10: 5
PAINLEVEL_OUTOF10: 5

## 2019-05-13 ASSESSMENT — PAIN DESCRIPTION - PAIN TYPE: TYPE: ACUTE PAIN

## 2019-05-13 ASSESSMENT — PAIN DESCRIPTION - LOCATION: LOCATION: HIP

## 2019-05-13 ASSESSMENT — PAIN DESCRIPTION - ONSET: ONSET: GRADUAL

## 2019-05-13 ASSESSMENT — PAIN DESCRIPTION - FREQUENCY: FREQUENCY: INTERMITTENT

## 2019-05-13 ASSESSMENT — PAIN DESCRIPTION - DESCRIPTORS: DESCRIPTORS: ACHING;DISCOMFORT

## 2019-05-13 NOTE — PROGRESS NOTES
CC- UTI    Subjective: The patient is awake and alert. Tolerating medications. Reports no side effects. Afebrile. 10 ROS otherwise negative unless otherwise specified above. Objective:    Vitals:    05/13/19 0815   BP: (!) 122/54   Pulse: 82   Resp: 16   Temp: 97.8 °F (36.6 °C)   SpO2: 95%       General Appearance:    Awake, alert , no acute distress.    HEENT:    Normocephalic,PERRL,neck supple, no JVD, mucosa moist, no thrush   Lungs:     Clear to auscultation bilaterally, no wheeze , crackles   Heart:    Regular rate and rhythm, no murmur   Abdomen:     Soft, non-tender, not distended  bowel sounds present,   Extremities:   No edema,no open wound,no erythema, non  tender   Skin:   no rashes or lesions     CBC+dif:  Reviewed and trend followed    Radiology:  Noted    Microbiology:  Pending  Recent Labs     05/10/19  1610   BC 24 Hours- no growth     Recent Labs     05/10/19  1605   ORG Escherichia coli*       Assessment:  · Recurrent UTI  · Leucocytosis - resolved   · H/O ESBL E Coli UTI -- UC E coli will follow   · Recent exposure to bactrim and keflex  · BC + - GP diphtheroid like contaminant    Plan:    · Dc invanz  · Switch to levaquin for 3 days more    Electronically signed by Reji Sargent MD on 5/13/2019 at 10:23 AM

## 2019-05-13 NOTE — CARE COORDINATION
Patient to discharge home today. Needs ride arranged home. Call placed to [facility] a Rajendra Moore, unable to take trip. Call placed to [address] [place] [address] [phone], trip will cost $60 and patient is agreeable. They will  patient between 5 and 6p. If they can come earlier they will call nurses station.

## 2019-05-15 LAB — BLOOD CULTURE, ROUTINE: NORMAL

## 2019-05-16 LAB
CULTURE, BLOOD 2: ABNORMAL
CULTURE, BLOOD 2: ABNORMAL
ORGANISM: ABNORMAL

## 2019-06-03 ENCOUNTER — NURSE ONLY (OUTPATIENT)
Dept: NON INVASIVE DIAGNOSTICS | Age: 84
End: 2019-06-03
Payer: MEDICARE

## 2019-06-03 DIAGNOSIS — Z95.0 CARDIAC PACEMAKER IN SITU: Primary | ICD-10-CM

## 2019-06-03 PROCEDURE — 93294 REM INTERROG EVL PM/LDLS PM: CPT | Performed by: INTERNAL MEDICINE

## 2019-06-03 PROCEDURE — 93296 REM INTERROG EVL PM/IDS: CPT | Performed by: INTERNAL MEDICINE

## 2019-06-24 NOTE — PROGRESS NOTES
See [employer] [facility] report. Remote monitoring reviewed over a 90 day period.   End of 90 day monitoring period date of service 06/03/19

## 2019-07-23 ENCOUNTER — APPOINTMENT (OUTPATIENT)
Dept: GENERAL RADIOLOGY | Age: 84
End: 2019-07-23
Payer: MEDICARE

## 2019-07-23 ENCOUNTER — HOSPITAL ENCOUNTER (EMERGENCY)
Age: 84
Discharge: HOME | End: 2019-07-23
Payer: MEDICARE

## 2019-07-23 VITALS
RESPIRATION RATE: 20 BRPM | HEIGHT: 61 IN | DIASTOLIC BLOOD PRESSURE: 85 MMHG | HEART RATE: 75 BPM | BODY MASS INDEX: 28.7 KG/M2 | WEIGHT: 152 LBS | TEMPERATURE: 97.6 F | SYSTOLIC BLOOD PRESSURE: 181 MMHG | OXYGEN SATURATION: 96 %

## 2019-07-23 DIAGNOSIS — G89.29 ACUTE EXACERBATION OF CHRONIC LOW BACK PAIN: Primary | ICD-10-CM

## 2019-07-23 DIAGNOSIS — G89.29 CHRONIC RIGHT HIP PAIN: ICD-10-CM

## 2019-07-23 DIAGNOSIS — M25.551 CHRONIC RIGHT HIP PAIN: ICD-10-CM

## 2019-07-23 DIAGNOSIS — M54.50 ACUTE EXACERBATION OF CHRONIC LOW BACK PAIN: Primary | ICD-10-CM

## 2019-07-23 PROCEDURE — 73502 X-RAY EXAM HIP UNI 2-3 VIEWS: CPT

## 2019-07-23 PROCEDURE — 72100 X-RAY EXAM L-S SPINE 2/3 VWS: CPT

## 2019-07-23 PROCEDURE — 99283 EMERGENCY DEPT VISIT LOW MDM: CPT

## 2019-07-23 PROCEDURE — 6360000002 HC RX W HCPCS: Performed by: PHYSICIAN ASSISTANT

## 2019-07-23 RX ORDER — DEXAMETHASONE SODIUM PHOSPHATE 10 MG/ML
6 INJECTION INTRAMUSCULAR; INTRAVENOUS ONCE
Status: COMPLETED | OUTPATIENT
Start: 2019-07-23 | End: 2019-07-23

## 2019-07-23 RX ORDER — PREDNISONE 10 MG/1
20 TABLET ORAL DAILY
Qty: 10 TABLET | Refills: 0 | Status: SHIPPED | OUTPATIENT
Start: 2019-07-23 | End: 2019-07-28

## 2019-07-23 RX ADMIN — DEXAMETHASONE SODIUM PHOSPHATE 6 MG: 10 INJECTION INTRAMUSCULAR; INTRAVENOUS at 13:33

## 2019-07-23 ASSESSMENT — PAIN SCALES - GENERAL: PAINLEVEL_OUTOF10: 8

## 2019-09-03 ENCOUNTER — NURSE ONLY (OUTPATIENT)
Dept: NON INVASIVE DIAGNOSTICS | Age: 84
End: 2019-09-03
Payer: MEDICARE

## 2019-09-03 DIAGNOSIS — Z95.0 CARDIAC PACEMAKER IN SITU: Primary | ICD-10-CM

## 2019-09-03 DIAGNOSIS — Z95.0 PACEMAKER: ICD-10-CM

## 2019-09-03 PROCEDURE — 93294 REM INTERROG EVL PM/LDLS PM: CPT | Performed by: INTERNAL MEDICINE

## 2019-09-03 PROCEDURE — 93296 REM INTERROG EVL PM/IDS: CPT | Performed by: INTERNAL MEDICINE

## 2019-09-17 NOTE — PROGRESS NOTES
See [employer] [place] report. Remote monitoring reviewed over a 90 day period.   End of 90 day monitoring period date of service 09/03/19

## 2019-09-27 ASSESSMENT — HOOS JR
BENDING TO THE FLOOR TO PICK UP OBJECT: 4
WALKING ON UNEVEN SURFACE: 3
LYING IN BED (TURNING OVER, MAINTAINING HIP POSITION): 4
GOING UP OR DOWN STAIRS: 3
RISING FROM SITTING: 3
SITTING: 4

## 2019-09-27 ASSESSMENT — PROMIS GLOBAL HEALTH SCALE
HOW IS THE PROMIS V1.1 BEING ADMINISTERED?: 2
WHO IS THE PERSON COMPLETING THE PROMIS V1.1 SURVEY?: 1
IN GENERAL, HOW WOULD YOU RATE YOUR PHYSICAL HEALTH [ON A SCALE OF 1 (POOR) TO 5 (EXCELLENT)]?: 3
IN GENERAL, WOULD YOU SAY YOUR HEALTH IS...[ON A SCALE OF 1 (POOR) TO 5 (EXCELLENT)]: 3
IN THE PAST 7 DAYS, HOW OFTEN HAVE YOU BEEN BOTHERED BY EMOTIONAL PROBLEMS, SUCH AS FEELING ANXIOUS, DEPRESSED, OR IRRITABLE [ON A SCALE FROM 1 (NEVER) TO 5 (ALWAYS)]?: 3
TO WHAT EXTENT ARE YOU ABLE TO CARRY OUT YOUR EVERYDAY PHYSICAL ACTIVITIES SUCH AS WALKING, CLIMBING STAIRS, CARRYING GROCERIES, OR MOVING A CHAIR [ON A SCALE OF 1 (NOT AT ALL) TO 5 (COMPLETELY)]?: 1
IN GENERAL, HOW WOULD YOU RATE YOUR SATISFACTION WITH YOUR SOCIAL ACTIVITIES AND RELATIONSHIPS [ON A SCALE OF 1 (POOR) TO 5 (EXCELLENT)]?: 1
IN GENERAL, HOW WOULD YOU RATE YOUR MENTAL HEALTH, INCLUDING YOUR MOOD AND YOUR ABILITY TO THINK [ON A SCALE OF 1 (POOR) TO 5 (EXCELLENT)]?: 3
IN THE PAST 7 DAYS, HOW WOULD YOU RATE YOUR FATIGUE ON AVERAGE [ON A SCALE FROM 1 (NONE) TO 5 (VERY SEVERE)]?: 1
IN GENERAL, PLEASE RATE HOW WELL YOU CARRY OUT YOUR USUAL SOCIAL ACTIVITIES (INCLUDES ACTIVITIES AT HOME, AT WORK, AND IN YOUR COMMUNITY, AND RESPONSIBILITIES AS A PARENT, CHILD, SPOUSE, EMPLOYEE, FRIEND, ETC) [ON A SCALE OF 1 (POOR) TO 5 (EXCELLENT)]?: 1
IN THE PAST 7 DAYS, HOW WOULD YOU RATE YOUR PAIN ON AVERAGE [ON A SCALE FROM 0 (NO PAIN) TO 10 (WORST IMAGINABLE PAIN)]?: 9
IN GENERAL, WOULD YOU SAY YOUR QUALITY OF LIFE IS...[ON A SCALE OF 1 (POOR) TO 5 (EXCELLENT)]: 3

## 2019-10-02 ENCOUNTER — HOSPITAL ENCOUNTER (EMERGENCY)
Age: 84
Discharge: HOME | End: 2019-10-02
Payer: MEDICARE

## 2019-10-02 VITALS
TEMPERATURE: 97.3 F | HEIGHT: 61 IN | RESPIRATION RATE: 14 BRPM | BODY MASS INDEX: 28.7 KG/M2 | OXYGEN SATURATION: 97 % | HEART RATE: 90 BPM | DIASTOLIC BLOOD PRESSURE: 76 MMHG | WEIGHT: 152 LBS | SYSTOLIC BLOOD PRESSURE: 149 MMHG

## 2019-10-02 DIAGNOSIS — R31.9 URINARY TRACT INFECTION WITH HEMATURIA, SITE UNSPECIFIED: Primary | ICD-10-CM

## 2019-10-02 DIAGNOSIS — N39.0 URINARY TRACT INFECTION WITH HEMATURIA, SITE UNSPECIFIED: Primary | ICD-10-CM

## 2019-10-02 LAB
BACTERIA: ABNORMAL /HPF
BILIRUBIN URINE: NEGATIVE
BLOOD, URINE: ABNORMAL
CLARITY: ABNORMAL
COLOR: YELLOW
GLUCOSE URINE: NEGATIVE MG/DL
KETONES, URINE: NEGATIVE MG/DL
LEUKOCYTE ESTERASE, URINE: ABNORMAL
NITRITE, URINE: POSITIVE
PH UA: 5.5 (ref 5–9)
PROTEIN UA: NEGATIVE MG/DL
RBC UA: ABNORMAL /HPF (ref 0–2)
SPECIFIC GRAVITY UA: 1.01 (ref 1–1.03)
UROBILINOGEN, URINE: 0.2 E.U./DL
WBC UA: >20 /HPF (ref 0–5)
YEAST: ABNORMAL

## 2019-10-02 PROCEDURE — 87186 SC STD MICRODIL/AGAR DIL: CPT

## 2019-10-02 PROCEDURE — 87088 URINE BACTERIA CULTURE: CPT

## 2019-10-02 PROCEDURE — 6370000000 HC RX 637 (ALT 250 FOR IP): Performed by: PHYSICIAN ASSISTANT

## 2019-10-02 PROCEDURE — 81001 URINALYSIS AUTO W/SCOPE: CPT

## 2019-10-02 PROCEDURE — 87077 CULTURE AEROBIC IDENTIFY: CPT

## 2019-10-02 PROCEDURE — 99283 EMERGENCY DEPT VISIT LOW MDM: CPT

## 2019-10-02 RX ORDER — CEFDINIR 300 MG/1
300 CAPSULE ORAL ONCE
Status: COMPLETED | OUTPATIENT
Start: 2019-10-02 | End: 2019-10-02

## 2019-10-02 RX ORDER — CEFDINIR 300 MG/1
300 CAPSULE ORAL 2 TIMES DAILY
Qty: 14 CAPSULE | Refills: 0 | Status: SHIPPED | OUTPATIENT
Start: 2019-10-02 | End: 2019-10-09

## 2019-10-02 RX ADMIN — CEFDINIR 300 MG: 300 CAPSULE ORAL at 21:15

## 2019-10-03 ENCOUNTER — PREP FOR PROCEDURE (OUTPATIENT)
Dept: SURGERY | Age: 84
End: 2019-10-03

## 2019-10-03 DIAGNOSIS — M16.11 PRIMARY OSTEOARTHRITIS OF RIGHT HIP: Primary | ICD-10-CM

## 2019-10-03 RX ORDER — SODIUM CHLORIDE 0.9 % (FLUSH) 0.9 %
10 SYRINGE (ML) INJECTION EVERY 12 HOURS SCHEDULED
Status: CANCELLED | OUTPATIENT
Start: 2019-10-03

## 2019-10-03 RX ORDER — SODIUM CHLORIDE 0.9 % (FLUSH) 0.9 %
10 SYRINGE (ML) INJECTION PRN
Status: CANCELLED | OUTPATIENT
Start: 2019-10-03

## 2019-10-03 RX ORDER — GABAPENTIN 100 MG/1
300 CAPSULE ORAL ONCE
Status: CANCELLED | OUTPATIENT
Start: 2019-10-03 | End: 2019-10-03

## 2019-10-03 RX ORDER — SODIUM CHLORIDE, SODIUM LACTATE, POTASSIUM CHLORIDE, CALCIUM CHLORIDE 600; 310; 30; 20 MG/100ML; MG/100ML; MG/100ML; MG/100ML
INJECTION, SOLUTION INTRAVENOUS CONTINUOUS
Status: CANCELLED | OUTPATIENT
Start: 2019-10-03

## 2019-10-03 RX ORDER — ACETAMINOPHEN 325 MG/1
1000 TABLET ORAL ONCE
Status: CANCELLED | OUTPATIENT
Start: 2019-10-03 | End: 2019-10-03

## 2019-10-03 RX ORDER — CELECOXIB 100 MG/1
100 CAPSULE ORAL ONCE
Status: CANCELLED | OUTPATIENT
Start: 2019-10-03 | End: 2019-10-03

## 2019-10-04 LAB
ORGANISM: ABNORMAL
URINE CULTURE, ROUTINE: ABNORMAL

## 2019-10-11 ENCOUNTER — HOSPITAL ENCOUNTER (OUTPATIENT)
Dept: PREADMISSION TESTING | Age: 84
Discharge: HOME | End: 2019-10-11
Payer: MEDICARE

## 2019-10-11 VITALS
WEIGHT: 157 LBS | RESPIRATION RATE: 16 BRPM | OXYGEN SATURATION: 100 % | DIASTOLIC BLOOD PRESSURE: 69 MMHG | HEART RATE: 81 BPM | SYSTOLIC BLOOD PRESSURE: 143 MMHG | BODY MASS INDEX: 29.64 KG/M2 | TEMPERATURE: 97.6 F | HEIGHT: 61 IN

## 2019-10-11 DIAGNOSIS — M16.11 PRIMARY OSTEOARTHRITIS OF RIGHT HIP: ICD-10-CM

## 2019-10-11 LAB
ANION GAP SERPL CALCULATED.3IONS-SCNC: 12 MMOL/L (ref 7–16)
BILIRUBIN URINE: NEGATIVE
BLOOD, URINE: NEGATIVE
BUN BLDV-MCNC: 15 MG/DL (ref 8–23)
CALCIUM SERPL-MCNC: 9.3 MG/DL (ref 8.6–10.2)
CHLORIDE BLD-SCNC: 96 MMOL/L (ref 98–107)
CLARITY: CLEAR
CO2: 23 MMOL/L (ref 22–29)
COLOR: YELLOW
CREAT SERPL-MCNC: 0.8 MG/DL (ref 0.5–1)
EKG ATRIAL RATE: 77 BPM
EKG P AXIS: 10 DEGREES
EKG P-R INTERVAL: 154 MS
EKG Q-T INTERVAL: 392 MS
EKG QRS DURATION: 64 MS
EKG QTC CALCULATION (BAZETT): 443 MS
EKG R AXIS: -15 DEGREES
EKG T AXIS: 0 DEGREES
EKG VENTRICULAR RATE: 77 BPM
GFR AFRICAN AMERICAN: >60
GFR NON-AFRICAN AMERICAN: >60 ML/MIN/1.73
GLUCOSE BLD-MCNC: 104 MG/DL (ref 74–99)
GLUCOSE URINE: NEGATIVE MG/DL
HCT VFR BLD CALC: 42.3 % (ref 34–48)
HEMOGLOBIN: 13.8 G/DL (ref 11.5–15.5)
KETONES, URINE: NEGATIVE MG/DL
LEUKOCYTE ESTERASE, URINE: NEGATIVE
MCH RBC QN AUTO: 27.3 PG (ref 26–35)
MCHC RBC AUTO-ENTMCNC: 32.6 % (ref 32–34.5)
MCV RBC AUTO: 83.6 FL (ref 80–99.9)
NITRITE, URINE: NEGATIVE
PDW BLD-RTO: 14.4 FL (ref 11.5–15)
PH UA: 6.5 (ref 5–9)
PLATELET # BLD: 264 E9/L (ref 130–450)
PMV BLD AUTO: 9.9 FL (ref 7–12)
POTASSIUM SERPL-SCNC: 3.8 MMOL/L (ref 3.5–5)
PROTEIN UA: NEGATIVE MG/DL
RBC # BLD: 5.06 E12/L (ref 3.5–5.5)
SODIUM BLD-SCNC: 131 MMOL/L (ref 132–146)
SPECIFIC GRAVITY UA: 1.01 (ref 1–1.03)
UROBILINOGEN, URINE: 0.2 E.U./DL
WBC # BLD: 7.8 E9/L (ref 4.5–11.5)

## 2019-10-11 PROCEDURE — 87081 CULTURE SCREEN ONLY: CPT

## 2019-10-11 PROCEDURE — 93010 ELECTROCARDIOGRAM REPORT: CPT | Performed by: INTERNAL MEDICINE

## 2019-10-11 PROCEDURE — 81003 URINALYSIS AUTO W/O SCOPE: CPT

## 2019-10-11 PROCEDURE — 85027 COMPLETE CBC AUTOMATED: CPT

## 2019-10-11 PROCEDURE — 80048 BASIC METABOLIC PNL TOTAL CA: CPT

## 2019-10-11 PROCEDURE — 36415 COLL VENOUS BLD VENIPUNCTURE: CPT

## 2019-10-11 PROCEDURE — 93005 ELECTROCARDIOGRAM TRACING: CPT

## 2019-10-11 RX ORDER — ACETAMINOPHEN 500 MG
250 TABLET ORAL EVERY 4 HOURS PRN
COMMUNITY
End: 2020-11-08 | Stop reason: ALTCHOICE

## 2019-10-11 RX ORDER — TRAMADOL HYDROCHLORIDE 50 MG/1
50 TABLET ORAL EVERY 6 HOURS PRN
COMMUNITY
End: 2020-01-25

## 2019-10-11 RX ORDER — OMEPRAZOLE 10 MG/1
10 CAPSULE, DELAYED RELEASE ORAL DAILY
COMMUNITY
End: 2020-01-25

## 2019-10-11 ASSESSMENT — HOOS JR
RISING FROM SITTING: 3
BENDING TO THE FLOOR TO PICK UP OBJECT: 4
HOOS JR RAW SCORE: 23
GOING UP OR DOWN STAIRS: 4
WALKING ON UNEVEN SURFACE: 4
LYING IN BED (TURNING OVER, MAINTAINING HIP POSITION): 4
SITTING: 4

## 2019-10-11 ASSESSMENT — PAIN SCALES - GENERAL: PAINLEVEL_OUTOF10: 8

## 2019-10-11 ASSESSMENT — PAIN DESCRIPTION - PAIN TYPE: TYPE: CHRONIC PAIN

## 2019-10-11 ASSESSMENT — PAIN DESCRIPTION - LOCATION: LOCATION: HIP

## 2019-10-11 ASSESSMENT — PAIN DESCRIPTION - ORIENTATION: ORIENTATION: RIGHT

## 2019-10-12 LAB — ORGANISM: ABNORMAL

## 2019-10-15 ENCOUNTER — ANESTHESIA (OUTPATIENT)
Dept: OPERATING ROOM | Age: 84
DRG: 470 | End: 2019-10-15
Payer: MEDICARE

## 2019-10-15 ENCOUNTER — APPOINTMENT (OUTPATIENT)
Dept: GENERAL RADIOLOGY | Age: 84
DRG: 470 | End: 2019-10-15
Attending: ORTHOPAEDIC SURGERY
Payer: MEDICARE

## 2019-10-15 ENCOUNTER — HOSPITAL ENCOUNTER (INPATIENT)
Age: 84
LOS: 2 days | Discharge: NURSING, REHAB OR LONG-TERM CARE | DRG: 470 | End: 2019-10-17
Attending: ORTHOPAEDIC SURGERY | Admitting: ORTHOPAEDIC SURGERY
Payer: MEDICARE

## 2019-10-15 ENCOUNTER — ANESTHESIA EVENT (OUTPATIENT)
Dept: OPERATING ROOM | Age: 84
DRG: 470 | End: 2019-10-15
Payer: MEDICARE

## 2019-10-15 VITALS — DIASTOLIC BLOOD PRESSURE: 87 MMHG | SYSTOLIC BLOOD PRESSURE: 171 MMHG | OXYGEN SATURATION: 100 %

## 2019-10-15 PROBLEM — M16.11 PRIMARY OSTEOARTHRITIS OF RIGHT HIP: Status: ACTIVE | Noted: 2019-10-15

## 2019-10-15 PROCEDURE — 2500000003 HC RX 250 WO HCPCS: Performed by: ORTHOPAEDIC SURGERY

## 2019-10-15 PROCEDURE — 6360000002 HC RX W HCPCS: Performed by: ORTHOPAEDIC SURGERY

## 2019-10-15 PROCEDURE — P9045 ALBUMIN (HUMAN), 5%, 250 ML: HCPCS | Performed by: ANESTHESIOLOGY

## 2019-10-15 PROCEDURE — 2580000003 HC RX 258: Performed by: ORTHOPAEDIC SURGERY

## 2019-10-15 PROCEDURE — 3600000005 HC SURGERY LEVEL 5 BASE: Performed by: ORTHOPAEDIC SURGERY

## 2019-10-15 PROCEDURE — 73501 X-RAY EXAM HIP UNI 1 VIEW: CPT

## 2019-10-15 PROCEDURE — 0SR90JA REPLACEMENT OF RIGHT HIP JOINT WITH SYNTHETIC SUBSTITUTE, UNCEMENTED, OPEN APPROACH: ICD-10-PCS | Performed by: ORTHOPAEDIC SURGERY

## 2019-10-15 PROCEDURE — 7100000000 HC PACU RECOVERY - FIRST 15 MIN: Performed by: ORTHOPAEDIC SURGERY

## 2019-10-15 PROCEDURE — C1713 ANCHOR/SCREW BN/BN,TIS/BN: HCPCS | Performed by: ORTHOPAEDIC SURGERY

## 2019-10-15 PROCEDURE — 6360000002 HC RX W HCPCS: Performed by: NURSE ANESTHETIST, CERTIFIED REGISTERED

## 2019-10-15 PROCEDURE — 3700000000 HC ANESTHESIA ATTENDED CARE: Performed by: ORTHOPAEDIC SURGERY

## 2019-10-15 PROCEDURE — 6370000000 HC RX 637 (ALT 250 FOR IP): Performed by: ORTHOPAEDIC SURGERY

## 2019-10-15 PROCEDURE — 1200000000 HC SEMI PRIVATE

## 2019-10-15 PROCEDURE — 7100000001 HC PACU RECOVERY - ADDTL 15 MIN: Performed by: ORTHOPAEDIC SURGERY

## 2019-10-15 PROCEDURE — 2500000003 HC RX 250 WO HCPCS

## 2019-10-15 PROCEDURE — 6360000002 HC RX W HCPCS: Performed by: ANESTHESIOLOGY

## 2019-10-15 PROCEDURE — 88304 TISSUE EXAM BY PATHOLOGIST: CPT

## 2019-10-15 PROCEDURE — 2709999900 HC NON-CHARGEABLE SUPPLY: Performed by: ORTHOPAEDIC SURGERY

## 2019-10-15 PROCEDURE — 2500000003 HC RX 250 WO HCPCS: Performed by: PHYSICIAN ASSISTANT

## 2019-10-15 PROCEDURE — 2500000003 HC RX 250 WO HCPCS: Performed by: NURSE ANESTHETIST, CERTIFIED REGISTERED

## 2019-10-15 PROCEDURE — 3700000001 HC ADD 15 MINUTES (ANESTHESIA): Performed by: ORTHOPAEDIC SURGERY

## 2019-10-15 PROCEDURE — L8690 AUD OSSEO DEV, INT/EXT COMP: HCPCS | Performed by: ORTHOPAEDIC SURGERY

## 2019-10-15 PROCEDURE — 6370000000 HC RX 637 (ALT 250 FOR IP): Performed by: PHYSICIAN ASSISTANT

## 2019-10-15 PROCEDURE — C1776 JOINT DEVICE (IMPLANTABLE): HCPCS | Performed by: ORTHOPAEDIC SURGERY

## 2019-10-15 PROCEDURE — 2580000003 HC RX 258: Performed by: PHYSICIAN ASSISTANT

## 2019-10-15 PROCEDURE — 6360000002 HC RX W HCPCS: Performed by: PHYSICIAN ASSISTANT

## 2019-10-15 PROCEDURE — 3600000015 HC SURGERY LEVEL 5 ADDTL 15MIN: Performed by: ORTHOPAEDIC SURGERY

## 2019-10-15 PROCEDURE — 88311 DECALCIFY TISSUE: CPT

## 2019-10-15 DEVICE — TPRLC 133 FP TYPE1 PPS SO 7.0: Type: IMPLANTABLE DEVICE | Site: HIP | Status: FUNCTIONAL

## 2019-10-15 DEVICE — IMPLANTABLE DEVICE: Type: IMPLANTABLE DEVICE | Site: HIP | Status: FUNCTIONAL

## 2019-10-15 DEVICE — HEAD FEM DIA32MM -3MM OFFSET FEM HIP CO CHROM TYP 1 PRI MOD: Type: IMPLANTABLE DEVICE | Site: HIP | Status: FUNCTIONAL

## 2019-10-15 RX ORDER — SENNA AND DOCUSATE SODIUM 50; 8.6 MG/1; MG/1
2 TABLET, FILM COATED ORAL NIGHTLY
Status: DISCONTINUED | OUTPATIENT
Start: 2019-10-15 | End: 2019-10-17 | Stop reason: HOSPADM

## 2019-10-15 RX ORDER — GABAPENTIN 100 MG/1
300 CAPSULE ORAL ONCE
Status: COMPLETED | OUTPATIENT
Start: 2019-10-15 | End: 2019-10-15

## 2019-10-15 RX ORDER — ACETAMINOPHEN 325 MG/1
650 TABLET ORAL EVERY 6 HOURS
Status: DISCONTINUED | OUTPATIENT
Start: 2019-10-15 | End: 2019-10-17 | Stop reason: HOSPADM

## 2019-10-15 RX ORDER — LOSARTAN POTASSIUM 50 MG/1
50 TABLET ORAL DAILY
Status: DISCONTINUED | OUTPATIENT
Start: 2019-10-16 | End: 2019-10-17 | Stop reason: HOSPADM

## 2019-10-15 RX ORDER — TETRAHYDROZOLINE HCL 0.05 %
1 DROPS OPHTHALMIC (EYE) EVERY 4 HOURS PRN
Status: DISCONTINUED | OUTPATIENT
Start: 2019-10-15 | End: 2019-10-16 | Stop reason: CLARIF

## 2019-10-15 RX ORDER — BUPIVACAINE HYDROCHLORIDE 7.5 MG/ML
INJECTION, SOLUTION INTRASPINAL PRN
Status: DISCONTINUED | OUTPATIENT
Start: 2019-10-15 | End: 2019-10-15 | Stop reason: SDUPTHER

## 2019-10-15 RX ORDER — CELECOXIB 100 MG/1
100 CAPSULE ORAL ONCE
Status: COMPLETED | OUTPATIENT
Start: 2019-10-15 | End: 2019-10-15

## 2019-10-15 RX ORDER — TRIAMTERENE AND HYDROCHLOROTHIAZIDE 37.5; 25 MG/1; MG/1
1 TABLET ORAL EVERY MORNING
Status: DISCONTINUED | OUTPATIENT
Start: 2019-10-16 | End: 2019-10-17 | Stop reason: HOSPADM

## 2019-10-15 RX ORDER — SODIUM CHLORIDE 0.9 % (FLUSH) 0.9 %
10 SYRINGE (ML) INJECTION EVERY 12 HOURS SCHEDULED
Status: DISCONTINUED | OUTPATIENT
Start: 2019-10-15 | End: 2019-10-17 | Stop reason: HOSPADM

## 2019-10-15 RX ORDER — PROPOFOL 10 MG/ML
INJECTION, EMULSION INTRAVENOUS PRN
Status: DISCONTINUED | OUTPATIENT
Start: 2019-10-15 | End: 2019-10-15 | Stop reason: SDUPTHER

## 2019-10-15 RX ORDER — PHENYLEPHRINE HYDROCHLORIDE 10 MG/ML
INJECTION INTRAVENOUS PRN
Status: DISCONTINUED | OUTPATIENT
Start: 2019-10-15 | End: 2019-10-15 | Stop reason: SDUPTHER

## 2019-10-15 RX ORDER — FENTANYL CITRATE 50 UG/ML
INJECTION, SOLUTION INTRAMUSCULAR; INTRAVENOUS PRN
Status: DISCONTINUED | OUTPATIENT
Start: 2019-10-15 | End: 2019-10-15 | Stop reason: SDUPTHER

## 2019-10-15 RX ORDER — SODIUM CHLORIDE 0.9 % (FLUSH) 0.9 %
10 SYRINGE (ML) INJECTION EVERY 12 HOURS SCHEDULED
Status: DISCONTINUED | OUTPATIENT
Start: 2019-10-15 | End: 2019-10-15 | Stop reason: HOSPADM

## 2019-10-15 RX ORDER — TRAMADOL HYDROCHLORIDE 50 MG/1
50 TABLET ORAL EVERY 6 HOURS PRN
Status: DISCONTINUED | OUTPATIENT
Start: 2019-10-15 | End: 2019-10-16

## 2019-10-15 RX ORDER — PROMETHAZINE HYDROCHLORIDE 25 MG/ML
6.25 INJECTION, SOLUTION INTRAMUSCULAR; INTRAVENOUS
Status: DISCONTINUED | OUTPATIENT
Start: 2019-10-15 | End: 2019-10-15 | Stop reason: HOSPADM

## 2019-10-15 RX ORDER — SODIUM CHLORIDE 0.9 % (FLUSH) 0.9 %
10 SYRINGE (ML) INJECTION PRN
Status: DISCONTINUED | OUTPATIENT
Start: 2019-10-15 | End: 2019-10-15 | Stop reason: HOSPADM

## 2019-10-15 RX ORDER — ASPIRIN 81 MG/1
81 TABLET ORAL 2 TIMES DAILY
Status: DISCONTINUED | OUTPATIENT
Start: 2019-10-15 | End: 2019-10-17 | Stop reason: HOSPADM

## 2019-10-15 RX ORDER — PROPOFOL 10 MG/ML
INJECTION, EMULSION INTRAVENOUS CONTINUOUS PRN
Status: DISCONTINUED | OUTPATIENT
Start: 2019-10-15 | End: 2019-10-15 | Stop reason: SDUPTHER

## 2019-10-15 RX ORDER — ONDANSETRON 2 MG/ML
4 INJECTION INTRAMUSCULAR; INTRAVENOUS EVERY 6 HOURS PRN
Status: DISCONTINUED | OUTPATIENT
Start: 2019-10-15 | End: 2019-10-17 | Stop reason: HOSPADM

## 2019-10-15 RX ORDER — ALBUMIN, HUMAN INJ 5% 5 %
12.5 SOLUTION INTRAVENOUS ONCE
Status: COMPLETED | OUTPATIENT
Start: 2019-10-15 | End: 2019-10-15

## 2019-10-15 RX ORDER — CEFAZOLIN SODIUM 2 G/50ML
2 SOLUTION INTRAVENOUS EVERY 8 HOURS
Status: COMPLETED | OUTPATIENT
Start: 2019-10-15 | End: 2019-10-16

## 2019-10-15 RX ORDER — ACETAMINOPHEN 500 MG
1000 TABLET ORAL ONCE
Status: COMPLETED | OUTPATIENT
Start: 2019-10-15 | End: 2019-10-15

## 2019-10-15 RX ORDER — MORPHINE SULFATE 2 MG/ML
INJECTION, SOLUTION INTRAMUSCULAR; INTRAVENOUS
Status: DISCONTINUED
Start: 2019-10-15 | End: 2019-10-15

## 2019-10-15 RX ORDER — EPHEDRINE SULFATE/0.9% NACL/PF 50 MG/5 ML
SYRINGE (ML) INTRAVENOUS
Status: COMPLETED
Start: 2019-10-15 | End: 2019-10-15

## 2019-10-15 RX ORDER — DOCUSATE SODIUM 100 MG/1
100 CAPSULE, LIQUID FILLED ORAL 2 TIMES DAILY
Status: DISCONTINUED | OUTPATIENT
Start: 2019-10-15 | End: 2019-10-17 | Stop reason: HOSPADM

## 2019-10-15 RX ORDER — FAMOTIDINE 20 MG/1
20 TABLET, FILM COATED ORAL DAILY
Status: DISCONTINUED | OUTPATIENT
Start: 2019-10-16 | End: 2019-10-17 | Stop reason: HOSPADM

## 2019-10-15 RX ORDER — SODIUM CHLORIDE 0.9 % (FLUSH) 0.9 %
10 SYRINGE (ML) INJECTION PRN
Status: DISCONTINUED | OUTPATIENT
Start: 2019-10-15 | End: 2019-10-17 | Stop reason: HOSPADM

## 2019-10-15 RX ORDER — SODIUM CHLORIDE, SODIUM LACTATE, POTASSIUM CHLORIDE, CALCIUM CHLORIDE 600; 310; 30; 20 MG/100ML; MG/100ML; MG/100ML; MG/100ML
INJECTION, SOLUTION INTRAVENOUS CONTINUOUS
Status: DISCONTINUED | OUTPATIENT
Start: 2019-10-15 | End: 2019-10-17 | Stop reason: HOSPADM

## 2019-10-15 RX ORDER — VANCOMYCIN HYDROCHLORIDE 1 G/20ML
INJECTION, POWDER, LYOPHILIZED, FOR SOLUTION INTRAVENOUS PRN
Status: DISCONTINUED | OUTPATIENT
Start: 2019-10-15 | End: 2019-10-15 | Stop reason: ALTCHOICE

## 2019-10-15 RX ORDER — GLYCOPYRROLATE 1 MG/5 ML
SYRINGE (ML) INTRAVENOUS PRN
Status: DISCONTINUED | OUTPATIENT
Start: 2019-10-15 | End: 2019-10-15 | Stop reason: SDUPTHER

## 2019-10-15 RX ORDER — CEFAZOLIN SODIUM 2 G/50ML
2 SOLUTION INTRAVENOUS
Status: COMPLETED | OUTPATIENT
Start: 2019-10-15 | End: 2019-10-15

## 2019-10-15 RX ORDER — SODIUM CHLORIDE, SODIUM LACTATE, POTASSIUM CHLORIDE, CALCIUM CHLORIDE 600; 310; 30; 20 MG/100ML; MG/100ML; MG/100ML; MG/100ML
INJECTION, SOLUTION INTRAVENOUS CONTINUOUS
Status: DISCONTINUED | OUTPATIENT
Start: 2019-10-15 | End: 2019-10-15

## 2019-10-15 RX ADMIN — DOCUSATE SODIUM 100 MG: 100 CAPSULE, LIQUID FILLED ORAL at 22:24

## 2019-10-15 RX ADMIN — PHENYLEPHRINE HYDROCHLORIDE 100 MCG: 10 INJECTION INTRAVENOUS at 15:50

## 2019-10-15 RX ADMIN — PHENYLEPHRINE HYDROCHLORIDE 100 MCG: 10 INJECTION INTRAVENOUS at 16:12

## 2019-10-15 RX ADMIN — BUPIVACAINE HYDROCHLORIDE IN DEXTROSE 1.8 ML: 7.5 INJECTION, SOLUTION SUBARACHNOID at 15:45

## 2019-10-15 RX ADMIN — Medication 10 MG: at 18:20

## 2019-10-15 RX ADMIN — PHENYLEPHRINE HYDROCHLORIDE 100 MCG: 10 INJECTION INTRAVENOUS at 15:59

## 2019-10-15 RX ADMIN — ALBUMIN (HUMAN) 12.5 G: 12.5 INJECTION, SOLUTION INTRAVENOUS at 18:00

## 2019-10-15 RX ADMIN — MUPIROCIN: 20 OINTMENT TOPICAL at 22:35

## 2019-10-15 RX ADMIN — Medication 0.2 MG: at 16:09

## 2019-10-15 RX ADMIN — CELECOXIB 100 MG: 100 CAPSULE ORAL at 12:27

## 2019-10-15 RX ADMIN — PHENYLEPHRINE HYDROCHLORIDE 100 MCG: 10 INJECTION INTRAVENOUS at 15:55

## 2019-10-15 RX ADMIN — ACETAMINOPHEN 1000 MG: 500 TABLET ORAL at 12:27

## 2019-10-15 RX ADMIN — Medication 10 MG: at 18:15

## 2019-10-15 RX ADMIN — CEFAZOLIN SODIUM 2 G: 2 SOLUTION INTRAVENOUS at 22:33

## 2019-10-15 RX ADMIN — PROPOFOL 50 MCG/KG/MIN: 10 INJECTION, EMULSION INTRAVENOUS at 15:50

## 2019-10-15 RX ADMIN — PROPOFOL 50 MG: 10 INJECTION, EMULSION INTRAVENOUS at 15:47

## 2019-10-15 RX ADMIN — SODIUM CHLORIDE, POTASSIUM CHLORIDE, SODIUM LACTATE AND CALCIUM CHLORIDE: 600; 310; 30; 20 INJECTION, SOLUTION INTRAVENOUS at 22:27

## 2019-10-15 RX ADMIN — TRANEXAMIC ACID 1000 MG: 1 INJECTION, SOLUTION INTRAVENOUS at 18:50

## 2019-10-15 RX ADMIN — CEFAZOLIN SODIUM 2 G: 2 SOLUTION INTRAVENOUS at 15:38

## 2019-10-15 RX ADMIN — FENTANYL CITRATE 100 MCG: 50 INJECTION, SOLUTION INTRAMUSCULAR; INTRAVENOUS at 15:37

## 2019-10-15 RX ADMIN — Medication 10 ML: at 22:07

## 2019-10-15 RX ADMIN — SENNOSIDES AND DOCUSATE SODIUM 2 TABLET: 8.6; 5 TABLET ORAL at 22:24

## 2019-10-15 RX ADMIN — TRANEXAMIC ACID 1 G: 1 INJECTION, SOLUTION INTRAVENOUS at 15:46

## 2019-10-15 RX ADMIN — GABAPENTIN 300 MG: 100 CAPSULE ORAL at 12:27

## 2019-10-15 RX ADMIN — ACETAMINOPHEN 650 MG: 325 TABLET ORAL at 22:24

## 2019-10-15 RX ADMIN — ASPIRIN 81 MG: 81 TABLET, COATED ORAL at 22:24

## 2019-10-15 RX ADMIN — SODIUM CHLORIDE, POTASSIUM CHLORIDE, SODIUM LACTATE AND CALCIUM CHLORIDE: 600; 310; 30; 20 INJECTION, SOLUTION INTRAVENOUS at 15:38

## 2019-10-15 ASSESSMENT — PULMONARY FUNCTION TESTS
PIF_VALUE: 0
PIF_VALUE: 1
PIF_VALUE: 0
PIF_VALUE: 1
PIF_VALUE: 0
PIF_VALUE: 1
PIF_VALUE: 1
PIF_VALUE: 0
PIF_VALUE: 1
PIF_VALUE: 1
PIF_VALUE: 0
PIF_VALUE: 1
PIF_VALUE: 0
PIF_VALUE: 1
PIF_VALUE: 0
PIF_VALUE: 1
PIF_VALUE: 0
PIF_VALUE: 1
PIF_VALUE: 0
PIF_VALUE: 0
PIF_VALUE: 1
PIF_VALUE: 0
PIF_VALUE: 1
PIF_VALUE: 0
PIF_VALUE: 0
PIF_VALUE: 1
PIF_VALUE: 0
PIF_VALUE: 1
PIF_VALUE: 0
PIF_VALUE: 1
PIF_VALUE: 1
PIF_VALUE: 0
PIF_VALUE: 1
PIF_VALUE: 0
PIF_VALUE: 1
PIF_VALUE: 0
PIF_VALUE: 0
PIF_VALUE: 1
PIF_VALUE: 0
PIF_VALUE: 0
PIF_VALUE: 1
PIF_VALUE: 0
PIF_VALUE: 1
PIF_VALUE: 0
PIF_VALUE: 1
PIF_VALUE: 0
PIF_VALUE: 1
PIF_VALUE: 1
PIF_VALUE: 0
PIF_VALUE: 1
PIF_VALUE: 1
PIF_VALUE: 0
PIF_VALUE: 1
PIF_VALUE: 0
PIF_VALUE: 0
PIF_VALUE: 1
PIF_VALUE: 1
PIF_VALUE: 0
PIF_VALUE: 1
PIF_VALUE: 0
PIF_VALUE: 1
PIF_VALUE: 0

## 2019-10-15 ASSESSMENT — PAIN SCALES - GENERAL
PAINLEVEL_OUTOF10: 7
PAINLEVEL_OUTOF10: 0
PAINLEVEL_OUTOF10: 3
PAINLEVEL_OUTOF10: 0

## 2019-10-15 ASSESSMENT — PAIN DESCRIPTION - ORIENTATION
ORIENTATION: RIGHT

## 2019-10-15 ASSESSMENT — PAIN DESCRIPTION - PAIN TYPE
TYPE: SURGICAL PAIN

## 2019-10-15 ASSESSMENT — PAIN DESCRIPTION - LOCATION
LOCATION: HIP

## 2019-10-15 ASSESSMENT — PAIN - FUNCTIONAL ASSESSMENT: PAIN_FUNCTIONAL_ASSESSMENT: 0-10

## 2019-10-15 ASSESSMENT — PAIN DESCRIPTION - DESCRIPTORS: DESCRIPTORS: ACHING;SHARP

## 2019-10-16 LAB
ANION GAP SERPL CALCULATED.3IONS-SCNC: 13 MMOL/L (ref 7–16)
BUN BLDV-MCNC: 17 MG/DL (ref 8–23)
CALCIUM SERPL-MCNC: 8.5 MG/DL (ref 8.6–10.2)
CHLORIDE BLD-SCNC: 98 MMOL/L (ref 98–107)
CO2: 20 MMOL/L (ref 22–29)
CREAT SERPL-MCNC: 0.8 MG/DL (ref 0.5–1)
GFR AFRICAN AMERICAN: >60
GFR NON-AFRICAN AMERICAN: >60 ML/MIN/1.73
GLUCOSE BLD-MCNC: 186 MG/DL (ref 74–99)
HCT VFR BLD CALC: 32.2 % (ref 34–48)
HEMOGLOBIN: 10.7 G/DL (ref 11.5–15.5)
MCH RBC QN AUTO: 27.9 PG (ref 26–35)
MCHC RBC AUTO-ENTMCNC: 33.2 % (ref 32–34.5)
MCV RBC AUTO: 83.9 FL (ref 80–99.9)
PDW BLD-RTO: 13.8 FL (ref 11.5–15)
PLATELET # BLD: 186 E9/L (ref 130–450)
PMV BLD AUTO: 9.7 FL (ref 7–12)
POTASSIUM SERPL-SCNC: 4 MMOL/L (ref 3.5–5)
RBC # BLD: 3.84 E12/L (ref 3.5–5.5)
SODIUM BLD-SCNC: 131 MMOL/L (ref 132–146)
WBC # BLD: 10.8 E9/L (ref 4.5–11.5)

## 2019-10-16 PROCEDURE — 85027 COMPLETE CBC AUTOMATED: CPT

## 2019-10-16 PROCEDURE — 1200000000 HC SEMI PRIVATE

## 2019-10-16 PROCEDURE — 97161 PT EVAL LOW COMPLEX 20 MIN: CPT

## 2019-10-16 PROCEDURE — 36415 COLL VENOUS BLD VENIPUNCTURE: CPT

## 2019-10-16 PROCEDURE — 6370000000 HC RX 637 (ALT 250 FOR IP): Performed by: ORTHOPAEDIC SURGERY

## 2019-10-16 PROCEDURE — 80048 BASIC METABOLIC PNL TOTAL CA: CPT

## 2019-10-16 PROCEDURE — 2580000003 HC RX 258: Performed by: ORTHOPAEDIC SURGERY

## 2019-10-16 PROCEDURE — 6360000002 HC RX W HCPCS: Performed by: ORTHOPAEDIC SURGERY

## 2019-10-16 PROCEDURE — 97165 OT EVAL LOW COMPLEX 30 MIN: CPT

## 2019-10-16 PROCEDURE — 6370000000 HC RX 637 (ALT 250 FOR IP): Performed by: PHYSICIAN ASSISTANT

## 2019-10-16 PROCEDURE — 2700000000 HC OXYGEN THERAPY PER DAY

## 2019-10-16 PROCEDURE — 97535 SELF CARE MNGMENT TRAINING: CPT

## 2019-10-16 PROCEDURE — 97530 THERAPEUTIC ACTIVITIES: CPT

## 2019-10-16 RX ORDER — CALCIUM CARBONATE 200(500)MG
500 TABLET,CHEWABLE ORAL 3 TIMES DAILY PRN
Status: DISCONTINUED | OUTPATIENT
Start: 2019-10-16 | End: 2019-10-17 | Stop reason: HOSPADM

## 2019-10-16 RX ADMIN — DOCUSATE SODIUM 100 MG: 100 CAPSULE, LIQUID FILLED ORAL at 21:24

## 2019-10-16 RX ADMIN — LOSARTAN POTASSIUM 50 MG: 50 TABLET ORAL at 08:14

## 2019-10-16 RX ADMIN — ACETAMINOPHEN 650 MG: 325 TABLET ORAL at 16:29

## 2019-10-16 RX ADMIN — DOCUSATE SODIUM 100 MG: 100 CAPSULE, LIQUID FILLED ORAL at 08:14

## 2019-10-16 RX ADMIN — ACETAMINOPHEN 650 MG: 325 TABLET ORAL at 02:55

## 2019-10-16 RX ADMIN — CALCIUM CARBONATE 500 MG: 500 TABLET, CHEWABLE ORAL at 16:50

## 2019-10-16 RX ADMIN — MUPIROCIN: 20 OINTMENT TOPICAL at 16:29

## 2019-10-16 RX ADMIN — SENNOSIDES AND DOCUSATE SODIUM 2 TABLET: 8.6; 5 TABLET ORAL at 21:24

## 2019-10-16 RX ADMIN — ACETAMINOPHEN 650 MG: 325 TABLET ORAL at 08:21

## 2019-10-16 RX ADMIN — MUPIROCIN: 20 OINTMENT TOPICAL at 21:25

## 2019-10-16 RX ADMIN — TRIAMTERENE AND HYDROCHLOROTHIAZIDE 1 TABLET: 37.5; 25 TABLET ORAL at 08:14

## 2019-10-16 RX ADMIN — MUPIROCIN: 20 OINTMENT TOPICAL at 08:14

## 2019-10-16 RX ADMIN — FAMOTIDINE 20 MG: 20 TABLET, FILM COATED ORAL at 08:14

## 2019-10-16 RX ADMIN — Medication 10 ML: at 22:02

## 2019-10-16 RX ADMIN — CEFAZOLIN SODIUM 2 G: 2 SOLUTION INTRAVENOUS at 07:25

## 2019-10-16 RX ADMIN — ACETAMINOPHEN 650 MG: 325 TABLET ORAL at 21:24

## 2019-10-16 RX ADMIN — ASPIRIN 81 MG: 81 TABLET, COATED ORAL at 08:14

## 2019-10-16 RX ADMIN — ASPIRIN 81 MG: 81 TABLET, COATED ORAL at 21:24

## 2019-10-16 RX ADMIN — VITAMIN D, TAB 1000IU (100/BT) 2000 UNITS: 25 TAB at 08:14

## 2019-10-16 ASSESSMENT — PAIN DESCRIPTION - ORIENTATION: ORIENTATION: RIGHT

## 2019-10-16 ASSESSMENT — PAIN SCALES - GENERAL
PAINLEVEL_OUTOF10: 4
PAINLEVEL_OUTOF10: 4
PAINLEVEL_OUTOF10: 1
PAINLEVEL_OUTOF10: 5

## 2019-10-16 ASSESSMENT — PAIN DESCRIPTION - PAIN TYPE: TYPE: SURGICAL PAIN

## 2019-10-16 ASSESSMENT — PAIN DESCRIPTION - ONSET: ONSET: GRADUAL

## 2019-10-16 ASSESSMENT — PAIN DESCRIPTION - FREQUENCY: FREQUENCY: INTERMITTENT

## 2019-10-16 ASSESSMENT — PAIN DESCRIPTION - DESCRIPTORS: DESCRIPTORS: ACHING;DISCOMFORT;DULL

## 2019-10-16 ASSESSMENT — PAIN - FUNCTIONAL ASSESSMENT: PAIN_FUNCTIONAL_ASSESSMENT: PREVENTS OR INTERFERES SOME ACTIVE ACTIVITIES AND ADLS

## 2019-10-16 ASSESSMENT — PAIN DESCRIPTION - LOCATION: LOCATION: HIP

## 2019-10-17 VITALS
HEART RATE: 78 BPM | DIASTOLIC BLOOD PRESSURE: 64 MMHG | HEIGHT: 61 IN | BODY MASS INDEX: 29.64 KG/M2 | TEMPERATURE: 98.1 F | OXYGEN SATURATION: 96 % | SYSTOLIC BLOOD PRESSURE: 145 MMHG | RESPIRATION RATE: 16 BRPM | WEIGHT: 157 LBS

## 2019-10-17 LAB
ANION GAP SERPL CALCULATED.3IONS-SCNC: 12 MMOL/L (ref 7–16)
BUN BLDV-MCNC: 22 MG/DL (ref 8–23)
CALCIUM SERPL-MCNC: 9.3 MG/DL (ref 8.6–10.2)
CHLORIDE BLD-SCNC: 98 MMOL/L (ref 98–107)
CO2: 21 MMOL/L (ref 22–29)
CREAT SERPL-MCNC: 0.8 MG/DL (ref 0.5–1)
GFR AFRICAN AMERICAN: >60
GFR NON-AFRICAN AMERICAN: >60 ML/MIN/1.73
GLUCOSE BLD-MCNC: 125 MG/DL (ref 74–99)
HCT VFR BLD CALC: 33.6 % (ref 34–48)
HEMOGLOBIN: 10.9 G/DL (ref 11.5–15.5)
MCH RBC QN AUTO: 27.1 PG (ref 26–35)
MCHC RBC AUTO-ENTMCNC: 32.4 % (ref 32–34.5)
MCV RBC AUTO: 83.6 FL (ref 80–99.9)
PDW BLD-RTO: 14.3 FL (ref 11.5–15)
PLATELET # BLD: 225 E9/L (ref 130–450)
PMV BLD AUTO: 10 FL (ref 7–12)
POTASSIUM SERPL-SCNC: 4.3 MMOL/L (ref 3.5–5)
RBC # BLD: 4.02 E12/L (ref 3.5–5.5)
SODIUM BLD-SCNC: 131 MMOL/L (ref 132–146)
WBC # BLD: 16.1 E9/L (ref 4.5–11.5)

## 2019-10-17 PROCEDURE — 97110 THERAPEUTIC EXERCISES: CPT

## 2019-10-17 PROCEDURE — 36415 COLL VENOUS BLD VENIPUNCTURE: CPT

## 2019-10-17 PROCEDURE — 80048 BASIC METABOLIC PNL TOTAL CA: CPT

## 2019-10-17 PROCEDURE — 97530 THERAPEUTIC ACTIVITIES: CPT

## 2019-10-17 PROCEDURE — 97535 SELF CARE MNGMENT TRAINING: CPT

## 2019-10-17 PROCEDURE — 2580000003 HC RX 258: Performed by: ORTHOPAEDIC SURGERY

## 2019-10-17 PROCEDURE — 85027 COMPLETE CBC AUTOMATED: CPT

## 2019-10-17 PROCEDURE — 6370000000 HC RX 637 (ALT 250 FOR IP): Performed by: ORTHOPAEDIC SURGERY

## 2019-10-17 RX ORDER — SENNA AND DOCUSATE SODIUM 50; 8.6 MG/1; MG/1
2 TABLET, FILM COATED ORAL NIGHTLY
Qty: 60 TABLET | Refills: 0 | Status: SHIPPED | OUTPATIENT
Start: 2019-10-17 | End: 2019-11-16

## 2019-10-17 RX ORDER — ASPIRIN 81 MG/1
81 TABLET ORAL 2 TIMES DAILY
Qty: 30 TABLET | Refills: 3 | Status: SHIPPED | OUTPATIENT
Start: 2019-10-17 | End: 2022-08-22 | Stop reason: ALTCHOICE

## 2019-10-17 RX ADMIN — MUPIROCIN: 20 OINTMENT TOPICAL at 09:25

## 2019-10-17 RX ADMIN — ACETAMINOPHEN 650 MG: 325 TABLET ORAL at 15:38

## 2019-10-17 RX ADMIN — ACETAMINOPHEN 650 MG: 325 TABLET ORAL at 09:30

## 2019-10-17 RX ADMIN — TRIAMTERENE AND HYDROCHLOROTHIAZIDE 1 TABLET: 37.5; 25 TABLET ORAL at 09:26

## 2019-10-17 RX ADMIN — VITAMIN D, TAB 1000IU (100/BT) 2000 UNITS: 25 TAB at 09:26

## 2019-10-17 RX ADMIN — FAMOTIDINE 20 MG: 20 TABLET, FILM COATED ORAL at 09:26

## 2019-10-17 RX ADMIN — ACETAMINOPHEN 650 MG: 325 TABLET ORAL at 04:36

## 2019-10-17 RX ADMIN — DOCUSATE SODIUM 100 MG: 100 CAPSULE, LIQUID FILLED ORAL at 09:26

## 2019-10-17 RX ADMIN — Medication 10 ML: at 09:27

## 2019-10-17 RX ADMIN — ASPIRIN 81 MG: 81 TABLET, COATED ORAL at 09:26

## 2019-10-17 RX ADMIN — LOSARTAN POTASSIUM 50 MG: 50 TABLET ORAL at 09:26

## 2019-10-17 RX ADMIN — MUPIROCIN: 20 OINTMENT TOPICAL at 14:23

## 2019-10-17 ASSESSMENT — PAIN DESCRIPTION - ONSET
ONSET: ON-GOING
ONSET: ON-GOING

## 2019-10-17 ASSESSMENT — PAIN SCALES - GENERAL
PAINLEVEL_OUTOF10: 0
PAINLEVEL_OUTOF10: 5
PAINLEVEL_OUTOF10: 4
PAINLEVEL_OUTOF10: 5

## 2019-10-17 ASSESSMENT — PAIN DESCRIPTION - DESCRIPTORS
DESCRIPTORS: ACHING;DISCOMFORT;DULL
DESCRIPTORS: ACHING;DISCOMFORT;DULL

## 2019-10-17 ASSESSMENT — PAIN DESCRIPTION - LOCATION
LOCATION: HIP
LOCATION: HIP

## 2019-10-17 ASSESSMENT — PAIN DESCRIPTION - PAIN TYPE
TYPE: ACUTE PAIN;SURGICAL PAIN
TYPE: ACUTE PAIN;SURGICAL PAIN

## 2019-10-17 ASSESSMENT — PAIN DESCRIPTION - PROGRESSION
CLINICAL_PROGRESSION: GRADUALLY WORSENING
CLINICAL_PROGRESSION: GRADUALLY WORSENING

## 2019-10-17 ASSESSMENT — PAIN - FUNCTIONAL ASSESSMENT
PAIN_FUNCTIONAL_ASSESSMENT: PREVENTS OR INTERFERES SOME ACTIVE ACTIVITIES AND ADLS
PAIN_FUNCTIONAL_ASSESSMENT: PREVENTS OR INTERFERES SOME ACTIVE ACTIVITIES AND ADLS

## 2019-10-17 ASSESSMENT — PAIN DESCRIPTION - FREQUENCY
FREQUENCY: CONTINUOUS
FREQUENCY: CONTINUOUS

## 2019-10-17 ASSESSMENT — PAIN DESCRIPTION - ORIENTATION
ORIENTATION: RIGHT
ORIENTATION: RIGHT

## 2019-12-03 ENCOUNTER — NURSE ONLY (OUTPATIENT)
Dept: NON INVASIVE DIAGNOSTICS | Age: 84
End: 2019-12-03
Payer: MEDICARE

## 2019-12-03 DIAGNOSIS — Z95.0 PACEMAKER: ICD-10-CM

## 2019-12-03 DIAGNOSIS — Z95.0 CARDIAC PACEMAKER IN SITU: Primary | ICD-10-CM

## 2019-12-03 PROCEDURE — 93296 REM INTERROG EVL PM/IDS: CPT | Performed by: INTERNAL MEDICINE

## 2019-12-03 PROCEDURE — 93294 REM INTERROG EVL PM/LDLS PM: CPT | Performed by: INTERNAL MEDICINE

## 2019-12-06 ENCOUNTER — TELEPHONE (OUTPATIENT)
Dept: NON INVASIVE DIAGNOSTICS | Age: 84
End: 2019-12-06

## 2020-01-23 ENCOUNTER — HOSPITAL ENCOUNTER (EMERGENCY)
Age: 85
Discharge: HOME | End: 2020-01-23
Attending: FAMILY MEDICINE
Payer: MEDICARE

## 2020-01-23 VITALS
SYSTOLIC BLOOD PRESSURE: 128 MMHG | RESPIRATION RATE: 18 BRPM | HEIGHT: 61 IN | TEMPERATURE: 98.1 F | BODY MASS INDEX: 28.89 KG/M2 | WEIGHT: 153 LBS | DIASTOLIC BLOOD PRESSURE: 84 MMHG | HEART RATE: 104 BPM | OXYGEN SATURATION: 95 %

## 2020-01-23 LAB
BACTERIA: ABNORMAL /HPF
BILIRUBIN URINE: NEGATIVE
BLOOD, URINE: ABNORMAL
CLARITY: ABNORMAL
COLOR: YELLOW
EPITHELIAL CELLS, UA: ABNORMAL /HPF
GLUCOSE URINE: NEGATIVE MG/DL
KETONES, URINE: NEGATIVE MG/DL
LEUKOCYTE ESTERASE, URINE: ABNORMAL
NITRITE, URINE: POSITIVE
PH UA: 5.5 (ref 5–9)
PROTEIN UA: 30 MG/DL
RBC UA: ABNORMAL /HPF (ref 0–2)
SPECIFIC GRAVITY UA: 1.01 (ref 1–1.03)
UROBILINOGEN, URINE: 0.2 E.U./DL
WBC UA: ABNORMAL /HPF (ref 0–5)

## 2020-01-23 PROCEDURE — 99284 EMERGENCY DEPT VISIT MOD MDM: CPT

## 2020-01-23 PROCEDURE — 81001 URINALYSIS AUTO W/SCOPE: CPT

## 2020-01-23 PROCEDURE — 87077 CULTURE AEROBIC IDENTIFY: CPT

## 2020-01-23 PROCEDURE — 87088 URINE BACTERIA CULTURE: CPT

## 2020-01-23 PROCEDURE — 87186 SC STD MICRODIL/AGAR DIL: CPT

## 2020-01-23 RX ORDER — CEFDINIR 300 MG/1
300 CAPSULE ORAL 2 TIMES DAILY
Qty: 14 CAPSULE | Refills: 0 | Status: SHIPPED | OUTPATIENT
Start: 2020-01-23 | End: 2020-01-25 | Stop reason: ALTCHOICE

## 2020-01-23 NOTE — ED PROVIDER NOTES
hip 2012    KYPHOSIS SURGERY      PACEMAKER PLACEMENT  6-    [facility] dual chamber - Dr. Delano Youssefats Right 10/15/2019    RIGHT HIP TOTAL ARTHROPLASTY performed by Shyanne Zheng MD at [place] [address] History:  reports that she quit smoking about 37 years ago. Her smoking use included cigarettes. She started smoking about 54 years ago. She has a 17.00 pack-year smoking history. She has never used smokeless tobacco. She reports that she does not drink alcohol or use drugs. Family History: family history includes Asthma in her father; Heart Attack in her mother; Heart Disease in her mother; Other in her father. Allergies: Codeine; Amlodipine; Augmentin [amoxicillin-pot clavulanate]; Bactrim; Benadryl [diphenhydramine]; Brovana [arformoterol]; Cardizem [diltiazem hcl]; Doxazosin; Ipratropium; Macrodantin [nitrofurantoin macrocrystal]; and Verapamil    Physical Exam            ED Triage Vitals   BP Temp Temp src Pulse Resp SpO2 Height Weight   -- -- -- -- -- -- -- --      Oxygen Saturation Interpretation: Normal.    · Constitutional:  Alert, development consistent with age. · HEENT:  NC/NT. Airway patent. · Neck:  Normal ROM. Supple. · Respiratory:  Lungs Clear to auscultation and breath sounds equal.  · CV:  Regular rate and rhythm, normal heart sounds, without pathological murmurs, ectopy, gallops, or rubs. · GI:  General Appearance: normal.         Bowel sounds: normal bowel sounds. Distension:  None. Tenderness: No abdominal tenderness. Liver: non-tender. Spleen:  non-tender. Pulsatile Mass: absent. Hernia:  no inguinal or femoral hernias noted. · : (chaperone present during examination). not indicated / deferred. · Back: CVA Tenderness: No.  · Integument:  Normal turgor. Warm, dry, without visible rash, unless noted elsewhere.   Lymphatics: No lymphangitis or adenopathy Maria Guadalupe Reardon MD   DD: 1/23/20  **This report was transcribed using voice recognition software. Every effort was made to ensure accuracy; however, inadvertent computerized transcription errors may be present. END OF ED PROVIDER NOTE      .       Maria Guadalupe Reardon MD  01/23/20 1100

## 2020-01-25 ENCOUNTER — HOSPITAL ENCOUNTER (EMERGENCY)
Age: 85
Discharge: HOME | End: 2020-01-25
Attending: EMERGENCY MEDICINE
Payer: MEDICARE

## 2020-01-25 ENCOUNTER — APPOINTMENT (OUTPATIENT)
Dept: CT IMAGING | Age: 85
End: 2020-01-25
Payer: MEDICARE

## 2020-01-25 VITALS
WEIGHT: 153 LBS | DIASTOLIC BLOOD PRESSURE: 59 MMHG | BODY MASS INDEX: 28.91 KG/M2 | TEMPERATURE: 96.6 F | OXYGEN SATURATION: 98 % | HEART RATE: 80 BPM | RESPIRATION RATE: 18 BRPM | SYSTOLIC BLOOD PRESSURE: 117 MMHG

## 2020-01-25 LAB
ALBUMIN SERPL-MCNC: 3.8 G/DL (ref 3.5–5.2)
ALP BLD-CCNC: 120 U/L (ref 35–104)
ALT SERPL-CCNC: 21 U/L (ref 0–32)
ANION GAP SERPL CALCULATED.3IONS-SCNC: 16 MMOL/L (ref 7–16)
APTT: 23.1 SEC (ref 24.5–35.1)
AST SERPL-CCNC: 27 U/L (ref 0–31)
BACTERIA: ABNORMAL /HPF
BASOPHILS ABSOLUTE: 0.04 E9/L (ref 0–0.2)
BASOPHILS RELATIVE PERCENT: 0.4 % (ref 0–2)
BILIRUB SERPL-MCNC: 0.4 MG/DL (ref 0–1.2)
BILIRUBIN URINE: NEGATIVE
BLOOD, URINE: ABNORMAL
BUN BLDV-MCNC: 34 MG/DL (ref 8–23)
CALCIUM SERPL-MCNC: 9.6 MG/DL (ref 8.6–10.2)
CHLORIDE BLD-SCNC: 96 MMOL/L (ref 98–107)
CLARITY: ABNORMAL
CO2: 17 MMOL/L (ref 22–29)
COLOR: YELLOW
CREAT SERPL-MCNC: 1.3 MG/DL (ref 0.5–1)
EOSINOPHILS ABSOLUTE: 0.09 E9/L (ref 0.05–0.5)
EOSINOPHILS RELATIVE PERCENT: 0.9 % (ref 0–6)
GFR AFRICAN AMERICAN: 43
GFR AFRICAN AMERICAN: 47
GFR NON-AFRICAN AMERICAN: 36 ML/MIN/1.73
GFR NON-AFRICAN AMERICAN: 39 ML/MIN/1.73
GLUCOSE BLD-MCNC: 181 MG/DL (ref 74–99)
GLUCOSE BLD-MCNC: 189 MG/DL (ref 74–99)
GLUCOSE URINE: NEGATIVE MG/DL
HCT VFR BLD CALC: 40.4 % (ref 34–48)
HEMOGLOBIN: 12.6 G/DL (ref 11.5–15.5)
IMMATURE GRANULOCYTES #: 0.05 E9/L
IMMATURE GRANULOCYTES %: 0.5 % (ref 0–5)
INR BLD: 1
KETONES, URINE: NEGATIVE MG/DL
LACTIC ACID: 1.9 MMOL/L (ref 0.5–2.2)
LEUKOCYTE ESTERASE, URINE: ABNORMAL
LYMPHOCYTES ABSOLUTE: 1.02 E9/L (ref 1.5–4)
LYMPHOCYTES RELATIVE PERCENT: 10.4 % (ref 20–42)
MCH RBC QN AUTO: 24.8 PG (ref 26–35)
MCHC RBC AUTO-ENTMCNC: 31.2 % (ref 32–34.5)
MCV RBC AUTO: 79.4 FL (ref 80–99.9)
MONOCYTES ABSOLUTE: 0.97 E9/L (ref 0.1–0.95)
MONOCYTES RELATIVE PERCENT: 9.9 % (ref 2–12)
NEUTROPHILS ABSOLUTE: 7.67 E9/L (ref 1.8–7.3)
NEUTROPHILS RELATIVE PERCENT: 77.9 % (ref 43–80)
NITRITE, URINE: NEGATIVE
ORGANISM: ABNORMAL
PDW BLD-RTO: 14.1 FL (ref 11.5–15)
PERFORMED ON: ABNORMAL
PH UA: 6 (ref 5–9)
PLATELET # BLD: 312 E9/L (ref 130–450)
PMV BLD AUTO: 9.9 FL (ref 7–12)
POC CHLORIDE: 102 MMOL/L (ref 100–108)
POC CREATININE: 1.4 MG/DL (ref 0.5–1)
POC POTASSIUM: 3.8 MMOL/L (ref 3.5–5)
POC SODIUM: 133 MMOL/L (ref 132–146)
POTASSIUM REFLEX MAGNESIUM: 3.9 MMOL/L (ref 3.5–5)
PRO-BNP: 158 PG/ML (ref 0–450)
PROTEIN UA: ABNORMAL MG/DL
PROTHROMBIN TIME: 11.2 SEC (ref 9.3–12.4)
RBC # BLD: 5.09 E12/L (ref 3.5–5.5)
RBC UA: ABNORMAL /HPF (ref 0–2)
SODIUM BLD-SCNC: 129 MMOL/L (ref 132–146)
SPECIFIC GRAVITY UA: 1.02 (ref 1–1.03)
TOTAL PROTEIN: 7.3 G/DL (ref 6.4–8.3)
TROPONIN: <0.01 NG/ML (ref 0–0.03)
URINE CULTURE, ROUTINE: ABNORMAL
UROBILINOGEN, URINE: 0.2 E.U./DL
WBC # BLD: 9.8 E9/L (ref 4.5–11.5)
WBC UA: >20 /HPF (ref 0–5)

## 2020-01-25 PROCEDURE — 87186 SC STD MICRODIL/AGAR DIL: CPT

## 2020-01-25 PROCEDURE — 99285 EMERGENCY DEPT VISIT HI MDM: CPT

## 2020-01-25 PROCEDURE — 2580000003 HC RX 258: Performed by: EMERGENCY MEDICINE

## 2020-01-25 PROCEDURE — 87077 CULTURE AEROBIC IDENTIFY: CPT

## 2020-01-25 PROCEDURE — 83605 ASSAY OF LACTIC ACID: CPT

## 2020-01-25 PROCEDURE — 82565 ASSAY OF CREATININE: CPT

## 2020-01-25 PROCEDURE — 82947 ASSAY GLUCOSE BLOOD QUANT: CPT

## 2020-01-25 PROCEDURE — 96374 THER/PROPH/DIAG INJ IV PUSH: CPT

## 2020-01-25 PROCEDURE — 80053 COMPREHEN METABOLIC PANEL: CPT

## 2020-01-25 PROCEDURE — 85610 PROTHROMBIN TIME: CPT

## 2020-01-25 PROCEDURE — 82435 ASSAY OF BLOOD CHLORIDE: CPT

## 2020-01-25 PROCEDURE — 6360000002 HC RX W HCPCS: Performed by: EMERGENCY MEDICINE

## 2020-01-25 PROCEDURE — 84295 ASSAY OF SERUM SODIUM: CPT

## 2020-01-25 PROCEDURE — 93005 ELECTROCARDIOGRAM TRACING: CPT | Performed by: EMERGENCY MEDICINE

## 2020-01-25 PROCEDURE — 84484 ASSAY OF TROPONIN QUANT: CPT

## 2020-01-25 PROCEDURE — 84132 ASSAY OF SERUM POTASSIUM: CPT

## 2020-01-25 PROCEDURE — 85025 COMPLETE CBC W/AUTO DIFF WBC: CPT

## 2020-01-25 PROCEDURE — 71275 CT ANGIOGRAPHY CHEST: CPT

## 2020-01-25 PROCEDURE — 6360000004 HC RX CONTRAST MEDICATION: Performed by: RADIOLOGY

## 2020-01-25 PROCEDURE — 85730 THROMBOPLASTIN TIME PARTIAL: CPT

## 2020-01-25 PROCEDURE — 81001 URINALYSIS AUTO W/SCOPE: CPT

## 2020-01-25 PROCEDURE — 87088 URINE BACTERIA CULTURE: CPT

## 2020-01-25 PROCEDURE — 2580000003 HC RX 258: Performed by: RADIOLOGY

## 2020-01-25 PROCEDURE — 36415 COLL VENOUS BLD VENIPUNCTURE: CPT

## 2020-01-25 PROCEDURE — 83880 ASSAY OF NATRIURETIC PEPTIDE: CPT

## 2020-01-25 RX ORDER — CALCIUM CARBONATE 200(500)MG
1 TABLET,CHEWABLE ORAL DAILY PRN
COMMUNITY
End: 2022-08-18

## 2020-01-25 RX ORDER — AMOXICILLIN 500 MG/1
500 CAPSULE ORAL 2 TIMES DAILY
Qty: 14 CAPSULE | Refills: 0 | Status: SHIPPED | OUTPATIENT
Start: 2020-01-25 | End: 2020-02-01

## 2020-01-25 RX ORDER — ONDANSETRON 2 MG/ML
4 INJECTION INTRAMUSCULAR; INTRAVENOUS ONCE
Status: COMPLETED | OUTPATIENT
Start: 2020-01-25 | End: 2020-01-25

## 2020-01-25 RX ORDER — SODIUM CHLORIDE 0.9 % (FLUSH) 0.9 %
10 SYRINGE (ML) INJECTION ONCE
Status: COMPLETED | OUTPATIENT
Start: 2020-01-25 | End: 2020-01-25

## 2020-01-25 RX ORDER — 0.9 % SODIUM CHLORIDE 0.9 %
1000 INTRAVENOUS SOLUTION INTRAVENOUS ONCE
Status: COMPLETED | OUTPATIENT
Start: 2020-01-25 | End: 2020-01-25

## 2020-01-25 RX ADMIN — IOPAMIDOL 60 ML: 755 INJECTION, SOLUTION INTRAVENOUS at 18:06

## 2020-01-25 RX ADMIN — SODIUM CHLORIDE 1000 ML: 9 INJECTION, SOLUTION INTRAVENOUS at 16:30

## 2020-01-25 RX ADMIN — ONDANSETRON 4 MG: 2 INJECTION INTRAMUSCULAR; INTRAVENOUS at 18:57

## 2020-01-25 RX ADMIN — Medication 10 ML: at 18:06

## 2020-01-25 NOTE — ED NOTES
Radiology Procedure Waiver   Name: Efraín Briceño  : 1935  MRN: [id number]    Date:  20    Time: 5:01 PM    Benefits of immediately proceeding with Radiology exam(s) without pre-testing outweigh the risks or are not indicated as specified below and therefore the following is/are being waived:    [] Pregnancy test   [] Patients LMP on-time and regular.   [] Patient had Tubal Ligation or has other Contraception Device. [] Patient  is Menopausal or Premenarcheal.    [] Patient had Full or Partial Hysterectomy. [] Protocol for Iodine allergy    [] MRI Questionnaire     [x] BUN/Creatinine   [] Patient age w/no hx of renal dysfunction. [] Patient on Dialysis. [] Recent Normal Labs. Electronically signed by Ajit Givens DO on 20 at 5:01 PM          Patient is high risk for PE and the benefits outweigh the risks of obtaining the CTA given her creatinine of 1.4.      Ajit Givens DO  20 0931

## 2020-01-25 NOTE — ED PROVIDER NOTES
0. 04 0.00 - 0.20 E9/L   Comprehensive Metabolic Panel w/ Reflex to MG   Result Value Ref Range    Sodium 129 (L) 132 - 146 mmol/L    Potassium reflex Magnesium 3.9 3.5 - 5.0 mmol/L    Chloride 96 (L) 98 - 107 mmol/L    CO2 17 (L) 22 - 29 mmol/L    Anion Gap 16 7 - 16 mmol/L    Glucose 189 (H) 74 - 99 mg/dL    BUN 34 (H) 8 - 23 mg/dL    CREATININE 1.3 (H) 0.5 - 1.0 mg/dL    GFR Non-African American 39 >=60 mL/min/1.73    GFR African American 47     Calcium 9.6 8.6 - 10.2 mg/dL    Total Protein 7.3 6.4 - 8.3 g/dL    Alb 3.8 3.5 - 5.2 g/dL    Total Bilirubin 0.4 0.0 - 1.2 mg/dL    Alkaline Phosphatase 120 (H) 35 - 104 U/L    ALT 21 0 - 32 U/L    AST 27 0 - 31 U/L   Troponin   Result Value Ref Range    Troponin <0.01 0.00 - 0.03 ng/mL   Brain Natriuretic Peptide   Result Value Ref Range    Pro- 0 - 450 pg/mL   Protime-INR   Result Value Ref Range    Protime 11.2 9.3 - 12.4 sec    INR 1.0    APTT   Result Value Ref Range    aPTT 23.1 (L) 24.5 - 35.1 sec   Lactic Acid, Plasma   Result Value Ref Range    Lactic Acid 1.9 0.5 - 2.2 mmol/L   Urinalysis, reflex to microscopic   Result Value Ref Range    Color, UA Yellow Straw/Yellow    Clarity, UA SL CLOUDY Clear    Glucose, Ur Negative Negative mg/dL    Bilirubin Urine Negative Negative    Ketones, Urine Negative Negative mg/dL    Specific Gravity, UA 1.020 1.005 - 1.030    Blood, Urine SMALL (A) Negative    pH, UA 6.0 5.0 - 9.0    Protein, UA TRACE Negative mg/dL    Urobilinogen, Urine 0.2 <2.0 E.U./dL    Nitrite, Urine Negative Negative    Leukocyte Esterase, Urine MODERATE (A) Negative   Microscopic Urinalysis   Result Value Ref Range    WBC, UA >20 0 - 5 /HPF    RBC, UA NONE 0 - 2 /HPF    Bacteria, UA RARE (A) /HPF   POCT Venous   Result Value Ref Range    POC Sodium 133 132 - 146 mmol/L    POC Potassium 3.8 3.5 - 5.0 mmol/L    POC Chloride 102 100 - 108 mmol/L    POC Glucose 181 (H) 74 - 99 mg/dl    POC Creatinine 1.4 (H) 0.5 - 1.0 mg/dL    GFR Non-African tenderness. Skin: warm and dry without rash  Neurologic: GCS 15,  Psych: Normal Affect      ------------------------------ ED COURSE/MEDICAL DECISION MAKING----------------------  Medications   0.9 % sodium chloride IV bolus 1,000 mL (0 mLs Intravenous Stopped 1/25/20 1930)   iopamidol (ISOVUE-370) 76 % injection 60 mL (60 mLs Intravenous Given 1/25/20 1806)   sodium chloride flush 0.9 % injection 10 mL (10 mLs Intravenous Given 1/25/20 1806)   ondansetron (ZOFRAN) injection 4 mg (4 mg Intravenous Given 1/25/20 1857)         ED COURSE:  ED Course as of Jan 25 1942   Sat Jan 25, 2020 1918 Patient's work-up is reassuring. There is no evidence of PE on CTA chest.  She does have leukocytosis but she does have a urinary tract infection as well. Upon review of her urine culture from a few days ago at [facility], patient is growing enterococcus which is not susceptible to [address] It is sensitive to ampicillin and patient will be switched to amoxicillin. Patient's heart rate has improved down to 83 bpm and her blood pressure remained stable. Patient will be ambulated on room air to assess for any evidence of desaturation.    [BM]   1941 Patient ambulated without difficulty. She feels no shortness of breath at this time and she had no desaturation. She states that she wants to go home. She has a reassuring cardiac work up here and her [address] will be switched to amoxicillin due to Enterococcus urine culture from a few days ago which is sensitive to ampicillin. She is instructed to follow up with her PCP and return to the nearest ED for any new or worsening complaints. [BM]      ED Course User Index  [BM] Ana Luisa Gu DO       Medical Decision Making:    Presents with about a week's worth of shortness of breath after getting over which she states was a cold. She had a little bit of coughing with runny nose and congestion but those symptoms seem to be getting.   She does complain of some chest heaviness as well as some lightheadedness and there is concern for PE as the patient is found to be slightly tachycardic. She is saturating 95% on room air and appears comfortable with unlabored breathing. She also complains of UTI symptoms with chronic UTIs and has no CVA tenderness or fever to suggest pyelonephritis. Patient will have baseline blood work drawn including cardiac and infectious evaluation as well as a CTA chest to rule out pulmonary embolism and a urinalysis and urine culture. Counseling: The emergency provider has spoken with the patient and discussed todays results, in addition to providing specific details for the plan of care and counseling regarding the diagnosis and prognosis. Questions are answered at this time and they are agreeable with the plan.      --------------------------------- IMPRESSION AND DISPOSITION ---------------------------------    IMPRESSION  1. Shortness of breath    2.  Urinary tract infection with hematuria, site unspecified        DISPOSITION  Disposition: Discharge to home  Patient condition is stable       Sincere Luna DO  01/25/20 1943

## 2020-01-26 LAB
EKG ATRIAL RATE: 99 BPM
EKG P AXIS: 64 DEGREES
EKG P-R INTERVAL: 168 MS
EKG Q-T INTERVAL: 312 MS
EKG QRS DURATION: 44 MS
EKG QTC CALCULATION (BAZETT): 400 MS
EKG R AXIS: 14 DEGREES
EKG T AXIS: 42 DEGREES
EKG VENTRICULAR RATE: 99 BPM

## 2020-01-26 PROCEDURE — 93010 ELECTROCARDIOGRAM REPORT: CPT | Performed by: INTERNAL MEDICINE

## 2020-01-26 NOTE — ED NOTES
Pt alert oriented skin warm dry resp easy voices no complaints discharge instructions given to pt verbalized understanding discharged via wheelchair     Minda Winters RN  01/25/20 2031

## 2020-01-26 NOTE — ED NOTES
Pt ambulated without complications O2 sat dropped to 90% dr Suárez June aware      Billy Louise, RN  01/25/20 1946

## 2020-01-27 LAB
ORGANISM: ABNORMAL
URINE CULTURE, ROUTINE: ABNORMAL

## 2020-02-06 ENCOUNTER — HOSPITAL ENCOUNTER (INPATIENT)
Age: 85
LOS: 7 days | Discharge: HOME | DRG: 872 | End: 2020-02-13
Attending: EMERGENCY MEDICINE | Admitting: INTERNAL MEDICINE
Payer: MEDICARE

## 2020-02-06 ENCOUNTER — APPOINTMENT (OUTPATIENT)
Dept: CT IMAGING | Age: 85
DRG: 872 | End: 2020-02-06
Payer: MEDICARE

## 2020-02-06 PROBLEM — N30.90 CYSTITIS: Status: ACTIVE | Noted: 2020-02-06

## 2020-02-06 PROBLEM — R06.03 RESPIRATORY DISTRESS: Status: ACTIVE | Noted: 2020-02-06

## 2020-02-06 LAB
APTT: 27.4 SEC (ref 24.5–35.1)
BACTERIA: ABNORMAL /HPF
BASOPHILS ABSOLUTE: 0.06 E9/L (ref 0–0.2)
BASOPHILS RELATIVE PERCENT: 0.3 % (ref 0–2)
BILIRUBIN URINE: NEGATIVE
BLOOD, URINE: ABNORMAL
CLARITY: ABNORMAL
COLOR: YELLOW
EOSINOPHILS ABSOLUTE: 0.05 E9/L (ref 0.05–0.5)
EOSINOPHILS RELATIVE PERCENT: 0.3 % (ref 0–6)
GLUCOSE URINE: 100 MG/DL
HCT VFR BLD CALC: 43 % (ref 34–48)
HEMOGLOBIN: 13.3 G/DL (ref 11.5–15.5)
IMMATURE GRANULOCYTES #: 0.11 E9/L
IMMATURE GRANULOCYTES %: 0.6 % (ref 0–5)
KETONES, URINE: NEGATIVE MG/DL
LACTIC ACID, SEPSIS: 1.5 MMOL/L (ref 0.5–1.9)
LACTIC ACID, SEPSIS: 2 MMOL/L (ref 0.5–1.9)
LEUKOCYTE ESTERASE, URINE: ABNORMAL
LYMPHOCYTES ABSOLUTE: 1.11 E9/L (ref 1.5–4)
LYMPHOCYTES RELATIVE PERCENT: 5.8 % (ref 20–42)
MCH RBC QN AUTO: 25 PG (ref 26–35)
MCHC RBC AUTO-ENTMCNC: 30.9 % (ref 32–34.5)
MCV RBC AUTO: 81 FL (ref 80–99.9)
MONOCYTES ABSOLUTE: 1.23 E9/L (ref 0.1–0.95)
MONOCYTES RELATIVE PERCENT: 6.4 % (ref 2–12)
NEUTROPHILS ABSOLUTE: 16.65 E9/L (ref 1.8–7.3)
NEUTROPHILS RELATIVE PERCENT: 86.6 % (ref 43–80)
NITRITE, URINE: POSITIVE
PDW BLD-RTO: 14.6 FL (ref 11.5–15)
PH UA: 5.5 (ref 5–9)
PLATELET # BLD: 322 E9/L (ref 130–450)
PMV BLD AUTO: 9.7 FL (ref 7–12)
PROTEIN UA: >=300 MG/DL
RBC # BLD: 5.31 E12/L (ref 3.5–5.5)
RBC UA: >20 /HPF (ref 0–2)
SPECIFIC GRAVITY UA: 1.02 (ref 1–1.03)
UROBILINOGEN, URINE: 1 E.U./DL
WBC # BLD: 19.2 E9/L (ref 4.5–11.5)
WBC UA: >20 /HPF (ref 0–5)

## 2020-02-06 PROCEDURE — 6360000002 HC RX W HCPCS: Performed by: INTERNAL MEDICINE

## 2020-02-06 PROCEDURE — 87186 SC STD MICRODIL/AGAR DIL: CPT

## 2020-02-06 PROCEDURE — 2580000003 HC RX 258: Performed by: EMERGENCY MEDICINE

## 2020-02-06 PROCEDURE — 85730 THROMBOPLASTIN TIME PARTIAL: CPT

## 2020-02-06 PROCEDURE — 85025 COMPLETE CBC W/AUTO DIFF WBC: CPT

## 2020-02-06 PROCEDURE — 83605 ASSAY OF LACTIC ACID: CPT

## 2020-02-06 PROCEDURE — 2580000003 HC RX 258: Performed by: INTERNAL MEDICINE

## 2020-02-06 PROCEDURE — 99284 EMERGENCY DEPT VISIT MOD MDM: CPT

## 2020-02-06 PROCEDURE — 1200000000 HC SEMI PRIVATE

## 2020-02-06 PROCEDURE — 87040 BLOOD CULTURE FOR BACTERIA: CPT

## 2020-02-06 PROCEDURE — 87077 CULTURE AEROBIC IDENTIFY: CPT

## 2020-02-06 PROCEDURE — 81001 URINALYSIS AUTO W/SCOPE: CPT

## 2020-02-06 PROCEDURE — 36415 COLL VENOUS BLD VENIPUNCTURE: CPT

## 2020-02-06 PROCEDURE — 87088 URINE BACTERIA CULTURE: CPT

## 2020-02-06 PROCEDURE — 6370000000 HC RX 637 (ALT 250 FOR IP): Performed by: INTERNAL MEDICINE

## 2020-02-06 PROCEDURE — 74176 CT ABD & PELVIS W/O CONTRAST: CPT

## 2020-02-06 PROCEDURE — 6360000002 HC RX W HCPCS: Performed by: EMERGENCY MEDICINE

## 2020-02-06 RX ORDER — CHOLECALCIFEROL (VITAMIN D3) 50 MCG
2000 TABLET ORAL DAILY
Status: DISCONTINUED | OUTPATIENT
Start: 2020-02-06 | End: 2020-02-13 | Stop reason: HOSPADM

## 2020-02-06 RX ORDER — OMEPRAZOLE 20 MG/1
20 CAPSULE, DELAYED RELEASE ORAL DAILY
COMMUNITY
End: 2020-09-22 | Stop reason: ALTCHOICE

## 2020-02-06 RX ORDER — ONDANSETRON 2 MG/ML
4 INJECTION INTRAMUSCULAR; INTRAVENOUS ONCE
Status: COMPLETED | OUTPATIENT
Start: 2020-02-06 | End: 2020-02-06

## 2020-02-06 RX ORDER — FENTANYL CITRATE 50 UG/ML
25 INJECTION, SOLUTION INTRAMUSCULAR; INTRAVENOUS ONCE
Status: COMPLETED | OUTPATIENT
Start: 2020-02-06 | End: 2020-02-06

## 2020-02-06 RX ORDER — OMEPRAZOLE 20 MG/1
20 CAPSULE, DELAYED RELEASE ORAL DAILY
Status: DISCONTINUED | OUTPATIENT
Start: 2020-02-06 | End: 2020-02-13 | Stop reason: HOSPADM

## 2020-02-06 RX ORDER — ASPIRIN 81 MG/1
81 TABLET ORAL 2 TIMES DAILY
Status: DISCONTINUED | OUTPATIENT
Start: 2020-02-06 | End: 2020-02-13 | Stop reason: HOSPADM

## 2020-02-06 RX ORDER — 0.9 % SODIUM CHLORIDE 0.9 %
1000 INTRAVENOUS SOLUTION INTRAVENOUS ONCE
Status: COMPLETED | OUTPATIENT
Start: 2020-02-06 | End: 2020-02-06

## 2020-02-06 RX ORDER — 0.9 % SODIUM CHLORIDE 0.9 %
500 INTRAVENOUS SOLUTION INTRAVENOUS ONCE
Status: COMPLETED | OUTPATIENT
Start: 2020-02-06 | End: 2020-02-06

## 2020-02-06 RX ORDER — AMOXICILLIN 500 MG/1
500 CAPSULE ORAL 2 TIMES DAILY
Status: ON HOLD | COMMUNITY
Start: 2020-01-25 | End: 2020-02-12 | Stop reason: HOSPADM

## 2020-02-06 RX ORDER — SODIUM CHLORIDE 9 MG/ML
INJECTION, SOLUTION INTRAVENOUS CONTINUOUS
Status: DISCONTINUED | OUTPATIENT
Start: 2020-02-06 | End: 2020-02-13 | Stop reason: HOSPADM

## 2020-02-06 RX ORDER — ONDANSETRON 2 MG/ML
4 INJECTION INTRAMUSCULAR; INTRAVENOUS EVERY 6 HOURS PRN
Status: DISCONTINUED | OUTPATIENT
Start: 2020-02-06 | End: 2020-02-13 | Stop reason: HOSPADM

## 2020-02-06 RX ORDER — TRIAMTERENE AND HYDROCHLOROTHIAZIDE 37.5; 25 MG/1; MG/1
1 TABLET ORAL EVERY MORNING
Status: DISCONTINUED | OUTPATIENT
Start: 2020-02-07 | End: 2020-02-13 | Stop reason: HOSPADM

## 2020-02-06 RX ORDER — HYDROCODONE BITARTRATE AND ACETAMINOPHEN 5; 325 MG/1; MG/1
1 TABLET ORAL EVERY 6 HOURS PRN
Status: DISCONTINUED | OUTPATIENT
Start: 2020-02-06 | End: 2020-02-13 | Stop reason: HOSPADM

## 2020-02-06 RX ORDER — IRBESARTAN 150 MG/1
150 TABLET ORAL DAILY
Status: DISCONTINUED | OUTPATIENT
Start: 2020-02-06 | End: 2020-02-06 | Stop reason: SDUPTHER

## 2020-02-06 RX ORDER — POLYVINYL ALCOHOL 14 MG/ML
1 SOLUTION/ DROPS OPHTHALMIC 2 TIMES DAILY
Status: DISCONTINUED | OUTPATIENT
Start: 2020-02-06 | End: 2020-02-13 | Stop reason: HOSPADM

## 2020-02-06 RX ORDER — TETRAHYDROZOLINE HCL 0.05 %
1 DROPS OPHTHALMIC (EYE) DAILY PRN
COMMUNITY

## 2020-02-06 RX ORDER — ACETAMINOPHEN 500 MG
250 TABLET ORAL EVERY 4 HOURS PRN
Status: DISCONTINUED | OUTPATIENT
Start: 2020-02-06 | End: 2020-02-13 | Stop reason: HOSPADM

## 2020-02-06 RX ORDER — LOSARTAN POTASSIUM 50 MG/1
50 TABLET ORAL DAILY
Status: DISCONTINUED | OUTPATIENT
Start: 2020-02-07 | End: 2020-02-13 | Stop reason: HOSPADM

## 2020-02-06 RX ADMIN — Medication 2000 UNITS: at 18:00

## 2020-02-06 RX ADMIN — ENOXAPARIN SODIUM 30 MG: 30 INJECTION SUBCUTANEOUS at 17:59

## 2020-02-06 RX ADMIN — CEFTRIAXONE SODIUM 1 G: 1 INJECTION, POWDER, FOR SOLUTION INTRAMUSCULAR; INTRAVENOUS at 21:26

## 2020-02-06 RX ADMIN — ASPIRIN 81 MG: 81 TABLET, COATED ORAL at 20:51

## 2020-02-06 RX ADMIN — SODIUM CHLORIDE 1000 ML: 9 INJECTION, SOLUTION INTRAVENOUS at 10:42

## 2020-02-06 RX ADMIN — FENTANYL CITRATE 25 MCG: 50 INJECTION, SOLUTION INTRAMUSCULAR; INTRAVENOUS at 08:41

## 2020-02-06 RX ADMIN — ONDANSETRON 4 MG: 2 INJECTION INTRAMUSCULAR; INTRAVENOUS at 08:41

## 2020-02-06 RX ADMIN — ONDANSETRON 4 MG: 2 INJECTION INTRAMUSCULAR; INTRAVENOUS at 20:26

## 2020-02-06 RX ADMIN — HYDROCODONE BITARTRATE AND ACETAMINOPHEN 1 TABLET: 5; 325 TABLET ORAL at 20:25

## 2020-02-06 RX ADMIN — FENTANYL CITRATE 25 MCG: 50 INJECTION, SOLUTION INTRAMUSCULAR; INTRAVENOUS at 11:29

## 2020-02-06 RX ADMIN — OMEPRAZOLE 20 MG: 20 CAPSULE, DELAYED RELEASE ORAL at 18:00

## 2020-02-06 RX ADMIN — SODIUM CHLORIDE 500 ML: 9 INJECTION, SOLUTION INTRAVENOUS at 07:25

## 2020-02-06 RX ADMIN — SODIUM CHLORIDE: 9 INJECTION, SOLUTION INTRAVENOUS at 17:59

## 2020-02-06 ASSESSMENT — PAIN SCALES - GENERAL
PAINLEVEL_OUTOF10: 3
PAINLEVEL_OUTOF10: 7
PAINLEVEL_OUTOF10: 8

## 2020-02-06 ASSESSMENT — PAIN DESCRIPTION - ORIENTATION: ORIENTATION: LEFT;RIGHT;LOWER

## 2020-02-06 ASSESSMENT — PAIN DESCRIPTION - LOCATION: LOCATION: BACK;ABDOMEN

## 2020-02-06 ASSESSMENT — PAIN DESCRIPTION - FREQUENCY: FREQUENCY: CONTINUOUS

## 2020-02-06 ASSESSMENT — PAIN DESCRIPTION - DESCRIPTORS: DESCRIPTORS: BURNING

## 2020-02-06 ASSESSMENT — PAIN DESCRIPTION - PAIN TYPE: TYPE: ACUTE PAIN

## 2020-02-06 NOTE — PROGRESS NOTES
Patient seen and examined in the ED  Has had 3 ED visits past several weeks  Recurrent complicated UTI with suprapubic pain  RRR  Lungs clear  Abdomen soft with suprapubic tenderness  Continue parenteral fluids   ID to see

## 2020-02-06 NOTE — ED PROVIDER NOTES
mg/dL    Bilirubin Urine Negative Negative    Ketones, Urine Negative Negative mg/dL    Specific Gravity, UA 1.025 1.005 - 1.030    Blood, Urine LARGE (A) Negative    pH, UA 5.5 5.0 - 9.0    Protein, UA >=300 (A) Negative mg/dL    Urobilinogen, Urine 1.0 <2.0 E.U./dL    Nitrite, Urine POSITIVE (A) Negative    Leukocyte Esterase, Urine MODERATE (A) Negative   CBC auto differential   Result Value Ref Range    WBC 19.2 (H) 4.5 - 11.5 E9/L    RBC 5.31 3.50 - 5.50 E12/L    Hemoglobin 13.3 11.5 - 15.5 g/dL    Hematocrit 43.0 34.0 - 48.0 %    MCV 81.0 80.0 - 99.9 fL    MCH 25.0 (L) 26.0 - 35.0 pg    MCHC 30.9 (L) 32.0 - 34.5 %    RDW 14.6 11.5 - 15.0 fL    Platelets 902 671 - 784 E9/L    MPV 9.7 7.0 - 12.0 fL    Neutrophils % 86.6 (H) 43.0 - 80.0 %    Immature Granulocytes % 0.6 0.0 - 5.0 %    Lymphocytes % 5.8 (L) 20.0 - 42.0 %    Monocytes % 6.4 2.0 - 12.0 %    Eosinophils % 0.3 0.0 - 6.0 %    Basophils % 0.3 0.0 - 2.0 %    Neutrophils Absolute 16.65 (H) 1.80 - 7.30 E9/L    Immature Granulocytes # 0.11 E9/L    Lymphocytes Absolute 1.11 (L) 1.50 - 4.00 E9/L    Monocytes Absolute 1.23 (H) 0.10 - 0.95 E9/L    Eosinophils Absolute 0.05 0.05 - 0.50 E9/L    Basophils Absolute 0.06 0.00 - 0.20 E9/L   APTT   Result Value Ref Range    aPTT 27.4 24.5 - 35.1 sec   Lactate, Sepsis   Result Value Ref Range    Lactic Acid, Sepsis 2.0 (H) 0.5 - 1.9 mmol/L   Microscopic Urinalysis   Result Value Ref Range    WBC, UA >20 0 - 5 /HPF    RBC, UA >20 0 - 2 /HPF    Bacteria, UA MODERATE (A) /HPF       RADIOLOGY:  Interpreted by Radiologist.  CT ABDOMEN PELVIS WO CONTRAST Additional Contrast? None   Final Result   Severe cystitis is  significantly thickened inflamed urinary bladder. There is no obstructing uropathy. 2 mm nonobstructing left kidney   stone is present. EKG:  This EKG is signed and interpreted by the EP.     Time:   Rate:   Rhythm:   Interpretation:   Comparison:       ------------------------- NURSING NOTES AND VITALS REVIEWED ---------------------------   The nursing notes within the ED encounter and vital signs as below have been reviewed by myself. BP (!) 132/91   Pulse 105   Temp 98.3 °F (36.8 °C) (Temporal)   Resp 16   Ht 5' 4\" (1.626 m)   Wt 160 lb (72.6 kg)   SpO2 96%   BMI 27.46 kg/m²   Oxygen Saturation Interpretation: Normal    The patients available past medical records and past encounters were reviewed. ------------------------------ ED COURSE/MEDICAL DECISION MAKING----------------------  Medications   0.9 % sodium chloride bolus (has no administration in time range)   0.9 % sodium chloride bolus (500 mLs Intravenous New Bag 2/6/20 0725)         ED COURSE:       Medical Decision Making:    Recurrent uti despite 2 rounds of abx, wbc elevated, will admit for iv abx, consulted pcp, pt well known, requested no abx to be given until I/d seen, will admit      This patient's ED course included: a personal history and physicial examination    This patient has remained hemodynamically stable during their ED course. Re-Evaluations:             Re-evaluation. Patients symptoms are improving    Re-examination  2/6/20   6:11 AM          Vital Signs:   Vitals:    02/06/20 0504 02/06/20 0519   BP:  (!) 132/91   Pulse: 105 105   Resp:  16   Temp:  98.3 °F (36.8 °C)   TempSrc:  Temporal   SpO2: 96% 96%   Weight:  160 lb (72.6 kg)   Height:  5' 4\" (1.626 m)     Card/Pulm:  Rhythm: normal rate. Heart Sounds: no murmurs, gallops, or rubs. clear to auscultation, no wheezes or rales and unlabored breathing. Capillary Refill: normal.  Radial Pulse:  equal.  Skin:  Warm. Consultations:             Dr Quinn Mandujano:         Counseling: The emergency provider has spoken with the patient and discussed todays results, in addition to providing specific details for the plan of care and counseling regarding the diagnosis and prognosis.   Questions are answered at this time and they are agreeable with the plan.       --------------------------------- IMPRESSION AND DISPOSITION ---------------------------------    IMPRESSION  1. Acute cystitis with hematuria        DISPOSITION  Disposition: Admit to med/surg floor  Patient condition is fair    NOTE: This report was transcribed using voice recognition software.  Every effort was made to ensure accuracy; however, inadvertent computerized transcription errors may be present        Mayda Rivera MD  02/06/20 8628

## 2020-02-07 LAB
ALBUMIN SERPL-MCNC: 3.1 G/DL (ref 3.5–5.2)
ALP BLD-CCNC: 95 U/L (ref 35–104)
ALT SERPL-CCNC: 23 U/L (ref 0–32)
ANION GAP SERPL CALCULATED.3IONS-SCNC: 12 MMOL/L (ref 7–16)
AST SERPL-CCNC: 25 U/L (ref 0–31)
BILIRUB SERPL-MCNC: 0.5 MG/DL (ref 0–1.2)
BUN BLDV-MCNC: 18 MG/DL (ref 8–23)
CALCIUM SERPL-MCNC: 8.4 MG/DL (ref 8.6–10.2)
CHLORIDE BLD-SCNC: 103 MMOL/L (ref 98–107)
CO2: 19 MMOL/L (ref 22–29)
CREAT SERPL-MCNC: 1 MG/DL (ref 0.5–1)
GFR AFRICAN AMERICAN: >60
GFR NON-AFRICAN AMERICAN: 53 ML/MIN/1.73
GLUCOSE BLD-MCNC: 114 MG/DL (ref 74–99)
HCT VFR BLD CALC: 36.4 % (ref 34–48)
HEMOGLOBIN: 11 G/DL (ref 11.5–15.5)
MCH RBC QN AUTO: 24.8 PG (ref 26–35)
MCHC RBC AUTO-ENTMCNC: 30.2 % (ref 32–34.5)
MCV RBC AUTO: 82 FL (ref 80–99.9)
PDW BLD-RTO: 14.8 FL (ref 11.5–15)
PLATELET # BLD: 259 E9/L (ref 130–450)
PMV BLD AUTO: 9.8 FL (ref 7–12)
POTASSIUM SERPL-SCNC: 3.6 MMOL/L (ref 3.5–5)
RBC # BLD: 4.44 E12/L (ref 3.5–5.5)
SODIUM BLD-SCNC: 134 MMOL/L (ref 132–146)
TOTAL PROTEIN: 6.1 G/DL (ref 6.4–8.3)
WBC # BLD: 15.5 E9/L (ref 4.5–11.5)

## 2020-02-07 PROCEDURE — 2580000003 HC RX 258: Performed by: SPECIALIST

## 2020-02-07 PROCEDURE — 6370000000 HC RX 637 (ALT 250 FOR IP): Performed by: INTERNAL MEDICINE

## 2020-02-07 PROCEDURE — 36415 COLL VENOUS BLD VENIPUNCTURE: CPT

## 2020-02-07 PROCEDURE — 6360000002 HC RX W HCPCS: Performed by: INTERNAL MEDICINE

## 2020-02-07 PROCEDURE — 85027 COMPLETE CBC AUTOMATED: CPT

## 2020-02-07 PROCEDURE — 80053 COMPREHEN METABOLIC PANEL: CPT

## 2020-02-07 PROCEDURE — 6360000002 HC RX W HCPCS: Performed by: SPECIALIST

## 2020-02-07 PROCEDURE — 6370000000 HC RX 637 (ALT 250 FOR IP): Performed by: SPECIALIST

## 2020-02-07 PROCEDURE — 1200000000 HC SEMI PRIVATE

## 2020-02-07 RX ORDER — LINEZOLID 600 MG/1
600 TABLET, FILM COATED ORAL EVERY 12 HOURS SCHEDULED
Status: DISCONTINUED | OUTPATIENT
Start: 2020-02-07 | End: 2020-02-08

## 2020-02-07 RX ADMIN — ONDANSETRON 4 MG: 2 INJECTION INTRAMUSCULAR; INTRAVENOUS at 13:46

## 2020-02-07 RX ADMIN — ENOXAPARIN SODIUM 30 MG: 30 INJECTION SUBCUTANEOUS at 09:19

## 2020-02-07 RX ADMIN — OMEPRAZOLE 20 MG: 20 CAPSULE, DELAYED RELEASE ORAL at 09:19

## 2020-02-07 RX ADMIN — Medication 2000 UNITS: at 09:19

## 2020-02-07 RX ADMIN — ONDANSETRON 4 MG: 2 INJECTION INTRAMUSCULAR; INTRAVENOUS at 03:33

## 2020-02-07 RX ADMIN — ASPIRIN 81 MG: 81 TABLET, COATED ORAL at 20:30

## 2020-02-07 RX ADMIN — ACETAMINOPHEN 250 MG: 500 TABLET ORAL at 13:46

## 2020-02-07 RX ADMIN — ERTAPENEM SODIUM 1000 MG: 1 INJECTION, POWDER, LYOPHILIZED, FOR SOLUTION INTRAMUSCULAR; INTRAVENOUS at 16:44

## 2020-02-07 RX ADMIN — LINEZOLID 600 MG: 600 TABLET, FILM COATED ORAL at 20:30

## 2020-02-07 RX ADMIN — ONDANSETRON 4 MG: 2 INJECTION INTRAMUSCULAR; INTRAVENOUS at 20:30

## 2020-02-07 RX ADMIN — POLYVINYL ALCOHOL 1 DROP: 14 SOLUTION/ DROPS OPHTHALMIC at 20:30

## 2020-02-07 RX ADMIN — ASPIRIN 81 MG: 81 TABLET, COATED ORAL at 09:19

## 2020-02-07 RX ADMIN — HYDROCODONE BITARTRATE AND ACETAMINOPHEN 1 TABLET: 5; 325 TABLET ORAL at 03:33

## 2020-02-07 ASSESSMENT — PAIN DESCRIPTION - DESCRIPTORS
DESCRIPTORS: ACHING;DISCOMFORT;SORE
DESCRIPTORS: ACHING
DESCRIPTORS: DISCOMFORT

## 2020-02-07 ASSESSMENT — PAIN DESCRIPTION - LOCATION
LOCATION: ABDOMEN;BACK

## 2020-02-07 ASSESSMENT — PAIN DESCRIPTION - ONSET
ONSET: ON-GOING

## 2020-02-07 ASSESSMENT — PAIN SCALES - GENERAL
PAINLEVEL_OUTOF10: 7
PAINLEVEL_OUTOF10: 5
PAINLEVEL_OUTOF10: 2
PAINLEVEL_OUTOF10: 0
PAINLEVEL_OUTOF10: 3

## 2020-02-07 ASSESSMENT — PAIN DESCRIPTION - FREQUENCY
FREQUENCY: CONTINUOUS

## 2020-02-07 ASSESSMENT — PAIN DESCRIPTION - PAIN TYPE
TYPE: ACUTE PAIN

## 2020-02-07 ASSESSMENT — PAIN SCALES - WONG BAKER: WONGBAKER_NUMERICALRESPONSE: 0

## 2020-02-07 NOTE — CARE COORDINATION
Met with the pt to discuss transition of care. She lives alone on the first floor. She has a walker, cane, and commode riser. She alternates with the walker and cane. She will return home when medically stable. She will need transportation home. The last admission she used [employer].

## 2020-02-07 NOTE — CONSULTS
currently there is a urine culture that is got greater than 100,000 of gram-positive cocci and less than 10,000 of gram-negative rods            5/11/2019 seen by Dr. Devin Hernandez recurrent UTI  11/24/2018 seen by Lexie-outpatient treatment failed with Omnicef  7/24/2018: Seen by Dr. Ryan Gooden  3/2018 and 6/2018 repeated referrals for cystitis treated with cefdinir and Cipro respectively  10/7/2017: Seen by Dr. Devin Hernandez for complicated UTI with multidrug-resistant E. coli        Microbiology:  2/6/2020 greater than 100,000 gram-positive cocci  1/25/2028 Enterococcus faecalis sensitive to penicillin and vancomycin  10/11/2019 nasal swab positive for MRSA  10/2/2019 Klebsiella oxytoca resistant only to ampicillin  5/10/2019 E. coli resistant to trimethoprim and ampicillin  11/24/2018 E. coli ESBL  7/27/2018 ESBL E. coli  10/6/2017 E. coli ESBL  12/7/2015 E. coli only resistant to ampicillin and trimethoprim sulfa  Past Medical History:        Diagnosis Date    Acute gastroenteritis 11/27/2018    Arthritis     AVB (atrioventricular block) 6/20/2012    CAD (coronary artery disease)     Cancer ([facility] [address]     colon treated with surgery 1990's    Chronic kidney disease, stage 3 ([address]     begining of stage 3    Complicated UTI (urinary tract infection) 10/8/2017    GERD (gastroesophageal reflux disease)     [place] (hard of hearing)     Hypertension     uncontrolled BP    Hypokalemia 11/27/2018    Psoriasis     Symptomatic bradycardia      Past Surgical History:        Procedure Laterality Date    APPENDECTOMY      BACK SURGERY      CARDIAC CATHETERIZATION  02/10/2016    Dr. Nomi Bourne single vessel disease, no stents placed     CHOLECYSTECTOMY      COLONOSCOPY      ECHO COMPLETE  6/20/2012         ENDOSCOPY, COLON, DIAGNOSTIC      HYSTERECTOMY      JOINT REPLACEMENT      left hip 2012    KYPHOSIS SURGERY      PACEMAKER PLACEMENT  6-    [facility] dual chamber - Dr. Loraine Mera ARTHROPLASTY Right 10/15/2019    RIGHT HIP TOTAL ARTHROPLASTY performed by Edu Escalante MD at [facility] OR     Current Medications:   Scheduled Meds:   aspirin  81 mg Oral BID    polyvinyl alcohol  1 drop Both Eyes BID    vitamin D  2,000 Units Oral Daily    omeprazole  20 mg Oral Daily    triamterene-hydrochlorothiazide  1 tablet Oral QAM    enoxaparin  30 mg Subcutaneous Daily    losartan  50 mg Oral Daily    cefTRIAXone (ROCEPHIN) IV  1 g Intravenous Q24H     Continuous Infusions:   sodium chloride 100 mL/hr at 20 1759     PRN Meds:acetaminophen, sodium chloride, HYDROcodone 5 mg - acetaminophen, ondansetron    Allergies:  Codeine; Amlodipine; Augmentin [amoxicillin-pot clavulanate]; Bactrim; Benadryl [diphenhydramine]; Brovana [arformoterol]; Cardizem [diltiazem hcl]; Doxazosin; Ipratropium; Macrodantin [nitrofurantoin macrocrystal]; and Verapamil    Social History:   Social History     Socioeconomic History    Marital status:       Spouse name: None    Number of children: 2    Years of education: None    Highest education level: None   Occupational History    None   Social Needs    Financial resource strain: None    Food insecurity:     Worry: None     Inability: None    Transportation needs:     Medical: None     Non-medical: None   Tobacco Use    Smoking status: Former Smoker     Packs/day: 1.00     Years: 17.00     Pack years: 17.00     Types: Cigarettes     Start date: 1965     Last attempt to quit: 1982     Years since quittin.6    Smokeless tobacco: Never Used   Substance and Sexual Activity    Alcohol use: No    Drug use: No    Sexual activity: Never   Lifestyle    Physical activity:     Days per week: None     Minutes per session: None    Stress: None   Relationships    Social connections:     Talks on phone: None     Gets together: None     Attends Mandaeism service: None     Active member of club or organization: None     Attends meetings of clubs or organizations: None     Relationship status: None    Intimate partner violence:     Fear of current or ex partner: None     Emotionally abused: None     Physically abused: None     Forced sexual activity: None   Other Topics Concern    None   Social History Narrative    None     Tobacco: No  Alcohol: No  Pets: No  Travel: No    Family History:       Problem Relation Age of Onset    Heart Disease Mother     Heart Attack Mother     Other Father     Asthma Father    . Otherwise non-pertinent to the chief complaint. REVIEW OF SYSTEMS:    CONSTITUTIONAL:  No chills, fevers or night sweats. No loss of weight. EYES:  No double vision or drainage from eyes, ears or throat. HEENT:  No neck stiffness. No dysphagia. No drainage from eyes, ears or throat  RESPIRATORY:  No cough, productive sputum or hemoptysis. CARDIOVASCULAR:  No chest pain, palpitations, orthopnea or dyspnea on exertion. GASTROINTESTINAL:  No nausea, vomiting, diarrhea or constipation or hematochezia   GENITOURINARY: See history of present illness  INTEGUMENT/BREAST:  No rash or breast masses. HEMATOLOGIC/LYMPHATIC:  No lymphadenopathy or blood dyscrasics. ALLERGIC/IMMUNOLOGIC:  No anaphylaxis. ENDOCRINE:  No polyuria or polydipsia or temperature intolerance. MUSCULOSKELETAL:  No myalgia or arthralgia. Full ROM. NEUROLOGICAL:  No focal motor sensory deficit. BEHAVIOR/PSYCH:  No psychosis. PHYSICAL EXAM:    Vitals:    BP (!) 121/58   Pulse 86   Temp 98.9 °F (37.2 °C) (Temporal)   Resp 16   Ht 5' 4\" (1.626 m)   Wt 160 lb (72.6 kg)   SpO2 96%   BMI 27.46 kg/m²   Constitutional: The patient is awake, alert, and oriented. Skin: Warm and dry. No rashes were noted. No jaundice. HEENT: Eyes show round, and reactive pupils. Moist mucous membranes, no ulcerations, no thrush. Neck: Supple to movements. No lymphadenopathy. Chest: No use of accessory muscles to breathe. Symmetrical expansion.  Auscultation reveals no wheezing, crackles, or rhonchi. Cardiovascular: S1 and S2 are rhythmic and regular. No murmurs appreciated. Abdomen: Positive bowel sounds to auscultation. Benign to palpation. No masses felt. No hepatosplenomegaly. Genitourinary: VAC Ng in place  Extremities: No clubbing, no cyanosis, no edema.   Musculoskeletal: Equal and symmetrical  Neurological: No focal  Lines: peripheral      CBC+dif:  Recent Labs     02/06/20  0658 02/07/20  0435   WBC 19.2* 15.5*   HGB 13.3 11.0*   HCT 43.0 36.4   MCV 81.0 82.0    259   NEUTROABS 16.65*  --      Lab Results   Component Value Date    CRP 2.0 (H) 07/25/2018     No results found for: CRP  Lab Results   Component Value Date    SEDRATE 21 (H) 07/25/2018     Lab Results   Component Value Date    ALT 23 02/07/2020    AST 25 02/07/2020    ALKPHOS 95 02/07/2020    BILITOT 0.5 02/07/2020     Lab Results   Component Value Date     02/07/2020    K 3.6 02/07/2020    K 3.9 01/25/2020     02/07/2020    CO2 19 02/07/2020    BUN 18 02/07/2020    CREATININE 1.0 02/07/2020    GFRAA >60 02/07/2020    LABGLOM 53 02/07/2020    GLUCOSE 114 02/07/2020    PROT 6.1 02/07/2020    LABALBU 3.1 02/07/2020    CALCIUM 8.4 02/07/2020    BILITOT 0.5 02/07/2020    ALKPHOS 95 02/07/2020    AST 25 02/07/2020    ALT 23 02/07/2020       Lab Results   Component Value Date    PROTIME 11.2 01/25/2020    PROTIME 17.3 05/13/2012    INR 1.0 01/25/2020       Lab Results   Component Value Date    TSH 2.680 01/28/2019       Lab Results   Component Value Date    COLORU Yellow 02/06/2020    PHUR 5.5 02/06/2020    WBCUA >20 02/06/2020    RBCUA >20 02/06/2020    RBCUA 0-1 07/10/2013    YEAST FEW 10/02/2019    BACTERIA MODERATE 02/06/2020    CLARITYU TURBID 02/06/2020    SPECGRAV 1.025 02/06/2020    LEUKOCYTESUR MODERATE 02/06/2020    UROBILINOGEN 1.0 02/06/2020    BILIRUBINUR Negative 02/06/2020    BLOODU LARGE 02/06/2020    GLUCOSEU 100 02/06/2020       No results found for: ARL3QCU, BEART, M4JHEPWC, PHART, THGBART, XLD3RHC, PO2ART, TZI4LEG  Radiology:  CT ABDOMEN PELVIS WO CONTRAST Additional Contrast? None   Final Result   Severe cystitis is  significantly thickened inflamed urinary bladder. There is no obstructing uropathy. 2 mm nonobstructing left kidney   stone is present. Microbiology:  Pending  Recent Labs     02/06/20  0658   BC 24 Hours- no growth     Recent Labs     02/06/20  0625   ORG Gram positive cocci*     Recent Labs     02/06/20  0700   BLOODCULT2 24 Hours- no growth     No results for input(s): STREPNEUMAGU in the last 72 hours. No results for input(s): LP1UAG in the last 72 hours. No results for input(s): ASO in the last 72 hours. No results for input(s): CULTRESP in the last 72 hours. Assessment:  · Recurrent cystitis going back 5 years from E. coli that is very sensitive to ESBL E. coli then to Klebsiella to enterococcus the last few cultures  · Spastic bladder with overflow incontinence  · Some hygiene concerns superimposed    Plan:    · Cont Invanz and Zyvox pending the final culture of the urine  · Urology reevaluation. She has seen Dr. Ida Garay in the past-patient may need a suprapubic catheter  · VAC Ng placed and now unable to check a bladder scan properly  · Check cultures  · Baseline ESR, CRP  · Monitor labs  · Will follow with you    Thank you for having us see this patient in consultation. I will be discussing this case with the treating physicians.       Electronically signed by Charlett Boxer, MD on 2/7/2020 at 1:47 PM

## 2020-02-07 NOTE — FLOWSHEET NOTE
Patient stated that she did not have advance directives for health care.  provided information and documents. Patient stated she will discuss her health care wishes with family. 02/07/20 1230   Encounter Summary   Services provided to: Patient   Referral/Consult From: [address] [facility];Family members   Continue Visiting   ([phone])   Complexity of Encounter Moderate   Spiritual Assessment Completed Yes   Advance Care Planning Yes   Routine   Type Initial   Spiritual/Mandaeism   Type Spiritual support   Assessment Calm; Approachable   Intervention Active listening;Explored feelings, thoughts, concerns;Nurtured hope;Prayer;Provided reading materials/devotional materials; Discussed death;Discussed relationship with God;Discussed belief system/Sikhism practices/bj;Discussed illness/injury and it's impact   Outcome Comfort;Expressed gratitude;Engaged in conversation;Expressed feelings/needs/concerns;Encouraged;Receptive   Advance Directives (For Healthcare)   Healthcare Directive No, patient does not have an advance directive for healthcare treatment   Information on Healthcare Directives Requested Other (Comment)  (Provided)   Advance Directives Documents given

## 2020-02-08 LAB
ALBUMIN SERPL-MCNC: 3 G/DL (ref 3.5–5.2)
ALP BLD-CCNC: 86 U/L (ref 35–104)
ALT SERPL-CCNC: 15 U/L (ref 0–32)
ANION GAP SERPL CALCULATED.3IONS-SCNC: 12 MMOL/L (ref 7–16)
AST SERPL-CCNC: 18 U/L (ref 0–31)
BILIRUB SERPL-MCNC: 0.4 MG/DL (ref 0–1.2)
BUN BLDV-MCNC: 14 MG/DL (ref 8–23)
CALCIUM SERPL-MCNC: 7.9 MG/DL (ref 8.6–10.2)
CHLORIDE BLD-SCNC: 108 MMOL/L (ref 98–107)
CO2: 19 MMOL/L (ref 22–29)
CREAT SERPL-MCNC: 1 MG/DL (ref 0.5–1)
GFR AFRICAN AMERICAN: >60
GFR NON-AFRICAN AMERICAN: 53 ML/MIN/1.73
GLUCOSE BLD-MCNC: 95 MG/DL (ref 74–99)
HCT VFR BLD CALC: 33 % (ref 34–48)
HEMOGLOBIN: 9.8 G/DL (ref 11.5–15.5)
MCH RBC QN AUTO: 24.8 PG (ref 26–35)
MCHC RBC AUTO-ENTMCNC: 29.7 % (ref 32–34.5)
MCV RBC AUTO: 83.5 FL (ref 80–99.9)
PDW BLD-RTO: 14.8 FL (ref 11.5–15)
PLATELET # BLD: 226 E9/L (ref 130–450)
PMV BLD AUTO: 10 FL (ref 7–12)
POTASSIUM SERPL-SCNC: 3.6 MMOL/L (ref 3.5–5)
RBC # BLD: 3.95 E12/L (ref 3.5–5.5)
SODIUM BLD-SCNC: 139 MMOL/L (ref 132–146)
TOTAL PROTEIN: 5.7 G/DL (ref 6.4–8.3)
WBC # BLD: 10.3 E9/L (ref 4.5–11.5)

## 2020-02-08 PROCEDURE — 6360000002 HC RX W HCPCS: Performed by: INTERNAL MEDICINE

## 2020-02-08 PROCEDURE — 1200000000 HC SEMI PRIVATE

## 2020-02-08 PROCEDURE — 6360000002 HC RX W HCPCS: Performed by: SPECIALIST

## 2020-02-08 PROCEDURE — 80053 COMPREHEN METABOLIC PANEL: CPT

## 2020-02-08 PROCEDURE — 6370000000 HC RX 637 (ALT 250 FOR IP): Performed by: INTERNAL MEDICINE

## 2020-02-08 PROCEDURE — 36415 COLL VENOUS BLD VENIPUNCTURE: CPT

## 2020-02-08 PROCEDURE — 85027 COMPLETE CBC AUTOMATED: CPT

## 2020-02-08 PROCEDURE — 6370000000 HC RX 637 (ALT 250 FOR IP): Performed by: SPECIALIST

## 2020-02-08 PROCEDURE — 2580000003 HC RX 258: Performed by: SPECIALIST

## 2020-02-08 RX ADMIN — OMEPRAZOLE 20 MG: 20 CAPSULE, DELAYED RELEASE ORAL at 08:30

## 2020-02-08 RX ADMIN — ERTAPENEM SODIUM 1000 MG: 1 INJECTION, POWDER, LYOPHILIZED, FOR SOLUTION INTRAMUSCULAR; INTRAVENOUS at 15:38

## 2020-02-08 RX ADMIN — HYDROCODONE BITARTRATE AND ACETAMINOPHEN 1 TABLET: 5; 325 TABLET ORAL at 21:46

## 2020-02-08 RX ADMIN — Medication 2000 UNITS: at 08:30

## 2020-02-08 RX ADMIN — ASPIRIN 81 MG: 81 TABLET, COATED ORAL at 21:46

## 2020-02-08 RX ADMIN — POLYVINYL ALCOHOL 1 DROP: 14 SOLUTION/ DROPS OPHTHALMIC at 08:30

## 2020-02-08 RX ADMIN — ASPIRIN 81 MG: 81 TABLET, COATED ORAL at 08:30

## 2020-02-08 RX ADMIN — LINEZOLID 600 MG: 600 TABLET, FILM COATED ORAL at 08:30

## 2020-02-08 RX ADMIN — HYDROCODONE BITARTRATE AND ACETAMINOPHEN 1 TABLET: 5; 325 TABLET ORAL at 08:35

## 2020-02-08 RX ADMIN — ENOXAPARIN SODIUM 30 MG: 30 INJECTION SUBCUTANEOUS at 08:30

## 2020-02-08 RX ADMIN — POLYVINYL ALCOHOL 1 DROP: 14 SOLUTION/ DROPS OPHTHALMIC at 21:46

## 2020-02-08 ASSESSMENT — PAIN DESCRIPTION - FREQUENCY
FREQUENCY: CONTINUOUS

## 2020-02-08 ASSESSMENT — PAIN SCALES - GENERAL
PAINLEVEL_OUTOF10: 2
PAINLEVEL_OUTOF10: 6
PAINLEVEL_OUTOF10: 0
PAINLEVEL_OUTOF10: 6

## 2020-02-08 ASSESSMENT — PAIN DESCRIPTION - ONSET
ONSET: ON-GOING

## 2020-02-08 ASSESSMENT — PAIN SCALES - WONG BAKER: WONGBAKER_NUMERICALRESPONSE: 0

## 2020-02-08 ASSESSMENT — PAIN DESCRIPTION - LOCATION
LOCATION: ABDOMEN;BACK
LOCATION: ABDOMEN
LOCATION: ABDOMEN;BACK

## 2020-02-08 ASSESSMENT — PAIN DESCRIPTION - DESCRIPTORS
DESCRIPTORS: DISCOMFORT
DESCRIPTORS: DISCOMFORT
DESCRIPTORS: ACHING;DISCOMFORT;SORE

## 2020-02-08 ASSESSMENT — PAIN DESCRIPTION - PAIN TYPE
TYPE: ACUTE PAIN

## 2020-02-08 ASSESSMENT — PAIN DESCRIPTION - ORIENTATION: ORIENTATION: LOWER

## 2020-02-08 NOTE — PROGRESS NOTES
Says she feels worse - more dysuria  Afebrile   Sinus tachycardia at present   Lungs mostly clear  Continue present RX  Will follow closely

## 2020-02-08 NOTE — H&P
[address]                  [address] [facility],  [facility]                              HISTORY AND PHYSICAL    PATIENT NAME: ED MIRANDA                        :        1935  MED REC NO:   [id number]                            ROOM:       5402  ACCOUNT NO:   [de-identified]                           ADMIT DATE: 2020  PROVIDER:     Arcadio Lanes, DO    The patient is an 78-year-old female. CHIEF COMPLAINT:  Recurrent urinary tract infection. HISTORY OF PRESENT ILLNESS:  The patient is an 78-year-old female with a  complicated past medical history including recurrent complicated urinary  tract infections/cystitis with ESBL, chronic kidney disease, chronic  obstructive pulmonary disease, osteoarthritis, history of  atrioventricular block and pacemaker who describes recurrent suprapubic  pain and dysuria with generalized weakness over the last several weeks. She has actually been to the ER three times in the last couple weeks for  this. Intermittent fever, dysuria, lower abdominal pain, generalized  weakness all present. In the past, she has required Infectious Disease  referral and intravenous antibiotics on multiple occasions. MEDICATIONS:  See chart. PAST MEDICAL HISTORY:  Recurrent urinary tract infection, degenerative  joint disease, chronic kidney disease, chronic obstructive pulmonary  disease, hypertension, osteoarthritis, history of atrioventricular  block, history of pacemaker. SOCIAL HISTORY:  Lives alone. No alcohol. No tobacco.    FAMILY HISTORY:  Not available. REVIEW OF SYSTEMS:  NEUROMUSCULAR:  Some headache and dizziness. No  diplopia. No facial numbness. RESPIRATORY:  Denies shortness of breath  or cough, intermittent fever, some night sweats. Some diaphoresis. CARDIOVASCULAR:  Denies chest pains or palpitations. GASTROINTESTINAL:   Suprapubic pain. Some nausea. No vomiting. No melena.

## 2020-02-08 NOTE — PROGRESS NOTES
ID Progress Note                [address] [facility] [address], [place], [address] [facility]            Phone [phone]     Fax [phone]      Chief complaint   Suprapubic pain    Subjective:  Complaints of on/off abdo pain  No fever/chills, no N/V/D  The patient is awake and alert. Tolerating medications. Reports no side effects. Afebrile. 10 ROS otherwise negative unless otherwise specified above. Objective:    Vitals:    02/08/20 0745   BP: (!) 104/57   Pulse: 84   Resp: 18   Temp: 97.2 °F (36.2 °C)   SpO2: 95%     Constitutional: The patient is awake, alert, and oriented. Skin: Warm and dry. No rashes were noted. No jaundice. HEENT: Eyes show round, and reactive pupils. Moist mucous membranes, no ulcerations, no thrush. Neck: Supple to movements. No lymphadenopathy. Chest: No use of accessory muscles to breathe. Symmetrical expansion. Auscultation reveals no wheezing, crackles, or rhonchi. Cardiovascular: S1 and S2 are rhythmic and regular. No murmurs appreciated. Abdomen: Positive bowel sounds to auscultation. Benign to palpation. No masses felt. No hepatosplenomegaly. Genitourinary: VAC Ng in place  Extremities: No clubbing, no cyanosis, no edema.   Musculoskeletal: Equal and symmetrical  Neurological: No focal  Lines: peripheral    Labs:  Recent Labs     02/06/20  0658 02/07/20  0435 02/08/20  0608   WBC 19.2* 15.5* 10.3   RBC 5.31 4.44 3.95   HGB 13.3 11.0* 9.8*   HCT 43.0 36.4 33.0*   MCV 81.0 82.0 83.5   MCH 25.0* 24.8* 24.8*   MCHC 30.9* 30.2* 29.7*   RDW 14.6 14.8 14.8    259 226   MPV 9.7 9.8 10.0     CMP:    Lab Results   Component Value Date     02/08/2020    K 3.6 02/08/2020    K 3.9 01/25/2020     02/08/2020    CO2 19 02/08/2020    BUN 14 02/08/2020    CREATININE 1.0 02/08/2020    GFRAA >60 02/08/2020    LABGLOM 53 02/08/2020    GLUCOSE 95 02/08/2020    PROT 5.7 02/08/2020    LABALBU 3.0 02/08/2020    CALCIUM 7.9 02/08/2020    BILITOT 0.4 02/08/2020

## 2020-02-09 LAB
ALBUMIN SERPL-MCNC: 2.7 G/DL (ref 3.5–5.2)
ALP BLD-CCNC: 82 U/L (ref 35–104)
ALT SERPL-CCNC: 12 U/L (ref 0–32)
ANION GAP SERPL CALCULATED.3IONS-SCNC: 9 MMOL/L (ref 7–16)
AST SERPL-CCNC: 17 U/L (ref 0–31)
BILIRUB SERPL-MCNC: 0.3 MG/DL (ref 0–1.2)
BUN BLDV-MCNC: 13 MG/DL (ref 8–23)
CALCIUM SERPL-MCNC: 7.8 MG/DL (ref 8.6–10.2)
CHLORIDE BLD-SCNC: 106 MMOL/L (ref 98–107)
CO2: 20 MMOL/L (ref 22–29)
CREAT SERPL-MCNC: 0.9 MG/DL (ref 0.5–1)
GFR AFRICAN AMERICAN: >60
GFR NON-AFRICAN AMERICAN: 60 ML/MIN/1.73
GLUCOSE BLD-MCNC: 86 MG/DL (ref 74–99)
HCT VFR BLD CALC: 32.8 % (ref 34–48)
HEMOGLOBIN: 9.7 G/DL (ref 11.5–15.5)
MCH RBC QN AUTO: 24.7 PG (ref 26–35)
MCHC RBC AUTO-ENTMCNC: 29.6 % (ref 32–34.5)
MCV RBC AUTO: 83.5 FL (ref 80–99.9)
ORGANISM: ABNORMAL
ORGANISM: ABNORMAL
PDW BLD-RTO: 14.6 FL (ref 11.5–15)
PLATELET # BLD: 216 E9/L (ref 130–450)
PMV BLD AUTO: 10.2 FL (ref 7–12)
POTASSIUM SERPL-SCNC: 3.7 MMOL/L (ref 3.5–5)
RBC # BLD: 3.93 E12/L (ref 3.5–5.5)
SODIUM BLD-SCNC: 135 MMOL/L (ref 132–146)
TOTAL PROTEIN: 5.4 G/DL (ref 6.4–8.3)
URINE CULTURE, ROUTINE: ABNORMAL
URINE CULTURE, ROUTINE: ABNORMAL
WBC # BLD: 7.2 E9/L (ref 4.5–11.5)

## 2020-02-09 PROCEDURE — 6370000000 HC RX 637 (ALT 250 FOR IP): Performed by: INTERNAL MEDICINE

## 2020-02-09 PROCEDURE — 85027 COMPLETE CBC AUTOMATED: CPT

## 2020-02-09 PROCEDURE — 6360000002 HC RX W HCPCS: Performed by: SPECIALIST

## 2020-02-09 PROCEDURE — 2580000003 HC RX 258: Performed by: INTERNAL MEDICINE

## 2020-02-09 PROCEDURE — 80053 COMPREHEN METABOLIC PANEL: CPT

## 2020-02-09 PROCEDURE — 36415 COLL VENOUS BLD VENIPUNCTURE: CPT

## 2020-02-09 PROCEDURE — 2580000003 HC RX 258: Performed by: SPECIALIST

## 2020-02-09 PROCEDURE — 6360000002 HC RX W HCPCS: Performed by: INTERNAL MEDICINE

## 2020-02-09 PROCEDURE — 1200000000 HC SEMI PRIVATE

## 2020-02-09 RX ADMIN — SODIUM CHLORIDE: 9 INJECTION, SOLUTION INTRAVENOUS at 16:13

## 2020-02-09 RX ADMIN — POLYVINYL ALCOHOL 1 DROP: 14 SOLUTION/ DROPS OPHTHALMIC at 09:05

## 2020-02-09 RX ADMIN — ASPIRIN 81 MG: 81 TABLET, COATED ORAL at 20:28

## 2020-02-09 RX ADMIN — ASPIRIN 81 MG: 81 TABLET, COATED ORAL at 09:05

## 2020-02-09 RX ADMIN — HYDROCODONE BITARTRATE AND ACETAMINOPHEN 1 TABLET: 5; 325 TABLET ORAL at 20:31

## 2020-02-09 RX ADMIN — LOSARTAN POTASSIUM 50 MG: 50 TABLET ORAL at 09:05

## 2020-02-09 RX ADMIN — Medication 2000 UNITS: at 09:05

## 2020-02-09 RX ADMIN — OMEPRAZOLE 20 MG: 20 CAPSULE, DELAYED RELEASE ORAL at 09:05

## 2020-02-09 RX ADMIN — ONDANSETRON 4 MG: 2 INJECTION INTRAMUSCULAR; INTRAVENOUS at 18:06

## 2020-02-09 RX ADMIN — TRIAMTERENE AND HYDROCHLOROTHIAZIDE 1 TABLET: 37.5; 25 TABLET ORAL at 09:05

## 2020-02-09 RX ADMIN — ENOXAPARIN SODIUM 30 MG: 30 INJECTION SUBCUTANEOUS at 09:05

## 2020-02-09 RX ADMIN — ACETAMINOPHEN 250 MG: 500 TABLET ORAL at 09:05

## 2020-02-09 RX ADMIN — ERTAPENEM SODIUM 1000 MG: 1 INJECTION, POWDER, LYOPHILIZED, FOR SOLUTION INTRAMUSCULAR; INTRAVENOUS at 16:13

## 2020-02-09 RX ADMIN — POLYVINYL ALCOHOL 1 DROP: 14 SOLUTION/ DROPS OPHTHALMIC at 20:28

## 2020-02-09 ASSESSMENT — PAIN DESCRIPTION - LOCATION
LOCATION: ABDOMEN
LOCATION: ABDOMEN;GENERALIZED

## 2020-02-09 ASSESSMENT — PAIN - FUNCTIONAL ASSESSMENT: PAIN_FUNCTIONAL_ASSESSMENT: PREVENTS OR INTERFERES WITH MANY ACTIVE NOT PASSIVE ACTIVITIES

## 2020-02-09 ASSESSMENT — PAIN SCALES - GENERAL
PAINLEVEL_OUTOF10: 7
PAINLEVEL_OUTOF10: 2
PAINLEVEL_OUTOF10: 0
PAINLEVEL_OUTOF10: 5
PAINLEVEL_OUTOF10: 0
PAINLEVEL_OUTOF10: 0

## 2020-02-09 ASSESSMENT — PAIN DESCRIPTION - ORIENTATION: ORIENTATION: LOWER

## 2020-02-09 ASSESSMENT — PAIN DESCRIPTION - PAIN TYPE
TYPE: ACUTE PAIN;CHRONIC PAIN
TYPE: ACUTE PAIN

## 2020-02-09 ASSESSMENT — PAIN DESCRIPTION - ONSET
ONSET: ON-GOING
ONSET: ON-GOING

## 2020-02-09 ASSESSMENT — PAIN DESCRIPTION - FREQUENCY
FREQUENCY: CONTINUOUS
FREQUENCY: CONTINUOUS

## 2020-02-09 ASSESSMENT — PAIN DESCRIPTION - DESCRIPTORS
DESCRIPTORS: ACHING;DISCOMFORT;SORE
DESCRIPTORS: ACHING;CONSTANT;DISCOMFORT

## 2020-02-09 ASSESSMENT — PAIN DESCRIPTION - PROGRESSION: CLINICAL_PROGRESSION: GRADUALLY WORSENING

## 2020-02-09 ASSESSMENT — PAIN SCALES - WONG BAKER: WONGBAKER_NUMERICALRESPONSE: 0

## 2020-02-09 NOTE — CONSULTS
INPATIENT CONSULTATION RECORD FOR  2/9/2020      [facility]  [address] [facility]. [facility], [address] [facility]  [phone]        REASON FOR CONSULTATION:  UTIs/Pelvic prolapse/Mixed urinary incontinence/OAB      HISTORY OF PRESENT ILLNESS:      The patient is a 80 y.o. female patient who was admitted recently with recurrent pain weakness and dysuria worsening over the last several weeks. She has known to our office having seen my father and Dr. Carter Leavitt in the past.  She has a history of pelvic prolapse. She has taken anticholinergic medication as well as tried a pessary in the past without success. She is not sexually active. She had a CT scan on 2/6/2020 which showed diffuse cystitis without hydronephrosis. She denies gross hematuria or stone disease. She has had severe and worsening incontinence. She wears sometimes 5 depends daily which are often soaked. She leaks with coughing. Urine culture on 2/6/2020 is growing Enterobacter and enterococcus. She is seeing infectious disease who are treating her with antibiotics. She feels better today. She does not have a Ng and is using a PureWick device.       Past Medical History:   Diagnosis Date    Acute gastroenteritis 11/27/2018    Arthritis     AVB (atrioventricular block) 6/20/2012    CAD (coronary artery disease)     Cancer (HCC)     colon treated with surgery 1990's    Chronic kidney disease, stage 3 ([address]     begining of stage 3    Complicated UTI (urinary tract infection) 10/8/2017    GERD (gastroesophageal reflux disease)     [place] (hard of hearing)     Hypertension     uncontrolled BP    Hypokalemia 11/27/2018    Psoriasis     Symptomatic bradycardia          Past Surgical History:   Procedure Laterality Date    APPENDECTOMY      BACK SURGERY      CARDIAC CATHETERIZATION  02/10/2016    Dr. Emily Giraldo single vessel disease, no stents placed     CHOLECYSTECTOMY      COLONOSCOPY      ECHO COMPLETE  6/20/2012         Deferred  Genitalia: Deferred    LABS:    Lab Results   Component Value Date    WBC 7.2 02/09/2020    HGB 9.7 (L) 02/09/2020    HCT 32.8 (L) 02/09/2020    MCV 83.5 02/09/2020     02/09/2020       Lab Results   Component Value Date    BUN 13 02/09/2020    CREATININE 0.9 02/09/2020           ASSESSMENT / PLAN:    UTIs/Pelvic prolapse/Mixed urinary incontinence/OAB  She is voiding comfortably and can be managed without a Ng at this time. Bladder scan residuals will be measured to make sure she is emptying. Her creatinine is stable. She will continue antibiotics per infectious disease recommendations. We discussed long-term bladder management options. She will need cystoscopy and a physical examination in the office. Options could certainly include another trial of a pessary, a LeFort procedure, intravesical Botox, or even a suprapubic tube. She would like to avoid a suprapubic tube or Ng long-term if at all possible. There is certainly no role for re-trialing her on anticholinergic medication which have been ineffective in the past.  She will need a follow-up appointment with Dr. Yariel Weiss upon discharge for further assessment and discussion on anti-incontinence procedures. We will continue to follow her during her hospital stay. Thank you for allowing me to assist in her care once again.   Sincerely,    Arlen Morin  4:02 PM  2/9/2020

## 2020-02-09 NOTE — PROGRESS NOTES
Feels better today  Less dysuria  Afebrile , vs stable  RRR  Lungs clear  Less suprapubic tenderness  Urology to see

## 2020-02-09 NOTE — PROGRESS NOTES
ALKPHOS 82 02/09/2020    AST 17 02/09/2020    ALT 12 02/09/2020          Microbiology :  No results for input(s): BC in the last 72 hours. No results for input(s): Leonce Meals in the last 72 hours. No results for input(s): LABURIN in the last 72 hours. No results for input(s): CULTRESP in the last 72 hours. No results for input(s): WNDABS in the last 72 hours. Radiology :  CT ABDOMEN PELVIS WO CONTRAST Additional Contrast? None   Final Result   Severe cystitis is  significantly thickened inflamed urinary bladder. There is no obstructing uropathy. 2 mm nonobstructing left kidney   stone is present.                 Assessment:  · Recurrent cystitis going back 5 years from E. coli that is very sensitive to ESBL E. coli then to Klebsiella to enterococcus the last few cultures  · Spastic bladder with overflow incontinence  ·  urine cultures from 2/6/2020- E.faecais (amp and van sensitive) and E.clocae   Plan:    · Continue invanz  · Consulted urology if pt can benefit from suprapubic catheter               Electronically signed by Mikki Juarez MD on 2/9/2020 at 3:21 PM

## 2020-02-10 LAB
ALBUMIN SERPL-MCNC: 2.9 G/DL (ref 3.5–5.2)
ALP BLD-CCNC: 90 U/L (ref 35–104)
ALT SERPL-CCNC: 12 U/L (ref 0–32)
ANION GAP SERPL CALCULATED.3IONS-SCNC: 12 MMOL/L (ref 7–16)
AST SERPL-CCNC: 20 U/L (ref 0–31)
BILIRUB SERPL-MCNC: 0.3 MG/DL (ref 0–1.2)
BUN BLDV-MCNC: 12 MG/DL (ref 8–23)
CALCIUM SERPL-MCNC: 8 MG/DL (ref 8.6–10.2)
CHLORIDE BLD-SCNC: 108 MMOL/L (ref 98–107)
CO2: 18 MMOL/L (ref 22–29)
CREAT SERPL-MCNC: 0.7 MG/DL (ref 0.5–1)
GFR AFRICAN AMERICAN: >60
GFR NON-AFRICAN AMERICAN: >60 ML/MIN/1.73
GLUCOSE BLD-MCNC: 90 MG/DL (ref 74–99)
HCT VFR BLD CALC: 35.3 % (ref 34–48)
HEMOGLOBIN: 10.8 G/DL (ref 11.5–15.5)
MCH RBC QN AUTO: 25.2 PG (ref 26–35)
MCHC RBC AUTO-ENTMCNC: 30.6 % (ref 32–34.5)
MCV RBC AUTO: 82.3 FL (ref 80–99.9)
PDW BLD-RTO: 14.6 FL (ref 11.5–15)
PLATELET # BLD: 263 E9/L (ref 130–450)
PMV BLD AUTO: 10.2 FL (ref 7–12)
POTASSIUM SERPL-SCNC: 3.5 MMOL/L (ref 3.5–5)
RBC # BLD: 4.29 E12/L (ref 3.5–5.5)
SODIUM BLD-SCNC: 138 MMOL/L (ref 132–146)
TOTAL PROTEIN: 5.8 G/DL (ref 6.4–8.3)
WBC # BLD: 7.4 E9/L (ref 4.5–11.5)

## 2020-02-10 PROCEDURE — 1200000000 HC SEMI PRIVATE

## 2020-02-10 PROCEDURE — 51798 US URINE CAPACITY MEASURE: CPT

## 2020-02-10 PROCEDURE — 2580000003 HC RX 258: Performed by: INTERNAL MEDICINE

## 2020-02-10 PROCEDURE — 6360000002 HC RX W HCPCS: Performed by: INTERNAL MEDICINE

## 2020-02-10 PROCEDURE — 6360000002 HC RX W HCPCS: Performed by: SPECIALIST

## 2020-02-10 PROCEDURE — 85027 COMPLETE CBC AUTOMATED: CPT

## 2020-02-10 PROCEDURE — 6370000000 HC RX 637 (ALT 250 FOR IP): Performed by: INTERNAL MEDICINE

## 2020-02-10 PROCEDURE — 2580000003 HC RX 258: Performed by: SPECIALIST

## 2020-02-10 PROCEDURE — 80053 COMPREHEN METABOLIC PANEL: CPT

## 2020-02-10 PROCEDURE — 36415 COLL VENOUS BLD VENIPUNCTURE: CPT

## 2020-02-10 PROCEDURE — 2580000003 HC RX 258

## 2020-02-10 RX ORDER — SODIUM CHLORIDE 0.9 % (FLUSH) 0.9 %
SYRINGE (ML) INJECTION
Status: COMPLETED
Start: 2020-02-10 | End: 2020-02-10

## 2020-02-10 RX ADMIN — Medication 2000 UNITS: at 08:25

## 2020-02-10 RX ADMIN — ONDANSETRON 4 MG: 2 INJECTION INTRAMUSCULAR; INTRAVENOUS at 03:17

## 2020-02-10 RX ADMIN — ERTAPENEM SODIUM 1000 MG: 1 INJECTION, POWDER, LYOPHILIZED, FOR SOLUTION INTRAMUSCULAR; INTRAVENOUS at 15:43

## 2020-02-10 RX ADMIN — LOSARTAN POTASSIUM 50 MG: 50 TABLET ORAL at 08:24

## 2020-02-10 RX ADMIN — ASPIRIN 81 MG: 81 TABLET, COATED ORAL at 08:24

## 2020-02-10 RX ADMIN — SODIUM CHLORIDE: 9 INJECTION, SOLUTION INTRAVENOUS at 15:46

## 2020-02-10 RX ADMIN — OMEPRAZOLE 20 MG: 20 CAPSULE, DELAYED RELEASE ORAL at 08:24

## 2020-02-10 RX ADMIN — POLYVINYL ALCOHOL 1 DROP: 14 SOLUTION/ DROPS OPHTHALMIC at 08:24

## 2020-02-10 RX ADMIN — ONDANSETRON 4 MG: 2 INJECTION INTRAMUSCULAR; INTRAVENOUS at 14:10

## 2020-02-10 RX ADMIN — PIPERACILLIN AND TAZOBACTAM 3.38 G: 3; .375 INJECTION, POWDER, LYOPHILIZED, FOR SOLUTION INTRAVENOUS at 20:07

## 2020-02-10 RX ADMIN — TRIAMTERENE AND HYDROCHLOROTHIAZIDE 1 TABLET: 37.5; 25 TABLET ORAL at 08:24

## 2020-02-10 RX ADMIN — ENOXAPARIN SODIUM 30 MG: 30 INJECTION SUBCUTANEOUS at 08:25

## 2020-02-10 RX ADMIN — ASPIRIN 81 MG: 81 TABLET, COATED ORAL at 20:22

## 2020-02-10 RX ADMIN — POLYVINYL ALCOHOL 1 DROP: 14 SOLUTION/ DROPS OPHTHALMIC at 20:23

## 2020-02-10 RX ADMIN — SODIUM CHLORIDE, PRESERVATIVE FREE 10 ML: 5 INJECTION INTRAVENOUS at 03:22

## 2020-02-10 RX ADMIN — HYDROCODONE BITARTRATE AND ACETAMINOPHEN 1 TABLET: 5; 325 TABLET ORAL at 20:22

## 2020-02-10 RX ADMIN — HYDROCODONE BITARTRATE AND ACETAMINOPHEN 1 TABLET: 5; 325 TABLET ORAL at 03:17

## 2020-02-10 RX ADMIN — HYDROCODONE BITARTRATE AND ACETAMINOPHEN 1 TABLET: 5; 325 TABLET ORAL at 14:10

## 2020-02-10 ASSESSMENT — PAIN DESCRIPTION - PAIN TYPE
TYPE: ACUTE PAIN;CHRONIC PAIN
TYPE: ACUTE PAIN
TYPE: ACUTE PAIN;CHRONIC PAIN

## 2020-02-10 ASSESSMENT — PAIN DESCRIPTION - PROGRESSION
CLINICAL_PROGRESSION: NOT CHANGED
CLINICAL_PROGRESSION: NOT CHANGED

## 2020-02-10 ASSESSMENT — PAIN SCALES - GENERAL
PAINLEVEL_OUTOF10: 7
PAINLEVEL_OUTOF10: 8
PAINLEVEL_OUTOF10: 0
PAINLEVEL_OUTOF10: 3
PAINLEVEL_OUTOF10: 7
PAINLEVEL_OUTOF10: 0
PAINLEVEL_OUTOF10: 4

## 2020-02-10 ASSESSMENT — PAIN DESCRIPTION - FREQUENCY
FREQUENCY: CONTINUOUS

## 2020-02-10 ASSESSMENT — PAIN DESCRIPTION - LOCATION
LOCATION: BACK;ABDOMEN
LOCATION: GENERALIZED
LOCATION: ABDOMEN

## 2020-02-10 ASSESSMENT — PAIN DESCRIPTION - ONSET
ONSET: ON-GOING

## 2020-02-10 ASSESSMENT — PAIN - FUNCTIONAL ASSESSMENT
PAIN_FUNCTIONAL_ASSESSMENT: PREVENTS OR INTERFERES WITH ALL ACTIVE AND SOME PASSIVE ACTIVITIES
PAIN_FUNCTIONAL_ASSESSMENT: PREVENTS OR INTERFERES SOME ACTIVE ACTIVITIES AND ADLS

## 2020-02-10 ASSESSMENT — PAIN DESCRIPTION - DESCRIPTORS
DESCRIPTORS: ACHING;DISCOMFORT;SORE
DESCRIPTORS: ACHING;CONSTANT;DISCOMFORT
DESCRIPTORS: ACHING;DISCOMFORT;DULL

## 2020-02-10 ASSESSMENT — PAIN DESCRIPTION - ORIENTATION: ORIENTATION: LOWER

## 2020-02-10 NOTE — PLAN OF CARE
PT ASSISTED UP TO BEDSIDE COMMODE TO SEE IF SHE CAN URINATE. PT HAD A SMALL BOWEL MOVEMENT BUT HAD NO URGE TO URINATE. PT ASSISTED BACK TO BED. CALL LIGHT WITHIN REACH.

## 2020-02-10 NOTE — PLAN OF CARE
COCCYX IS HAS BLANCHABLE REDNESS. PT ENCOUARGED TO TURN EVERY COUPLE HOURS TO KEEP PRESSURE OFF HER COCCYX. PT STATED THAT SHE HAS HAD 2 HIP SURGERIES AND IT IS TOO PAINFUL FOR HER TO LIE ON EITHER SIDE.

## 2020-02-10 NOTE — PROGRESS NOTES
2/10/2020 10:30 AM  Service: Urology  Group: [facility] (Abhishek/Burton/Sophia)    Alma Frost  [id number]    Subjective:    She is laying in bed  States she just had episode of emesis and nausea  She does have some abdominal discomfort  She complains of dysuria  No family is present  She is alert and oriented     Review of Systems  Constitutional: No fever or chills   Respiratory: negative for cough and hemoptysis  Cardiovascular: negative for chest pain and dyspnea  Gastrointestinal: positive for abdominal pain, nausea and vomiting   : See above  Derm: negative for rash and skin lesion(s)  Neurological: negative for seizures and tremors  Musculoskeletal: Negative    Psychiatric: Negative   All other reviews are negative      Scheduled Meds:   ertapenem (INVANZ) IVPB  1 g Intravenous Q24H    aspirin  81 mg Oral BID    polyvinyl alcohol  1 drop Both Eyes BID    vitamin D  2,000 Units Oral Daily    omeprazole  20 mg Oral Daily    triamterene-hydrochlorothiazide  1 tablet Oral QAM    enoxaparin  30 mg Subcutaneous Daily    losartan  50 mg Oral Daily       Objective:  Vitals:    02/10/20 0730   BP: (!) 127/58   Pulse: 78   Resp: 16   Temp: 96.8 °F (36 °C)   SpO2: 93%         Allergies: Codeine; Amlodipine; Augmentin [amoxicillin-pot clavulanate]; Bactrim; Benadryl [diphenhydramine]; Brovana [arformoterol]; Cardizem [diltiazem hcl];  Doxazosin; Ipratropium; Macrodantin [nitrofurantoin macrocrystal]; and Verapamil    General Appearance: alert and oriented to person, place and time and in no acute distress  Skin: no rash or erythema  Head: normocephalic and atraumatic  Pulmonary/Chest: normal air movement, no respiratory distress  Abdomen: soft, diffuse tenderness, non-distended  Genitourinary: no wilde   Extremities: no cyanosis, clubbing or edema         Labs:     Recent Labs     02/10/20  0500      K 3.5   *   CO2 18*   BUN 12   CREATININE 0.7   GLUCOSE 90   CALCIUM 8.0*       Lab Results   Component Value Date    HGB 10.8 02/10/2020    HCT 35.3 02/10/2020     9/2020  7:05 AM - Ruben, James Incoming Results From [facility]     Specimen Information: Urine, clean catch        Component Collected Lab   Organism Abnormal  02/06/2020  6:25 AM  - [facility]   Enterococcus faecalis    Urine Culture, Routine 02/06/2020  6:25 AM  - [facility]   >100,000 CFU/ml    Organism Abnormal  02/06/2020  6:25 AM  - [facility]   Enterobacter cloacae    Urine Culture, Routine 02/06/2020  6:25 AM  - [facility]   <10,000 CFU/ml    Testing Performed By     Lab - [address] [place] [address] Name Director Address Valid Date Range   49- - [address] [facility] Noelle Csatro  [facility].   Prudence Early [address] 12/03/19 1502-Present   Narrative   Performed by: [address] [place] [address] Lab   Source: URINE       Site:              Susceptibility     Enterococcus faecalis (1)     Antibiotic Interpretation ALEKSANDER Status    ampicillin Sensitive <=^2 mcg/mL     doxycycline Resistant >=^16 mcg/mL     gentamicin-syn Resistant SYN-R mcg/mL     nitrofurantoin Sensitive <=^16 mcg/mL     vancomycin Sensitive ^1 mcg/mL     Enterobacter cloacae (2)     Antibiotic Interpretation ALEKSANDER Status    ceFAZolin Resistant >=^64 mcg/mL     cefepime Sensitive <=^0.12 mcg/mL     ertapenem Sensitive <=^0.12 mcg/mL     gentamicin Sensitive <=^1 mcg/mL     levofloxacin Sensitive <=^0.12 mcg/mL     nitrofurantoin Intermediate ^64 mcg/mL     piperacillin-tazobactam Sensitive <=^4 mcg/mL     trimethoprim-sulfamethoxazole Sensitive <=^20 mcg/mL           Assessment/Plan:  UTI (Enterobacter cloacae, Enterococcus faecalis)  Pelvic Prolapse  Mixed Urinary Incontinence   OAB     Bladder PVR ordered, if greater than 300ml will need wilde catheter inserted   Creatinine remains stable   Antibiotics per ID, currently on Invanz  She will require outpatient follow up for

## 2020-02-10 NOTE — PROGRESS NOTES
ALKPHOS 90 02/10/2020    AST 20 02/10/2020    ALT 12 02/10/2020          Microbiology :  No results for input(s): BC in the last 72 hours. No results for input(s): Raymona Blind in the last 72 hours. No results for input(s): LABURIN in the last 72 hours. No results for input(s): CULTRESP in the last 72 hours. No results for input(s): WNDABS in the last 72 hours. Radiology :  CT ABDOMEN PELVIS WO CONTRAST Additional Contrast? None   Final Result   Severe cystitis is  significantly thickened inflamed urinary bladder. There is no obstructing uropathy. 2 mm nonobstructing left kidney   stone is present.                 Assessment:  · Recurrent cystitis going back 5 years from E. coli that is very sensitive to ESBL E. coli then to Klebsiella to enterococcus the last few cultures  · Spastic bladder with overflow incontinence  ·  urine cultures from 2/6/2020- E.faecais (amp and van sensitive) and E.clocae   Plan:    · Continue with zosyn  · Consulted urology if pt can benefit from suprapubic catheter               Electronically signed by Nigel Barrera MD on 2/10/2020 at 6:54 PM

## 2020-02-11 LAB
ALBUMIN SERPL-MCNC: 3.2 G/DL (ref 3.5–5.2)
ALP BLD-CCNC: 98 U/L (ref 35–104)
ALT SERPL-CCNC: 13 U/L (ref 0–32)
ANION GAP SERPL CALCULATED.3IONS-SCNC: 14 MMOL/L (ref 7–16)
AST SERPL-CCNC: 23 U/L (ref 0–31)
BILIRUB SERPL-MCNC: 0.3 MG/DL (ref 0–1.2)
BLOOD CULTURE, ROUTINE: NORMAL
BUN BLDV-MCNC: 12 MG/DL (ref 8–23)
CALCIUM SERPL-MCNC: 8.5 MG/DL (ref 8.6–10.2)
CHLORIDE BLD-SCNC: 105 MMOL/L (ref 98–107)
CO2: 20 MMOL/L (ref 22–29)
CREAT SERPL-MCNC: 0.9 MG/DL (ref 0.5–1)
CULTURE, BLOOD 2: NORMAL
GFR AFRICAN AMERICAN: >60
GFR NON-AFRICAN AMERICAN: 60 ML/MIN/1.73
GLUCOSE BLD-MCNC: 103 MG/DL (ref 74–99)
HCT VFR BLD CALC: 36.8 % (ref 34–48)
HEMOGLOBIN: 11.3 G/DL (ref 11.5–15.5)
MCH RBC QN AUTO: 25.1 PG (ref 26–35)
MCHC RBC AUTO-ENTMCNC: 30.7 % (ref 32–34.5)
MCV RBC AUTO: 81.6 FL (ref 80–99.9)
PDW BLD-RTO: 14.7 FL (ref 11.5–15)
PLATELET # BLD: 293 E9/L (ref 130–450)
PMV BLD AUTO: 10 FL (ref 7–12)
POTASSIUM SERPL-SCNC: 3.5 MMOL/L (ref 3.5–5)
RBC # BLD: 4.51 E12/L (ref 3.5–5.5)
SODIUM BLD-SCNC: 139 MMOL/L (ref 132–146)
TOTAL PROTEIN: 6.5 G/DL (ref 6.4–8.3)
WBC # BLD: 7.3 E9/L (ref 4.5–11.5)

## 2020-02-11 PROCEDURE — 6360000002 HC RX W HCPCS: Performed by: INTERNAL MEDICINE

## 2020-02-11 PROCEDURE — 2580000003 HC RX 258: Performed by: INTERNAL MEDICINE

## 2020-02-11 PROCEDURE — 1200000000 HC SEMI PRIVATE

## 2020-02-11 PROCEDURE — 36415 COLL VENOUS BLD VENIPUNCTURE: CPT

## 2020-02-11 PROCEDURE — 6370000000 HC RX 637 (ALT 250 FOR IP): Performed by: INTERNAL MEDICINE

## 2020-02-11 PROCEDURE — 85027 COMPLETE CBC AUTOMATED: CPT

## 2020-02-11 PROCEDURE — 6370000000 HC RX 637 (ALT 250 FOR IP)

## 2020-02-11 PROCEDURE — 2580000003 HC RX 258: Performed by: SPECIALIST

## 2020-02-11 PROCEDURE — 6360000002 HC RX W HCPCS: Performed by: SPECIALIST

## 2020-02-11 PROCEDURE — 80053 COMPREHEN METABOLIC PANEL: CPT

## 2020-02-11 RX ORDER — ASPIRIN 81 MG/1
TABLET, CHEWABLE ORAL
Status: COMPLETED
Start: 2020-02-11 | End: 2020-02-11

## 2020-02-11 RX ADMIN — SODIUM CHLORIDE 100 ML: 9 INJECTION, SOLUTION INTRAVENOUS at 00:00

## 2020-02-11 RX ADMIN — ASPIRIN 81 MG: 81 TABLET, COATED ORAL at 08:07

## 2020-02-11 RX ADMIN — HYDROCODONE BITARTRATE AND ACETAMINOPHEN 1 TABLET: 5; 325 TABLET ORAL at 14:43

## 2020-02-11 RX ADMIN — SODIUM CHLORIDE 100 ML: 9 INJECTION, SOLUTION INTRAVENOUS at 08:12

## 2020-02-11 RX ADMIN — LOSARTAN POTASSIUM 50 MG: 50 TABLET ORAL at 08:07

## 2020-02-11 RX ADMIN — ENOXAPARIN SODIUM 30 MG: 30 INJECTION SUBCUTANEOUS at 08:07

## 2020-02-11 RX ADMIN — PIPERACILLIN AND TAZOBACTAM 3.38 G: 3; .375 INJECTION, POWDER, LYOPHILIZED, FOR SOLUTION INTRAVENOUS at 20:03

## 2020-02-11 RX ADMIN — Medication 2000 UNITS: at 08:07

## 2020-02-11 RX ADMIN — POLYVINYL ALCOHOL 1 DROP: 14 SOLUTION/ DROPS OPHTHALMIC at 08:08

## 2020-02-11 RX ADMIN — SODIUM CHLORIDE: 9 INJECTION, SOLUTION INTRAVENOUS at 20:05

## 2020-02-11 RX ADMIN — PIPERACILLIN AND TAZOBACTAM 3.38 G: 3; .375 INJECTION, POWDER, LYOPHILIZED, FOR SOLUTION INTRAVENOUS at 11:39

## 2020-02-11 RX ADMIN — PIPERACILLIN AND TAZOBACTAM 3.38 G: 3; .375 INJECTION, POWDER, LYOPHILIZED, FOR SOLUTION INTRAVENOUS at 03:15

## 2020-02-11 RX ADMIN — TRIAMTERENE AND HYDROCHLOROTHIAZIDE 1 TABLET: 37.5; 25 TABLET ORAL at 08:07

## 2020-02-11 RX ADMIN — OMEPRAZOLE 20 MG: 20 CAPSULE, DELAYED RELEASE ORAL at 08:07

## 2020-02-11 RX ADMIN — ONDANSETRON 4 MG: 2 INJECTION INTRAMUSCULAR; INTRAVENOUS at 14:46

## 2020-02-11 RX ADMIN — ASPIRIN 81 MG 81 MG: 81 TABLET ORAL at 20:03

## 2020-02-11 RX ADMIN — POLYVINYL ALCOHOL 1 DROP: 14 SOLUTION/ DROPS OPHTHALMIC at 20:03

## 2020-02-11 ASSESSMENT — PAIN SCALES - GENERAL
PAINLEVEL_OUTOF10: 0
PAINLEVEL_OUTOF10: 0
PAINLEVEL_OUTOF10: 7
PAINLEVEL_OUTOF10: 0
PAINLEVEL_OUTOF10: 0

## 2020-02-11 NOTE — PROGRESS NOTES
Dr. Eamon Gold,    Your patient is on a medication that requires a renal dose adjustment. Renal Function Assessment:    Date Body Weight SCr CrCl Dialysis status   2/11/2020 72.6 kg 0.9 45 ml/min No       Pharmacy has renally dose-adjusted the following medication(s):    Date Medication Original Dosing Regimen New Dosing Regimen   2/11/2020 Enoxaparin 30 mg daily 40 mg daily           These changes were made per protocol according to the [facility] Renal Function-Based Dose Adjustments Policy    *Please note this dose may need readjusted if your patient's renal function significantly improves. Please contact pharmacy with any questions regarding these changes. Nakia Anand, PharmD 2/11/2020 3:13 PM

## 2020-02-11 NOTE — PROGRESS NOTES
[facility] PROGRESS NOTE                                                                       2/11/2020          SUBJECTIVE:    Afebrile  pvr 125cc  Pt continues with incontinence not terribly uncomfortable with pads  Pt has failed conservative rx for incontinence    OBJECTIVE:    /84   Pulse 81   Temp 97.6 °F (36.4 °C) (Temporal)   Resp 16   Ht 5' 4\" (1.626 m)   Wt 160 lb (72.6 kg)   SpO2 (!) 88% Comment: refused o2  BMI 27.46 kg/m²     PHYSICAL EXAMINATION:  Skin: dry, without rashes  Respirations: non-labored, intact  Abdomen: soft, non-tender, non-distended      Lab Results   Component Value Date    WBC 7.3 02/11/2020    HGB 11.3 (L) 02/11/2020    HCT 36.8 02/11/2020    MCV 81.6 02/11/2020     02/11/2020       Lab Results   Component Value Date    CREATININE 0.9 02/11/2020       No results found for: PSA    REVIEW OF SYSTEMS:    CONSTITUTIONAL: negative  HEENT: negative  HEMATOLOGIC: negative  ENDOCRINE: negative  RESPIRATORY: negative  CV: negative  GI: negative  NEURO: negative  ORTHOPEDICS: negative  PSYCHIATRIC: negative  : as above    PAST FAMILY HISTORY:    Family History   Problem Relation Age of Onset    Heart Disease Mother     Heart Attack Mother     Other Father     Asthma Father      PAST SOCIAL HISTORY:    Social History     Socioeconomic History    Marital status:       Spouse name: None    Number of children: 2    Years of education: None    Highest education level: None   Occupational History    None   Social Needs    Financial resource strain: None    Food insecurity:     Worry: None     Inability: None    Transportation needs:     Medical: None     Non-medical: None   Tobacco Use    Smoking status: Former Smoker     Packs/day: 1.00     Years: 17.00     Pack years: 17.00     Types: Cigarettes     Start date: 7/24/1965     Last attempt to

## 2020-02-12 LAB
ALBUMIN SERPL-MCNC: 3.3 G/DL (ref 3.5–5.2)
ALP BLD-CCNC: 98 U/L (ref 35–104)
ALT SERPL-CCNC: 15 U/L (ref 0–32)
ANION GAP SERPL CALCULATED.3IONS-SCNC: 13 MMOL/L (ref 7–16)
AST SERPL-CCNC: 26 U/L (ref 0–31)
BILIRUB SERPL-MCNC: 0.3 MG/DL (ref 0–1.2)
BUN BLDV-MCNC: 10 MG/DL (ref 8–23)
CALCIUM SERPL-MCNC: 8.7 MG/DL (ref 8.6–10.2)
CHLORIDE BLD-SCNC: 102 MMOL/L (ref 98–107)
CO2: 23 MMOL/L (ref 22–29)
CREAT SERPL-MCNC: 0.9 MG/DL (ref 0.5–1)
GFR AFRICAN AMERICAN: >60
GFR NON-AFRICAN AMERICAN: 60 ML/MIN/1.73
GLUCOSE BLD-MCNC: 110 MG/DL (ref 74–99)
HCT VFR BLD CALC: 36.7 % (ref 34–48)
HEMOGLOBIN: 11.3 G/DL (ref 11.5–15.5)
MCH RBC QN AUTO: 25.1 PG (ref 26–35)
MCHC RBC AUTO-ENTMCNC: 30.8 % (ref 32–34.5)
MCV RBC AUTO: 81.4 FL (ref 80–99.9)
PDW BLD-RTO: 14.9 FL (ref 11.5–15)
PLATELET # BLD: 298 E9/L (ref 130–450)
PMV BLD AUTO: 10 FL (ref 7–12)
POTASSIUM SERPL-SCNC: 3.2 MMOL/L (ref 3.5–5)
RBC # BLD: 4.51 E12/L (ref 3.5–5.5)
SODIUM BLD-SCNC: 138 MMOL/L (ref 132–146)
TOTAL PROTEIN: 6.4 G/DL (ref 6.4–8.3)
WBC # BLD: 6.6 E9/L (ref 4.5–11.5)

## 2020-02-12 PROCEDURE — 1200000000 HC SEMI PRIVATE

## 2020-02-12 PROCEDURE — 6360000002 HC RX W HCPCS: Performed by: SPECIALIST

## 2020-02-12 PROCEDURE — 6370000000 HC RX 637 (ALT 250 FOR IP): Performed by: SPECIALIST

## 2020-02-12 PROCEDURE — 2580000003 HC RX 258: Performed by: INTERNAL MEDICINE

## 2020-02-12 PROCEDURE — 6360000002 HC RX W HCPCS: Performed by: INTERNAL MEDICINE

## 2020-02-12 PROCEDURE — 36415 COLL VENOUS BLD VENIPUNCTURE: CPT

## 2020-02-12 PROCEDURE — 80053 COMPREHEN METABOLIC PANEL: CPT

## 2020-02-12 PROCEDURE — 2580000003 HC RX 258

## 2020-02-12 PROCEDURE — 85027 COMPLETE CBC AUTOMATED: CPT

## 2020-02-12 PROCEDURE — 51798 US URINE CAPACITY MEASURE: CPT

## 2020-02-12 PROCEDURE — 6370000000 HC RX 637 (ALT 250 FOR IP): Performed by: INTERNAL MEDICINE

## 2020-02-12 PROCEDURE — 2580000003 HC RX 258: Performed by: SPECIALIST

## 2020-02-12 RX ORDER — SODIUM CHLORIDE 0.9 % (FLUSH) 0.9 %
SYRINGE (ML) INJECTION
Status: COMPLETED
Start: 2020-02-12 | End: 2020-02-12

## 2020-02-12 RX ORDER — LEVOFLOXACIN 500 MG/1
500 TABLET, FILM COATED ORAL DAILY
Status: DISCONTINUED | OUTPATIENT
Start: 2020-02-12 | End: 2020-02-13 | Stop reason: HOSPADM

## 2020-02-12 RX ORDER — POTASSIUM CHLORIDE 20 MEQ/1
40 TABLET, EXTENDED RELEASE ORAL ONCE
Status: COMPLETED | OUTPATIENT
Start: 2020-02-12 | End: 2020-02-12

## 2020-02-12 RX ORDER — AMOXICILLIN 500 MG/1
500 CAPSULE ORAL EVERY 8 HOURS SCHEDULED
Qty: 30 CAPSULE | Refills: 0 | Status: SHIPPED | OUTPATIENT
Start: 2020-02-12 | End: 2020-02-22

## 2020-02-12 RX ORDER — AMOXICILLIN 250 MG/1
500 CAPSULE ORAL EVERY 8 HOURS SCHEDULED
Status: DISCONTINUED | OUTPATIENT
Start: 2020-02-12 | End: 2020-02-13 | Stop reason: HOSPADM

## 2020-02-12 RX ORDER — LEVOFLOXACIN 500 MG/1
500 TABLET, FILM COATED ORAL DAILY
Qty: 10 TABLET | Refills: 0 | Status: SHIPPED | OUTPATIENT
Start: 2020-02-12 | End: 2020-02-22

## 2020-02-12 RX ADMIN — ONDANSETRON 4 MG: 2 INJECTION INTRAMUSCULAR; INTRAVENOUS at 15:33

## 2020-02-12 RX ADMIN — LOSARTAN POTASSIUM 50 MG: 50 TABLET ORAL at 08:18

## 2020-02-12 RX ADMIN — ASPIRIN 81 MG: 81 TABLET, COATED ORAL at 20:01

## 2020-02-12 RX ADMIN — PIPERACILLIN AND TAZOBACTAM 3.38 G: 3; .375 INJECTION, POWDER, LYOPHILIZED, FOR SOLUTION INTRAVENOUS at 08:22

## 2020-02-12 RX ADMIN — SODIUM CHLORIDE 100 ML: 9 INJECTION, SOLUTION INTRAVENOUS at 13:02

## 2020-02-12 RX ADMIN — POTASSIUM CHLORIDE 40 MEQ: 1500 TABLET, EXTENDED RELEASE ORAL at 13:02

## 2020-02-12 RX ADMIN — HYDROCODONE BITARTRATE AND ACETAMINOPHEN 1 TABLET: 5; 325 TABLET ORAL at 15:33

## 2020-02-12 RX ADMIN — POLYVINYL ALCOHOL 1 DROP: 14 SOLUTION/ DROPS OPHTHALMIC at 20:01

## 2020-02-12 RX ADMIN — AMOXICILLIN 500 MG: 250 CAPSULE ORAL at 23:30

## 2020-02-12 RX ADMIN — ASPIRIN 81 MG: 81 TABLET, COATED ORAL at 08:18

## 2020-02-12 RX ADMIN — ENOXAPARIN SODIUM 40 MG: 40 INJECTION SUBCUTANEOUS at 08:17

## 2020-02-12 RX ADMIN — SODIUM CHLORIDE: 9 INJECTION, SOLUTION INTRAVENOUS at 20:03

## 2020-02-12 RX ADMIN — TRIAMTERENE AND HYDROCHLOROTHIAZIDE 1 TABLET: 37.5; 25 TABLET ORAL at 08:18

## 2020-02-12 RX ADMIN — POLYVINYL ALCOHOL 1 DROP: 14 SOLUTION/ DROPS OPHTHALMIC at 08:17

## 2020-02-12 RX ADMIN — Medication: at 16:13

## 2020-02-12 RX ADMIN — Medication 2000 UNITS: at 08:18

## 2020-02-12 RX ADMIN — OMEPRAZOLE 20 MG: 20 CAPSULE, DELAYED RELEASE ORAL at 08:18

## 2020-02-12 ASSESSMENT — PAIN DESCRIPTION - ORIENTATION: ORIENTATION: LOWER

## 2020-02-12 ASSESSMENT — PAIN SCALES - GENERAL
PAINLEVEL_OUTOF10: 4
PAINLEVEL_OUTOF10: 0
PAINLEVEL_OUTOF10: 7

## 2020-02-12 ASSESSMENT — PAIN DESCRIPTION - LOCATION: LOCATION: ABDOMEN

## 2020-02-12 ASSESSMENT — PAIN DESCRIPTION - PAIN TYPE: TYPE: ACUTE PAIN

## 2020-02-12 NOTE — PROGRESS NOTES
Still some dysuria  Afebrile , vs stable  RRR  Lungs clear  Abdomen soft without tenderness  ID to decide about duration of parenteral antibiotics , etc

## 2020-02-12 NOTE — PROGRESS NOTES
CALCIUM 8.7       Lab Results   Component Value Date    HGB 11.3 02/12/2020    HCT 36.7 02/12/2020         Assessment/Plan:  UTI (Enterobacter cloacae, Enterococcus faecalis)  Pelvic Prolapse  Mixed Urinary Incontinence   OAB     Creatinine stable  Continue antibiotics per ID  Spoke with Kathleen Garay about outpatient evaluation  She will follow up with Dr. Christiano Myers Abhishek, explained that she will likely need office cystoscopy done as well as pelvic exam.  She is not interested in long-term Ng or suprapubic tube  She states that prior to admission her urinary symptoms were not bothersome to her.  She would like to have her acute infection treated and then reevaluate her symptoms as an outpatient in the office  No further  interventions planned at this time  Thank you for allowing us to participate in her care       AYAN Escobedo - CNP   [facility]  I intervieqwed and examined the pt   Im agree with the above note and plan  Will get PVR bladder scan this evening

## 2020-02-13 VITALS
RESPIRATION RATE: 18 BRPM | HEIGHT: 64 IN | DIASTOLIC BLOOD PRESSURE: 65 MMHG | OXYGEN SATURATION: 95 % | WEIGHT: 160 LBS | HEART RATE: 81 BPM | TEMPERATURE: 96.7 F | BODY MASS INDEX: 27.31 KG/M2 | SYSTOLIC BLOOD PRESSURE: 147 MMHG

## 2020-02-13 LAB
ALBUMIN SERPL-MCNC: 3.4 G/DL (ref 3.5–5.2)
ALP BLD-CCNC: 97 U/L (ref 35–104)
ALT SERPL-CCNC: 16 U/L (ref 0–32)
ANION GAP SERPL CALCULATED.3IONS-SCNC: 12 MMOL/L (ref 7–16)
AST SERPL-CCNC: 27 U/L (ref 0–31)
BILIRUB SERPL-MCNC: 0.3 MG/DL (ref 0–1.2)
BUN BLDV-MCNC: 10 MG/DL (ref 8–23)
CALCIUM SERPL-MCNC: 9.4 MG/DL (ref 8.6–10.2)
CHLORIDE BLD-SCNC: 102 MMOL/L (ref 98–107)
CO2: 25 MMOL/L (ref 22–29)
CREAT SERPL-MCNC: 0.8 MG/DL (ref 0.5–1)
GFR AFRICAN AMERICAN: >60
GFR NON-AFRICAN AMERICAN: >60 ML/MIN/1.73
GLUCOSE BLD-MCNC: 103 MG/DL (ref 74–99)
HCT VFR BLD CALC: 38.1 % (ref 34–48)
HEMOGLOBIN: 11.5 G/DL (ref 11.5–15.5)
MCH RBC QN AUTO: 24.6 PG (ref 26–35)
MCHC RBC AUTO-ENTMCNC: 30.2 % (ref 32–34.5)
MCV RBC AUTO: 81.6 FL (ref 80–99.9)
PDW BLD-RTO: 15.1 FL (ref 11.5–15)
PLATELET # BLD: 295 E9/L (ref 130–450)
PMV BLD AUTO: 10.1 FL (ref 7–12)
POTASSIUM SERPL-SCNC: 3.8 MMOL/L (ref 3.5–5)
RBC # BLD: 4.67 E12/L (ref 3.5–5.5)
SODIUM BLD-SCNC: 139 MMOL/L (ref 132–146)
TOTAL PROTEIN: 6.7 G/DL (ref 6.4–8.3)
WBC # BLD: 6.9 E9/L (ref 4.5–11.5)

## 2020-02-13 PROCEDURE — 80053 COMPREHEN METABOLIC PANEL: CPT

## 2020-02-13 PROCEDURE — 51798 US URINE CAPACITY MEASURE: CPT

## 2020-02-13 PROCEDURE — 85027 COMPLETE CBC AUTOMATED: CPT

## 2020-02-13 PROCEDURE — 6370000000 HC RX 637 (ALT 250 FOR IP): Performed by: SPECIALIST

## 2020-02-13 PROCEDURE — 36415 COLL VENOUS BLD VENIPUNCTURE: CPT

## 2020-02-13 PROCEDURE — 6360000002 HC RX W HCPCS: Performed by: INTERNAL MEDICINE

## 2020-02-13 PROCEDURE — 6370000000 HC RX 637 (ALT 250 FOR IP): Performed by: INTERNAL MEDICINE

## 2020-02-13 RX ADMIN — LOSARTAN POTASSIUM 50 MG: 50 TABLET ORAL at 07:52

## 2020-02-13 RX ADMIN — AMOXICILLIN 500 MG: 250 CAPSULE ORAL at 14:43

## 2020-02-13 RX ADMIN — Medication 2000 UNITS: at 07:52

## 2020-02-13 RX ADMIN — POLYVINYL ALCOHOL 1 DROP: 14 SOLUTION/ DROPS OPHTHALMIC at 07:52

## 2020-02-13 RX ADMIN — ENOXAPARIN SODIUM 40 MG: 40 INJECTION SUBCUTANEOUS at 07:52

## 2020-02-13 RX ADMIN — OMEPRAZOLE 20 MG: 20 CAPSULE, DELAYED RELEASE ORAL at 07:52

## 2020-02-13 RX ADMIN — AMOXICILLIN 500 MG: 250 CAPSULE ORAL at 07:24

## 2020-02-13 RX ADMIN — TRIAMTERENE AND HYDROCHLOROTHIAZIDE 1 TABLET: 37.5; 25 TABLET ORAL at 07:52

## 2020-02-13 RX ADMIN — LEVOFLOXACIN 500 MG: 500 TABLET, FILM COATED ORAL at 07:52

## 2020-02-13 RX ADMIN — ASPIRIN 81 MG: 81 TABLET, COATED ORAL at 07:52

## 2020-02-13 NOTE — PROGRESS NOTES
2/13/2020 3:41 PM  Service: Urology  Group: [facility] (Abhishek/Burton)    Andree Major  [id number]    Subjective: The patient is frustrated by her recurrent infections particularly since she had her hip surgery in October  Advised her that her incomplete emptying is accentuating her infections this point in time try to manage her with medications most likely will place her on an alpha-blocker to see if we can promote bladder emptying but she very well may need indwelling Ng and suprapubic tube at some point but that decision could be made at a later time    Review of Systems  Respiratory: negative  Cardiovascular: negative  Gastrointestinal: negative  Hematologic/lymphatic: negative  Musculoskeletal:negative  Neurological: negative  Endocrine: negative    Scheduled Meds:   amoxicillin  500 mg Oral 3 times per day    levofloxacin  500 mg Oral Daily    enoxaparin  40 mg Subcutaneous Daily    aspirin  81 mg Oral BID    polyvinyl alcohol  1 drop Both Eyes BID    vitamin D  2,000 Units Oral Daily    omeprazole  20 mg Oral Daily    triamterene-hydrochlorothiazide  1 tablet Oral QAM    losartan  50 mg Oral Daily       Objective:  Vitals:    02/13/20 0715   BP: (!) 147/65   Pulse: 81   Resp: 18   Temp: 96.7 °F (35.9 °C)   SpO2: 95%         Allergies: Codeine; Amlodipine; Augmentin [amoxicillin-pot clavulanate]; Bactrim; Benadryl [diphenhydramine]; Brovana [arformoterol]; Cardizem [diltiazem hcl];  Doxazosin; Ipratropium; Macrodantin [nitrofurantoin macrocrystal]; and Verapamil    General Appearance: alert and oriented to person, place and time and in no acute distress  Skin: no rash or erythema  Head: normocephalic and atraumatic  Pulmonary/Chest: clear to auscultation bilaterally- no wheezes, rales or rhonchi, normal air movement, no respiratory distress and no chest wall tenderness  Abdomen: soft, non-tender, non-distended, normal bowel sounds, no masses or organomegaly and no inguinal adenopathy  Genitourinary:

## 2020-02-13 NOTE — PROGRESS NOTES
Patient tried to use bedside commode, but was unable to void. Bladder scanned her for 348ml. While I returned the bladder scanner, patient tried to void again using the bedside commode. She voided 200ml.

## 2020-02-13 NOTE — PROGRESS NOTES
Patient lost her IV site and stated she did not want a new one because it's only fluids and she believes she is going home tomorrow.

## 2020-02-13 NOTE — PROGRESS NOTES
ID Progress Note                [address] [facility] [address], [place], [address] [facility]            Phone [phone]     Fax [phone]      Chief complaint   Suprapubic pain    Subjective:  Complaints of on/off abdo pain  No fever/chills, no N/V/D  The patient is awake and alert. Tolerating medications. Reports no side effects. Afebrile. 10 ROS otherwise negative unless otherwise specified above. Objective:    Vitals:    02/13/20 0715   BP: (!) 147/65   Pulse: 81   Resp: 18   Temp: 96.7 °F (35.9 °C)   SpO2: 95%     Constitutional: The patient is awake, alert, and oriented. Skin: Warm and dry. No rashes were noted. No jaundice. HEENT: Eyes show round, and reactive pupils. Moist mucous membranes, no ulcerations, no thrush. Neck: Supple to movements. No lymphadenopathy. Chest: No use of accessory muscles to breathe. Symmetrical expansion. Auscultation reveals no wheezing, crackles, or rhonchi. Cardiovascular: S1 and S2 are rhythmic and regular. No murmurs appreciated. Abdomen: Positive bowel sounds to auscultation. Benign to palpation. No masses felt. No hepatosplenomegaly. Genitourinary: VAC Ng in place  Extremities: No clubbing, no cyanosis, no edema.   Musculoskeletal: Equal and symmetrical  Neurological: No focal  Lines: peripheral    Labs:  Recent Labs     02/11/20  0502 02/12/20  0508 02/13/20  0512   WBC 7.3 6.6 6.9   RBC 4.51 4.51 4.67   HGB 11.3* 11.3* 11.5   HCT 36.8 36.7 38.1   MCV 81.6 81.4 81.6   MCH 25.1* 25.1* 24.6*   MCHC 30.7* 30.8* 30.2*   RDW 14.7 14.9 15.1*    298 295   MPV 10.0 10.0 10.1     CMP:    Lab Results   Component Value Date     02/13/2020    K 3.8 02/13/2020    K 3.9 01/25/2020     02/13/2020    CO2 25 02/13/2020    BUN 10 02/13/2020    CREATININE 0.8 02/13/2020    GFRAA >60 02/13/2020    LABGLOM >60 02/13/2020    GLUCOSE 103 02/13/2020    PROT 6.7 02/13/2020    LABALBU 3.4 02/13/2020    CALCIUM 9.4 02/13/2020    BILITOT 0.3 02/13/2020 ALKPHOS 97 02/13/2020    AST 27 02/13/2020    ALT 16 02/13/2020          Microbiology :  No results for input(s): BC in the last 72 hours. No results for input(s): Alexander Saint in the last 72 hours. No results for input(s): LABURIN in the last 72 hours. No results for input(s): CULTRESP in the last 72 hours. No results for input(s): WNDABS in the last 72 hours. Radiology :  CT ABDOMEN PELVIS WO CONTRAST Additional Contrast? None   Final Result   Severe cystitis is  significantly thickened inflamed urinary bladder. There is no obstructing uropathy. 2 mm nonobstructing left kidney   stone is present.                 Assessment:  · Recurrent cystitis going back 5 years from E. coli that is very sensitive to ESBL E. coli then to Klebsiella to enterococcus the last few cultures  · Spastic bladder with overflow incontinence  · Sepsis on admission criteria  ·  urine cultures from 2/6/2020- E.faecais (amp and van sensitive) and E.clocae   Plan:    · Levaquin and Amoxil for discharge-scripts on chart  · Discussed with urology                 Electronically signed by Arti Lozano MD on 2/13/2020 at 12:11 PM

## 2020-03-03 ENCOUNTER — NURSE ONLY (OUTPATIENT)
Dept: NON INVASIVE DIAGNOSTICS | Age: 85
End: 2020-03-03
Payer: MEDICARE

## 2020-03-03 PROCEDURE — 93296 REM INTERROG EVL PM/IDS: CPT | Performed by: INTERNAL MEDICINE

## 2020-03-03 PROCEDURE — 93294 REM INTERROG EVL PM/LDLS PM: CPT | Performed by: INTERNAL MEDICINE

## 2020-03-05 NOTE — PROGRESS NOTES
See [facility] report. Remote monitoring reviewed over a 90 day period.   End of 90 day monitoring period date of service 03/3/2020

## 2020-03-09 ENCOUNTER — HOSPITAL ENCOUNTER (OUTPATIENT)
Age: 85
Discharge: HOME | End: 2020-03-11
Payer: MEDICARE

## 2020-03-09 PROCEDURE — 87088 URINE BACTERIA CULTURE: CPT

## 2020-03-11 LAB
ORGANISM: ABNORMAL
URINE CULTURE, ROUTINE: ABNORMAL

## 2020-03-19 ENCOUNTER — HOSPITAL ENCOUNTER (OUTPATIENT)
Age: 85
Discharge: HOME | End: 2020-03-21
Payer: MEDICARE

## 2020-03-19 PROCEDURE — 87088 URINE BACTERIA CULTURE: CPT

## 2020-03-19 PROCEDURE — 87077 CULTURE AEROBIC IDENTIFY: CPT

## 2020-03-19 PROCEDURE — 87186 SC STD MICRODIL/AGAR DIL: CPT

## 2020-03-21 LAB
ORGANISM: ABNORMAL
URINE CULTURE, ROUTINE: ABNORMAL

## 2020-04-30 ENCOUNTER — HOSPITAL ENCOUNTER (OUTPATIENT)
Age: 85
Discharge: HOME | End: 2020-05-02
Payer: MEDICARE

## 2020-04-30 PROCEDURE — 87186 SC STD MICRODIL/AGAR DIL: CPT

## 2020-04-30 PROCEDURE — 87088 URINE BACTERIA CULTURE: CPT

## 2020-05-02 LAB
ORGANISM: ABNORMAL
URINE CULTURE, ROUTINE: ABNORMAL

## 2020-06-02 ENCOUNTER — NURSE ONLY (OUTPATIENT)
Dept: NON INVASIVE DIAGNOSTICS | Age: 85
End: 2020-06-02
Payer: MEDICARE

## 2020-06-02 PROCEDURE — 93294 REM INTERROG EVL PM/LDLS PM: CPT | Performed by: INTERNAL MEDICINE

## 2020-06-02 PROCEDURE — 93296 REM INTERROG EVL PM/IDS: CPT | Performed by: INTERNAL MEDICINE

## 2020-06-02 NOTE — PROGRESS NOTES
See [facility] report. Remote monitoring reviewed over a 90 day period.   End of 90 day monitoring period date of service 06/02/2020

## 2020-06-16 ENCOUNTER — HOSPITAL ENCOUNTER (OUTPATIENT)
Age: 85
Discharge: HOME | End: 2020-06-18
Payer: MEDICARE

## 2020-06-16 PROCEDURE — 88305 TISSUE EXAM BY PATHOLOGIST: CPT

## 2020-09-01 ENCOUNTER — NURSE ONLY (OUTPATIENT)
Dept: NON INVASIVE DIAGNOSTICS | Age: 85
End: 2020-09-01
Payer: MEDICARE

## 2020-09-01 PROCEDURE — 93294 REM INTERROG EVL PM/LDLS PM: CPT | Performed by: INTERNAL MEDICINE

## 2020-09-01 PROCEDURE — 93296 REM INTERROG EVL PM/IDS: CPT | Performed by: INTERNAL MEDICINE

## 2020-09-21 ENCOUNTER — PREP FOR PROCEDURE (OUTPATIENT)
Dept: UROLOGY | Age: 85
End: 2020-09-21

## 2020-09-21 RX ORDER — SODIUM CHLORIDE 0.9 % (FLUSH) 0.9 %
10 SYRINGE (ML) INJECTION EVERY 12 HOURS SCHEDULED
Status: CANCELLED | OUTPATIENT
Start: 2020-09-21

## 2020-09-21 RX ORDER — SODIUM CHLORIDE 0.9 % (FLUSH) 0.9 %
10 SYRINGE (ML) INJECTION PRN
Status: CANCELLED | OUTPATIENT
Start: 2020-09-21

## 2020-09-22 NOTE — PROGRESS NOTES
Patient history includes bradycardia, patient has [employer] pacemaker, last interrogation 9-2-20 patient follows up with Dr. Cliff Granados (last office visit 2-26-19). Patient reports no cardiac issues/ S/S at this time. Above reviewed with Dr. June Gould, no needs.

## 2020-09-22 NOTE — PROGRESS NOTES
Have you been tested for COVID  No           Have you been told you were positive for COVID No  Have you had any known exposure to someone that is positive for COVID No  Do you have a cough                   No              Do you have shortness of breath No                 Do you have a sore throat            No                Are you having chills                    No                Are you having muscle aches. No                    Please come to the hospital wearing a mask and have your significant other wear a mask as well. Both of you should check your temperature before leaving to come here,  if it is 100 or higher please call [phone] for instruction. Jeannie PRE-ADMISSION TESTING INSTRUCTIONS    The Preadmission Testing patient is instructed accordingly using the following criteria (check applicable):    ARRIVAL INSTRUCTIONS:  [x] Parking the day of Surgery is located in the Main Entrance lot. Upon entering the door, make an immediate right to the surgery reception desk    [] [phone] is available Monday through Friday 6 am to 6 pm    [x] Bring photo ID and insurance card    [] Bring in a copy of Living will or Durable Power of  papers.     [x] Please be sure to arrange for responsible adult to provide transportation to and from the hospital    [x] Please arrange for responsible adult to be with you for the 24 hour period post procedure due to having anesthesia      GENERAL INSTRUCTIONS:    [x] Nothing by mouth after midnight, including gum, candy, mints or water    [x] You may brush your teeth, but do not swallow any water    [] Take medications as instructed with 1-2 oz of water    [x] Stop herbal supplements and vitamins 5 days prior to procedure    [x] Follow preop dosing of blood thinners per physician instructions    [] Take 1/2 dose of evening insulin, but no insulin after midnight    [] No oral diabetic medications after midnight    [] If diabetic and have low blood sugar or feel symptomatic, take 1-2oz apple juice only    [] Bring inhalers day of surgery    [] Bring C-PAP/ Bi-Pap day of surgery    [] Bring urine specimen day of surgery    [x] Shower or bath with soap, lather and rinse well, AM of Surgery, no lotion, powders or creams to surgical site    [] Follow bowel prep as instructed per surgeon    [x] No tobacco products within 24 hours of surgery     [x] No alcohol or illegal drug use within 24 hours of surgery.     [x] Jewelry, body piercing's, eyeglasses, contact lenses and dentures are not permitted into surgery (bring cases)      [x] Please do not wear any nail polish, make up or hair products on the day of surgery    [x] If not already done, you can expect a call from registration    [x] You can expect a call the business day prior to procedure to notify you if your arrival time changes    [x] If you receive a survey after surgery we would greatly appreciate your comments    [] Parent/guardian of a minor must accompany their child and remain on the premises  the entire time they are under our care     [] Pediatric patients may bring favorite toy, blanket or comfort item with them    [] A caregiver or family member must remain with the patient during their stay if they are mentally handicapped, have dementia, disoriented or unable to use a call light or would be a safety concern if left unattended    [x] Please notify surgeon if you develop any illness between now and time of surgery (cold, cough, sore throat, fever, nausea, vomiting) or any signs of infections  including skin, wounds, and dental.    [x]  The Outpatient Pharmacy is available to fill your prescription here on your day of surgery, ask your preop nurse for details    [x] Other instructions  EDUCATIONAL MATERIALS PROVIDED:    [] PAT Preoperative Education Packet/Booklet     [] Medication List    [] Fluoroscopy Information Pamphlet    [] Transfusion bracelet applied with instructions    [] Joint replacement video reviewed    [] Shower with soap, lather and rinse well, and use CHG wipes provided the evening before surgery as instructed

## 2020-09-25 ENCOUNTER — ANESTHESIA EVENT (OUTPATIENT)
Dept: OPERATING ROOM | Age: 85
End: 2020-09-25
Payer: MEDICARE

## 2020-09-25 ENCOUNTER — HOSPITAL ENCOUNTER (OUTPATIENT)
Age: 85
Setting detail: OBSERVATION
Discharge: HOME | End: 2020-09-29
Attending: UROLOGY | Admitting: UROLOGY
Payer: MEDICARE

## 2020-09-25 ENCOUNTER — ANESTHESIA (OUTPATIENT)
Dept: OPERATING ROOM | Age: 85
End: 2020-09-25
Payer: MEDICARE

## 2020-09-25 VITALS
RESPIRATION RATE: 12 BRPM | DIASTOLIC BLOOD PRESSURE: 62 MMHG | OXYGEN SATURATION: 100 % | SYSTOLIC BLOOD PRESSURE: 136 MMHG

## 2020-09-25 PROBLEM — N31.9 NEUROGENIC BLADDER: Status: ACTIVE | Noted: 2020-09-25

## 2020-09-25 PROCEDURE — 6360000002 HC RX W HCPCS: Performed by: REGISTERED NURSE

## 2020-09-25 PROCEDURE — 6360000002 HC RX W HCPCS: Performed by: NURSE PRACTITIONER

## 2020-09-25 PROCEDURE — 7100000000 HC PACU RECOVERY - FIRST 15 MIN: Performed by: UROLOGY

## 2020-09-25 PROCEDURE — 2580000003 HC RX 258: Performed by: UROLOGY

## 2020-09-25 PROCEDURE — 2580000003 HC RX 258: Performed by: REGISTERED NURSE

## 2020-09-25 PROCEDURE — 3700000001 HC ADD 15 MINUTES (ANESTHESIA): Performed by: UROLOGY

## 2020-09-25 PROCEDURE — 6360000002 HC RX W HCPCS: Performed by: UROLOGY

## 2020-09-25 PROCEDURE — 7100000001 HC PACU RECOVERY - ADDTL 15 MIN: Performed by: UROLOGY

## 2020-09-25 PROCEDURE — 6370000000 HC RX 637 (ALT 250 FOR IP): Performed by: INTERNAL MEDICINE

## 2020-09-25 PROCEDURE — 2709999900 HC NON-CHARGEABLE SUPPLY: Performed by: UROLOGY

## 2020-09-25 PROCEDURE — 3600000002 HC SURGERY LEVEL 2 BASE: Performed by: UROLOGY

## 2020-09-25 PROCEDURE — G0378 HOSPITAL OBSERVATION PER HR: HCPCS

## 2020-09-25 PROCEDURE — 6360000002 HC RX W HCPCS: Performed by: ANESTHESIOLOGY

## 2020-09-25 PROCEDURE — 2500000003 HC RX 250 WO HCPCS: Performed by: REGISTERED NURSE

## 2020-09-25 PROCEDURE — 3700000000 HC ANESTHESIA ATTENDED CARE: Performed by: UROLOGY

## 2020-09-25 PROCEDURE — 3600000012 HC SURGERY LEVEL 2 ADDTL 15MIN: Performed by: UROLOGY

## 2020-09-25 PROCEDURE — 2500000003 HC RX 250 WO HCPCS: Performed by: UROLOGY

## 2020-09-25 RX ORDER — SODIUM CHLORIDE 0.9 % (FLUSH) 0.9 %
10 SYRINGE (ML) INJECTION EVERY 12 HOURS SCHEDULED
Status: DISCONTINUED | OUTPATIENT
Start: 2020-09-25 | End: 2020-09-29 | Stop reason: HOSPADM

## 2020-09-25 RX ORDER — TRIAMTERENE AND HYDROCHLOROTHIAZIDE 37.5; 25 MG/1; MG/1
1 TABLET ORAL EVERY MORNING
Status: DISCONTINUED | OUTPATIENT
Start: 2020-09-26 | End: 2020-09-29 | Stop reason: HOSPADM

## 2020-09-25 RX ORDER — PROPOFOL 10 MG/ML
INJECTION, EMULSION INTRAVENOUS CONTINUOUS PRN
Status: DISCONTINUED | OUTPATIENT
Start: 2020-09-25 | End: 2020-09-25 | Stop reason: SDUPTHER

## 2020-09-25 RX ORDER — CALCIUM CARBONATE 200(500)MG
1 TABLET,CHEWABLE ORAL DAILY
Status: DISCONTINUED | OUTPATIENT
Start: 2020-09-25 | End: 2020-09-29 | Stop reason: HOSPADM

## 2020-09-25 RX ORDER — LOSARTAN POTASSIUM 50 MG/1
50 TABLET ORAL DAILY
Status: DISCONTINUED | OUTPATIENT
Start: 2020-09-25 | End: 2020-09-29 | Stop reason: HOSPADM

## 2020-09-25 RX ORDER — SODIUM CHLORIDE 0.9 % (FLUSH) 0.9 %
10 SYRINGE (ML) INJECTION EVERY 12 HOURS SCHEDULED
Status: DISCONTINUED | OUTPATIENT
Start: 2020-09-25 | End: 2020-09-25 | Stop reason: HOSPADM

## 2020-09-25 RX ORDER — FENTANYL CITRATE 50 UG/ML
25 INJECTION, SOLUTION INTRAMUSCULAR; INTRAVENOUS EVERY 5 MIN PRN
Status: COMPLETED | OUTPATIENT
Start: 2020-09-25 | End: 2020-09-25

## 2020-09-25 RX ORDER — SODIUM CHLORIDE 0.9 % (FLUSH) 0.9 %
10 SYRINGE (ML) INJECTION PRN
Status: DISCONTINUED | OUTPATIENT
Start: 2020-09-25 | End: 2020-09-25 | Stop reason: HOSPADM

## 2020-09-25 RX ORDER — SODIUM CHLORIDE 9 MG/ML
INJECTION, SOLUTION INTRAVENOUS CONTINUOUS PRN
Status: DISCONTINUED | OUTPATIENT
Start: 2020-09-25 | End: 2020-09-25 | Stop reason: SDUPTHER

## 2020-09-25 RX ORDER — ACETAMINOPHEN 325 MG/1
650 TABLET ORAL EVERY 4 HOURS PRN
Status: DISCONTINUED | OUTPATIENT
Start: 2020-09-25 | End: 2020-09-27

## 2020-09-25 RX ORDER — PROPOFOL 10 MG/ML
INJECTION, EMULSION INTRAVENOUS PRN
Status: DISCONTINUED | OUTPATIENT
Start: 2020-09-25 | End: 2020-09-25 | Stop reason: SDUPTHER

## 2020-09-25 RX ORDER — LIDOCAINE HYDROCHLORIDE 20 MG/ML
INJECTION, SOLUTION EPIDURAL; INFILTRATION; INTRACAUDAL; PERINEURAL PRN
Status: DISCONTINUED | OUTPATIENT
Start: 2020-09-25 | End: 2020-09-25 | Stop reason: SDUPTHER

## 2020-09-25 RX ORDER — SODIUM CHLORIDE 0.9 % (FLUSH) 0.9 %
10 SYRINGE (ML) INJECTION PRN
Status: DISCONTINUED | OUTPATIENT
Start: 2020-09-25 | End: 2020-09-29 | Stop reason: HOSPADM

## 2020-09-25 RX ORDER — LIDOCAINE HYDROCHLORIDE 10 MG/ML
INJECTION, SOLUTION INFILTRATION; PERINEURAL PRN
Status: DISCONTINUED | OUTPATIENT
Start: 2020-09-25 | End: 2020-09-25 | Stop reason: ALTCHOICE

## 2020-09-25 RX ORDER — DEXTROSE, SODIUM CHLORIDE, AND POTASSIUM CHLORIDE 5; .45; .15 G/100ML; G/100ML; G/100ML
INJECTION INTRAVENOUS CONTINUOUS
Status: DISCONTINUED | OUTPATIENT
Start: 2020-09-25 | End: 2020-09-27

## 2020-09-25 RX ADMIN — SODIUM CHLORIDE: 9 INJECTION, SOLUTION INTRAVENOUS at 13:22

## 2020-09-25 RX ADMIN — PROPOFOL 50 MG: 10 INJECTION, EMULSION INTRAVENOUS at 13:50

## 2020-09-25 RX ADMIN — HYDROMORPHONE HYDROCHLORIDE 0.25 MG: 1 INJECTION, SOLUTION INTRAMUSCULAR; INTRAVENOUS; SUBCUTANEOUS at 21:46

## 2020-09-25 RX ADMIN — HYDROMORPHONE HYDROCHLORIDE 0.25 MG: 1 INJECTION, SOLUTION INTRAMUSCULAR; INTRAVENOUS; SUBCUTANEOUS at 17:10

## 2020-09-25 RX ADMIN — CALCIUM CARBONATE 500 MG: 500 TABLET, CHEWABLE ORAL at 21:47

## 2020-09-25 RX ADMIN — FENTANYL CITRATE 25 MCG: 50 INJECTION, SOLUTION INTRAMUSCULAR; INTRAVENOUS at 14:39

## 2020-09-25 RX ADMIN — Medication 2 G: at 21:47

## 2020-09-25 RX ADMIN — Medication 10 ML: at 21:45

## 2020-09-25 RX ADMIN — LOSARTAN POTASSIUM 50 MG: 50 TABLET, FILM COATED ORAL at 16:59

## 2020-09-25 RX ADMIN — PROPOFOL 50 MCG/KG/MIN: 10 INJECTION, EMULSION INTRAVENOUS at 13:26

## 2020-09-25 RX ADMIN — PROPOFOL 50 MG: 10 INJECTION, EMULSION INTRAVENOUS at 13:36

## 2020-09-25 RX ADMIN — PROPOFOL 50 MG: 10 INJECTION, EMULSION INTRAVENOUS at 13:46

## 2020-09-25 RX ADMIN — LIDOCAINE HYDROCHLORIDE 50 MG: 20 INJECTION, SOLUTION EPIDURAL; INFILTRATION; INTRACAUDAL; PERINEURAL at 13:43

## 2020-09-25 RX ADMIN — Medication 2 G: at 13:26

## 2020-09-25 RX ADMIN — POTASSIUM CHLORIDE, DEXTROSE MONOHYDRATE AND SODIUM CHLORIDE: 150; 5; 450 INJECTION, SOLUTION INTRAVENOUS at 16:59

## 2020-09-25 RX ADMIN — FENTANYL CITRATE 25 MCG: 50 INJECTION, SOLUTION INTRAMUSCULAR; INTRAVENOUS at 14:15

## 2020-09-25 RX ADMIN — FENTANYL CITRATE 25 MCG: 50 INJECTION, SOLUTION INTRAMUSCULAR; INTRAVENOUS at 14:30

## 2020-09-25 RX ADMIN — FENTANYL CITRATE 25 MCG: 50 INJECTION, SOLUTION INTRAMUSCULAR; INTRAVENOUS at 14:22

## 2020-09-25 ASSESSMENT — PAIN DESCRIPTION - DESCRIPTORS: DESCRIPTORS: ACHING;DISCOMFORT;DULL

## 2020-09-25 ASSESSMENT — PAIN DESCRIPTION - PAIN TYPE: TYPE: ACUTE PAIN;SURGICAL PAIN

## 2020-09-25 ASSESSMENT — PULMONARY FUNCTION TESTS
PIF_VALUE: 1
PIF_VALUE: 0
PIF_VALUE: 0
PIF_VALUE: 1

## 2020-09-25 ASSESSMENT — PAIN SCALES - GENERAL
PAINLEVEL_OUTOF10: 7
PAINLEVEL_OUTOF10: 6
PAINLEVEL_OUTOF10: 5
PAINLEVEL_OUTOF10: 4
PAINLEVEL_OUTOF10: 5
PAINLEVEL_OUTOF10: 6
PAINLEVEL_OUTOF10: 4

## 2020-09-25 ASSESSMENT — PAIN DESCRIPTION - LOCATION: LOCATION: HIP

## 2020-09-25 ASSESSMENT — PAIN DESCRIPTION - PROGRESSION: CLINICAL_PROGRESSION: GRADUALLY WORSENING

## 2020-09-25 ASSESSMENT — PAIN DESCRIPTION - FREQUENCY: FREQUENCY: CONTINUOUS

## 2020-09-25 ASSESSMENT — PAIN DESCRIPTION - ONSET: ONSET: ON-GOING

## 2020-09-25 ASSESSMENT — PAIN - FUNCTIONAL ASSESSMENT: PAIN_FUNCTIONAL_ASSESSMENT: PREVENTS OR INTERFERES SOME ACTIVE ACTIVITIES AND ADLS

## 2020-09-25 ASSESSMENT — PAIN DESCRIPTION - ORIENTATION: ORIENTATION: RIGHT;LEFT

## 2020-09-25 NOTE — PROGRESS NOTES
Nursing Transfer Note    Data:  Summary of patients progress: post op with Dr. Skyler Foote   Reason for transfer: admission    Action:  Explained reason for transfer to Patient. Report given to: Christopher, using RN Handoff Navigator.   Mode of transportation: cart     Response:  RN Recommendations:

## 2020-09-25 NOTE — OP NOTE
general area with   approximately 10 mL of lidocaine. With the aid of a 15-blade, I made a small   1-cm incision. I was able to take the tip, dissect down to it and then bring   the tip up. This was done in atraumatic fashion. Through the curved Lowsley, there   was a drilled eyelet. Through that, I placed a silk stitch and then I took   that same silk stitch and tied it to a 30-Croatian Ng catheter. I then   removed the curved Lows[place]. This brought the Ng catheter into the bladder   and then it was externalized through the urethral meatus. I then cut the   silk stitch, removed the curved Lowsley. With the aid of a cystoscope, I   looked the distal tip of the Ng back into the bladder. It was at this   point, once it was appropriately positioned, I then filled the balloon with   approximately 10 mL of sterile water. I did remove the cystoscope. I then stitched the suprapubic tube to the skin with an 0 silk on a cutting   needle. A couple of 4-0 chromics were also applied just to give it more of a   cosmetic appearance. The Ng catheter was unclamped and got clear urine. She tolerated the procedure quite well. There were no intraoperative   complications. Please note: Will plan to change the suprapubic tube in about   4 weeks.        Electronically crearted by: David Duncan D.O.

## 2020-09-25 NOTE — PROGRESS NOTES
Left message for Dr. Arias Books to get admitting orders. Awaiting return call.  Electronically signed by Martín Chambers RN on 9/25/2020 at 3:59 PM

## 2020-09-25 NOTE — ANESTHESIA PRE PROCEDURE
Department of Anesthesiology  Preprocedure Note       Name:  Sandrine Hughes   Age:  80 y.o.  :  1935                                          MRN:  [id number]         Date:  2020      Surgeon: Chucky Lamar):  Latoya Weiss,     Procedure: Procedure(s):  CYSTOSCOPY SUPRAPUBIC TUBE INSERTION    Medications prior to admission:   Prior to Admission medications    Medication Sig Start Date End Date Taking?  Authorizing Provider   carboxymethylcellulose 1 % ophthalmic solution Place 1 drop into both eyes daily as needed for Dry Eyes   Yes Historical Provider, MD   tetrahydrozoline 0.05 % ophthalmic solution Place 1 drop into both eyes daily as needed   Yes Historical Provider, MD   calcium carbonate (TUMS) 500 MG chewable tablet Take 1 tablet by mouth daily   Yes Historical Provider, MD   aspirin 81 MG EC tablet Take 1 tablet by mouth 2 times daily 10/17/19  Yes NICKY Stark   acetaminophen (TYLENOL) 500 MG tablet Take 250 mg by mouth every 4 hours as needed for Pain    Yes Historical Provider, MD   irbesartan (AVAPRO) 150 MG tablet Take 150 mg by mouth every evening  19  Yes Historical Provider, MD   triamterene-hydrochlorothiazide (MAXZIDE-25) 37.5-25 MG per tablet Take 1 tablet by mouth every morning  18  Yes Historical Provider, MD   sodium chloride (OCEAN, BABY AYR) 0.65 % nasal spray 1 spray by Nasal route as needed for Congestion   Yes Historical Provider, MD   Cholecalciferol (VITAMIN D) 50 MCG (2000 UT) TABS tablet Take 2,000 Units by mouth daily    Yes Historical Provider, MD       Current medications:    Current Facility-Administered Medications   Medication Dose Route Frequency Provider Last Rate Last Dose    ceFAZolin (ANCEF) 2 g in sterile water 20 mL IV syringe  2 g Intravenous On Call to 4050 AYAN Torres - CNP        sodium chloride flush 0.9 % injection 10 mL  10 mL Intravenous 2 times per day AYAN Velasco - CNP        sodium chloride flush 0.9 % injection 10 mL 10 mL Intravenous PRN Brenda Gunderson, APRN - CNP           Allergies:     Allergies   Allergen Reactions    Codeine Other (See Comments)     headache    Amlodipine Nausea And Vomiting    Augmentin [Amoxicillin-Pot Clavulanate] Nausea And Vomiting    Bactrim Nausea And Vomiting    Benadryl [Diphenhydramine] Nausea And Vomiting    Brovana [Arformoterol] Other (See Comments)     Exacerbates problems      Cardizem [Diltiazem Hcl] Other (See Comments)     Turned her into zombie    Doxazosin     Ipratropium Other (See Comments)     Exacerbates problems      Macrodantin [Nitrofurantoin Macrocrystal] Nausea And Vomiting    Verapamil Other (See Comments)     Turns her into \"zombie\"         Problem List:    Patient Active Problem List   Diagnosis Code    Pacemaker Z95.0    Orthostatic hypotension I95.1    COPD (chronic obstructive pulmonary disease) ([facility]) J44.9    Essential hypertension I10    Atrial fibrillation ([facility]) I48.91    Sepsis ([facility] [address] A41.9    Primary osteoarthritis of right hip M16.11    Cystitis N30.90    Respiratory distress R06.03       Past Medical History:        Diagnosis Date    Arthritis     AVB (atrioventricular block) 6/20/2012    CAD (coronary artery disease)     Cancer (HCC)     colon treated with surgery 1990's    Chronic kidney disease, stage 3 ([address]     begining of stage 3    Complicated UTI (urinary tract infection) 10/8/2017    Female bladder prolapse     for OR 9-25-20     GERD (gastroesophageal reflux disease)     [place] (hard of hearing)     Hypertension     uncontrolled BP    Hypokalemia 11/27/2018    Psoriasis     Symptomatic bradycardia        Past Surgical History:        Procedure Laterality Date    APPENDECTOMY      BACK SURGERY      CARDIAC CATHETERIZATION  02/10/2016    Dr. Merlinda Acosta single vessel disease, no stents placed     CHOLECYSTECTOMY      COLECTOMY      1990's     COLONOSCOPY      ECHO COMPLETE  6/20/2012         ENDOSCOPY, COLON, DIAGNOSTIC 02/13/2020    GLUCOSE 103 02/13/2020    PROT 6.7 02/13/2020    CALCIUM 9.4 02/13/2020    BILITOT 0.3 02/13/2020    ALKPHOS 97 02/13/2020    AST 27 02/13/2020    ALT 16 02/13/2020       POC Tests: No results for input(s): POCGLU, POCNA, POCK, POCCL, POCBUN, POCHEMO, POCHCT in the last 72 hours. Coags:   Lab Results   Component Value Date    PROTIME 11.2 01/25/2020    PROTIME 17.3 05/13/2012    INR 1.0 01/25/2020    APTT 27.4 02/06/2020       HCG (If Applicable): No results found for: PREGTESTUR, PREGSERUM, HCG, HCGQUANT     ABGs: No results found for: PHART, PO2ART, OMT3KCV, XJT6KDL, BEART, G1EBKDNC     Type & Screen (If Applicable):  No results found for: LABABO, LABRH    Drug/Infectious Status (If Applicable):  No results found for: HIV, HEPCAB    COVID-19 Screening (If Applicable): No results found for: COVID19      Anesthesia Evaluation  Patient summary reviewed no history of anesthetic complications:   Airway: Mallampati: III  TM distance: >3 FB   Neck ROM: full  Mouth opening: > = 3 FB Dental:      Comment: Overbite. Several molars are missing. Poor dentition. Pulmonary: breath sounds clear to auscultation  (+) COPD:                             Cardiovascular:    (+) hypertension:, pacemaker: pacemaker,         Rhythm: regular  Rate: normal           Beta Blocker:  Not on Beta Blocker         Neuro/Psych:   Negative Neuro/Psych ROS              GI/Hepatic/Renal:   (+) GERD: well controlled,           Endo/Other:                      ROS comment: S/P colon resection. --hx of polyps. Abdominal:           Vascular: negative vascular ROS. Anesthesia Plan      MAC     ASA 4     (Pt agrees to [facility] ATHENS and IV sedation.)  Induction: intravenous. Anesthetic plan and risks discussed with patient. Plan discussed with CRNA.     Attending anesthesiologist reviewed and agrees with Pre Eval content            David Delacruz MD   9/25/2020

## 2020-09-26 PROBLEM — I95.9 HYPOTENSION: Status: ACTIVE | Noted: 2020-09-26

## 2020-09-26 LAB
HCT VFR BLD CALC: 38.9 % (ref 34–48)
HEMOGLOBIN: 12.2 G/DL (ref 11.5–15.5)
MCH RBC QN AUTO: 25.9 PG (ref 26–35)
MCHC RBC AUTO-ENTMCNC: 31.4 % (ref 32–34.5)
MCV RBC AUTO: 82.6 FL (ref 80–99.9)
PDW BLD-RTO: 14.7 FL (ref 11.5–15)
PLATELET # BLD: 240 E9/L (ref 130–450)
PMV BLD AUTO: 10.3 FL (ref 7–12)
RBC # BLD: 4.71 E12/L (ref 3.5–5.5)
WBC # BLD: 7.4 E9/L (ref 4.5–11.5)

## 2020-09-26 PROCEDURE — 96372 THER/PROPH/DIAG INJ SC/IM: CPT

## 2020-09-26 PROCEDURE — G0378 HOSPITAL OBSERVATION PER HR: HCPCS

## 2020-09-26 PROCEDURE — 6360000002 HC RX W HCPCS: Performed by: UROLOGY

## 2020-09-26 PROCEDURE — 85027 COMPLETE CBC AUTOMATED: CPT

## 2020-09-26 PROCEDURE — 96376 TX/PRO/DX INJ SAME DRUG ADON: CPT

## 2020-09-26 PROCEDURE — 2580000003 HC RX 258: Performed by: UROLOGY

## 2020-09-26 PROCEDURE — 6370000000 HC RX 637 (ALT 250 FOR IP): Performed by: UROLOGY

## 2020-09-26 PROCEDURE — 2500000003 HC RX 250 WO HCPCS: Performed by: UROLOGY

## 2020-09-26 PROCEDURE — 36415 COLL VENOUS BLD VENIPUNCTURE: CPT

## 2020-09-26 PROCEDURE — 6370000000 HC RX 637 (ALT 250 FOR IP): Performed by: INTERNAL MEDICINE

## 2020-09-26 PROCEDURE — 96374 THER/PROPH/DIAG INJ IV PUSH: CPT

## 2020-09-26 RX ORDER — KETOROLAC TROMETHAMINE 10 MG/1
10 TABLET, FILM COATED ORAL EVERY 6 HOURS PRN
Status: DISCONTINUED | OUTPATIENT
Start: 2020-09-26 | End: 2020-09-27

## 2020-09-26 RX ORDER — KETOROLAC TROMETHAMINE 10 MG/1
10 TABLET, FILM COATED ORAL EVERY 6 HOURS PRN
Qty: 20 TABLET | Refills: 0 | Status: SHIPPED | OUTPATIENT
Start: 2020-09-26 | End: 2020-11-08 | Stop reason: ALTCHOICE

## 2020-09-26 RX ORDER — IBUPROFEN 400 MG/1
400 TABLET ORAL EVERY 8 HOURS PRN
Status: DISCONTINUED | OUTPATIENT
Start: 2020-09-26 | End: 2020-09-27

## 2020-09-26 RX ORDER — HEPARIN SODIUM 10000 [USP'U]/ML
5000 INJECTION, SOLUTION INTRAVENOUS; SUBCUTANEOUS 2 TIMES DAILY
Status: DISCONTINUED | OUTPATIENT
Start: 2020-09-26 | End: 2020-09-29 | Stop reason: HOSPADM

## 2020-09-26 RX ORDER — HYDROXYZINE HYDROCHLORIDE 10 MG/1
10 TABLET, FILM COATED ORAL 3 TIMES DAILY PRN
Status: DISCONTINUED | OUTPATIENT
Start: 2020-09-26 | End: 2020-09-29 | Stop reason: HOSPADM

## 2020-09-26 RX ORDER — IBUPROFEN 600 MG/1
600 TABLET ORAL EVERY 8 HOURS PRN
Status: DISCONTINUED | OUTPATIENT
Start: 2020-09-26 | End: 2020-09-26

## 2020-09-26 RX ORDER — ONDANSETRON 2 MG/ML
4 INJECTION INTRAMUSCULAR; INTRAVENOUS EVERY 6 HOURS PRN
Status: DISCONTINUED | OUTPATIENT
Start: 2020-09-26 | End: 2020-09-29 | Stop reason: HOSPADM

## 2020-09-26 RX ORDER — CIPROFLOXACIN 250 MG/1
250 TABLET, FILM COATED ORAL EVERY 12 HOURS SCHEDULED
Status: COMPLETED | OUTPATIENT
Start: 2020-09-26 | End: 2020-09-29

## 2020-09-26 RX ADMIN — LOSARTAN POTASSIUM 50 MG: 50 TABLET, FILM COATED ORAL at 08:02

## 2020-09-26 RX ADMIN — POTASSIUM CHLORIDE, DEXTROSE MONOHYDRATE AND SODIUM CHLORIDE: 150; 5; 450 INJECTION, SOLUTION INTRAVENOUS at 02:15

## 2020-09-26 RX ADMIN — HEPARIN SODIUM 5000 UNITS: 10000 INJECTION, SOLUTION INTRAVENOUS; SUBCUTANEOUS at 20:41

## 2020-09-26 RX ADMIN — Medication 10 ML: at 07:37

## 2020-09-26 RX ADMIN — Medication 2 G: at 07:37

## 2020-09-26 RX ADMIN — KETOROLAC TROMETHAMINE 10 MG: 10 TABLET, FILM COATED ORAL at 18:22

## 2020-09-26 RX ADMIN — CALCIUM CARBONATE 500 MG: 500 TABLET, CHEWABLE ORAL at 07:36

## 2020-09-26 RX ADMIN — HYDROMORPHONE HYDROCHLORIDE 0.25 MG: 1 INJECTION, SOLUTION INTRAMUSCULAR; INTRAVENOUS; SUBCUTANEOUS at 02:15

## 2020-09-26 RX ADMIN — HYDROMORPHONE HYDROCHLORIDE 0.5 MG: 1 INJECTION, SOLUTION INTRAMUSCULAR; INTRAVENOUS; SUBCUTANEOUS at 07:37

## 2020-09-26 RX ADMIN — HEPARIN SODIUM 5000 UNITS: 10000 INJECTION, SOLUTION INTRAVENOUS; SUBCUTANEOUS at 12:53

## 2020-09-26 RX ADMIN — TRIAMTERENE AND HYDROCHLOROTHIAZIDE 1 TABLET: 37.5; 25 TABLET ORAL at 08:01

## 2020-09-26 RX ADMIN — CIPROFLOXACIN 250 MG: 250 TABLET, FILM COATED ORAL at 20:41

## 2020-09-26 ASSESSMENT — PAIN DESCRIPTION - ONSET
ONSET: ON-GOING
ONSET: PROGRESSIVE
ONSET: ON-GOING

## 2020-09-26 ASSESSMENT — PAIN DESCRIPTION - LOCATION
LOCATION: BACK
LOCATION: ABDOMEN
LOCATION: BACK

## 2020-09-26 ASSESSMENT — PAIN DESCRIPTION - PAIN TYPE
TYPE: ACUTE PAIN

## 2020-09-26 ASSESSMENT — PAIN SCALES - GENERAL
PAINLEVEL_OUTOF10: 10
PAINLEVEL_OUTOF10: 8
PAINLEVEL_OUTOF10: 10
PAINLEVEL_OUTOF10: 5
PAINLEVEL_OUTOF10: 6
PAINLEVEL_OUTOF10: 0

## 2020-09-26 ASSESSMENT — PAIN DESCRIPTION - DESCRIPTORS
DESCRIPTORS: BURNING;SORE;TENDER;THROBBING
DESCRIPTORS: ACHING;SHARP;SHOOTING;SPASM
DESCRIPTORS: ACHING;SHARP;SHOOTING;SPASM

## 2020-09-26 ASSESSMENT — PAIN DESCRIPTION - FREQUENCY
FREQUENCY: CONTINUOUS

## 2020-09-26 ASSESSMENT — PAIN DESCRIPTION - PROGRESSION: CLINICAL_PROGRESSION: NOT CHANGED

## 2020-09-26 NOTE — PROGRESS NOTES
Dr Peter Womack,   Please be aware of the increased bleeding risk with using 2 NSAIDS (Toradol and Ibuprofen). Monitor the patient closely for signs/symptoms of bleeding (especially GI). Thank Lesly Manley Pharm. D 9/26/2020 12:43 PM

## 2020-09-26 NOTE — CONSULTS
at [facility] OR       Medications Prior to Admission:    Medications Prior to Admission: carboxymethylcellulose 1 % ophthalmic solution, Place 1 drop into both eyes daily as needed for Dry Eyes  tetrahydrozoline 0.05 % ophthalmic solution, Place 1 drop into both eyes daily as needed  calcium carbonate (TUMS) 500 MG chewable tablet, Take 1 tablet by mouth daily  aspirin 81 MG EC tablet, Take 1 tablet by mouth 2 times daily  acetaminophen (TYLENOL) 500 MG tablet, Take 250 mg by mouth every 4 hours as needed for Pain   irbesartan (AVAPRO) 150 MG tablet, Take 150 mg by mouth every evening   triamterene-hydrochlorothiazide (MAXZIDE-25) 37.5-25 MG per tablet, Take 1 tablet by mouth every morning   sodium chloride (OCEAN, BABY AYR) 0.65 % nasal spray, 1 spray by Nasal route as needed for Congestion  Cholecalciferol (VITAMIN D) 50 MCG (2000 UT) TABS tablet, Take 2,000 Units by mouth daily     Note that the patient's home medications were reviewed and the above list is accurate to the best of my knowledge at the time of the exam.      Allergies:    Reviewed  Social History:    reports that she quit smoking about 38 years ago. Her smoking use included cigarettes. She started smoking about 55 years ago. She has a 17.00 pack-year smoking history. She has never used smokeless tobacco. She reports that she does not drink alcohol or use drugs. Family History:   family history includes Asthma in her father; Heart Attack in her mother; Heart Disease in her mother; Other in her father. REVIEW OF SYSTEMS:  As above in the HPI, otherwise negative    PHYSICAL EXAM:    Vitals:  BP (!) 84/48   Pulse 71   Temp 98 °F (36.7 °C) (Oral)   Resp 16   Ht 5' 1\" (1.549 m)   Wt 152 lb (68.9 kg)   SpO2 97%   BMI 28.72 kg/m²     General:  Awake, alert, oriented X 3. Well developed, well nourished, well groomed. No apparent distress. HEENT:  Normocephalic, atraumatic. Pupils equal, round, reactive to light. No scleral icterus.   No resolved hospital problems. *      PLAN:    70-year-old female history of neurogenic bladder, COPD, hypertension admitted status post cystoscopy with suprapubic tube placement    Hold BP meds  Check labs  IV fluids  Empiric antibiotics  Phenol spray for post intubation pharyngitis  Atarax for pruritus  Pain control  Bowel regimen  PT/OT  Medications for other co morbidities cont as appropriate w dosage adjustments as necessary     Thank you for allowing me to participate in the care of this patient.      Naina Zeng MD  2:35 PM  9/26/2020

## 2020-09-26 NOTE — ANESTHESIA POSTPROCEDURE EVALUATION
Department of Anesthesiology  Postprocedure Note    Patient: Jay Herrera  MRN: [id number]  YOB: 1935  Date of evaluation: 9/25/2020  Time:  10:05 PM     Procedure Summary     Date:  09/25/20 Room / Location:  [facility] 05 / [facility]    Anesthesia Start:  0440 Anesthesia Stop:  4205    Procedure:  CYSTOSCOPY SUPRAPUBIC TUBE INSERTION (N/A Abdomen) Diagnosis:  (NEUROPATHIC BLADDER)    Surgeon:  Stephenie Weiss DO Responsible Provider:  Graeme Ochoa MD    Anesthesia Type:  MAC ASA Status:  4          Anesthesia Type: MAC    Shelbi Phase I: Shelbi Score: 10    Shelbi Phase II:      Last vitals: Reviewed and per EMR flowsheets.        Anesthesia Post Evaluation    Patient location during evaluation: PACU  Level of consciousness: awake  Airway patency: patent  Nausea & Vomiting: no nausea and no vomiting  Complications: no  Cardiovascular status: hemodynamically stable  Respiratory status: acceptable

## 2020-09-26 NOTE — PROGRESS NOTES
[facility]  PROGRESS NOTE    Chief Complaint:   Neurogenic bladder/Recurrent UTIs    HPI: She feels well. She is little nauseated. Her appetite is poor at this time. Her pain is currently controlled. She lives alone and is awaiting arrangements for home care to be made to assist her with her suprapubic tube upon discharge    Vitals:    09/26/20 0715   BP: (!) 130/54   Pulse: 75   Resp: 16   Temp: 97.9 °F (36.6 °C)   SpO2: 97%       Allergies: Codeine; Amlodipine; Augmentin [amoxicillin-pot clavulanate]; Bactrim; Benadryl [diphenhydramine]; Brovana [arformoterol]; Cardizem [diltiazem hcl];  Doxazosin; Ipratropium; Macrodantin [nitrofurantoin macrocrystal]; and Verapamil    PAST MEDICAL HISTORY:   Past Medical History:   Diagnosis Date    Arthritis     AVB (atrioventricular block) 6/20/2012    CAD (coronary artery disease)     Cancer ([facility] [address]     colon treated with surgery 1990's    Chronic kidney disease, stage 3 ([facility] [address]     begining of stage 3    Complicated UTI (urinary tract infection) 10/8/2017    Female bladder prolapse     for OR 9-25-20     GERD (gastroesophageal reflux disease)     [place] (hard of hearing)     Hypertension     uncontrolled BP    Hypokalemia 11/27/2018    Psoriasis     Symptomatic bradycardia        PAST SURGICAL HISTORY:   Past Surgical History:   Procedure Laterality Date    APPENDECTOMY      BACK SURGERY      CARDIAC CATHETERIZATION  02/10/2016    Dr. Damon Cleaves single vessel disease, no stents placed     CHOLECYSTECTOMY      COLECTOMY      1990's     COLONOSCOPY      CYSTOSCOPY N/A 9/25/2020    CYSTOSCOPY SUPRAPUBIC TUBE INSERTION performed by Bina NUNN Memo, DO at [facility] ECHO COMPLETE  6/20/2012         ENDOSCOPY, COLON, DIAGNOSTIC      HYSTERECTOMY      JOINT REPLACEMENT      left hip 2012    KYPHOSIS SURGERY      PACEMAKER PLACEMENT  6-    [facility] dual chamber - Dr. Miller Foil Right 10/15/2019    RIGHT HIP TOTAL ARTHROPLASTY performed by Colette Atwood MD at [facility] OR        PAST FAMILY HISTORY:    Family History   Problem Relation Age of Onset    Heart Disease Mother     Heart Attack Mother     Other Father     Asthma Father        PAST SOCIAL HISTORY:    Social History     Socioeconomic History    Marital status:       Spouse name: None    Number of children: 2    Years of education: None    Highest education level: None   Occupational History    None   Social Needs    Financial resource strain: None    Food insecurity     Worry: None     Inability: None    Transportation needs     Medical: None     Non-medical: None   Tobacco Use    Smoking status: Former Smoker     Packs/day: 1.00     Years: 17.00     Pack years: 17.00     Types: Cigarettes     Start date: 1965     Last attempt to quit: 1982     Years since quittin.2    Smokeless tobacco: Never Used   Substance and Sexual Activity    Alcohol use: No    Drug use: No    Sexual activity: Never   Lifestyle    Physical activity     Days per week: None     Minutes per session: None    Stress: None   Relationships    Social connections     Talks on phone: None     Gets together: None     Attends Cheondoism service: None     Active member of club or organization: None     Attends meetings of clubs or organizations: None     Relationship status: None    Intimate partner violence     Fear of current or ex partner: None     Emotionally abused: None     Physically abused: None     Forced sexual activity: None   Other Topics Concern    None   Social History Narrative    None       IV:    dextrose 5% and 0.45% NaCl with KCl 20 mEq 100 mL/hr at 20 0215       PRN: ondansetron, ketorolac, ibuprofen, sodium chloride flush, naphazoline-pheniramine, carboxymethylcellulose, acetaminophen    Scheduled:    heparin (porcine)  5,000 Units Subcutaneous BID    ciprofloxacin  250 mg Oral 2 times per day    sodium chloride flush  10 mL Intravenous 2 times per day    losartan  50 mg Oral Daily    triamterene-hydroCHLOROthiazide  1 tablet Oral QAM    calcium carbonate  1 tablet Oral Daily       Lab Results   Component Value Date     02/13/2020    K 3.8 02/13/2020    K 3.9 01/25/2020    BUN 10 02/13/2020    CREATININE 0.8 02/13/2020        Lab Results   Component Value Date    HGB 12.2 09/26/2020    HCT 38.9 09/26/2020       Review Of Systems:  Constitutional: Tired  Eyes: negative  Ears, nose, mouth, throat, and face: negative  Respiratory: negative  Cardiovascular: Hypertension  Gastrointestinal: GERD  Genitourinary: Neurogenic bladder  Musculoskeletal: Arthritis  Neurological: negative  Behavioral/Psych: negative  Endocrine: negative    Physical Exam:  Skin dry, without rashes  Respirations non-labored, intact  Abdomen soft, non-tender, non-distended. Active bowel sounds. Alert and oriented x3  Suprapubic tube is draining clear, yellow urine. Mild bloody drainage around the suprapubic tube site    Assessment and Plan:  POD#1--S/P Cysto/SP Tube insertion  -Continue empiric antibiotics  -Change SP tube dressing daily and as needed  -Patient has a codeine allergy. Dilaudid will be discontinued. Tylenol, ibuprofen, oral Toradol as needed for pain control  -DVT prophylaxis  -Home care has been consulted for discharge planning.   Patient is stable for discharge, however, her health insurance requires prior authorization for home care arrangements to be made and, therefore, she will be kept in the hospital until likelyTuesday which unfortunately is not necessary from a medical standpoint  -SP tube will be changed in the office in roughly 4 weeks      Mazin Osorio MD  9/26/2020  12:35 PM

## 2020-09-26 NOTE — PLAN OF CARE
Problem: Pain:  Goal: Pain level will decrease  Description: Pain level will decrease  9/26/2020 0950 by Jolene Sharp RN  Outcome: Met This Shift     Problem: Falls - Risk of:  Goal: Will remain free from falls  Description: Will remain free from falls  9/26/2020 0950 by Jolene Sharp RN  Outcome: Met This Shift

## 2020-09-26 NOTE — PROGRESS NOTES
Educated patient on the importance of Incentive Spirometer, Patient returned demonstration of 750 and tolerated well.  Patient will need continuous hourly reminding to use AISHA SLATER [facility]

## 2020-09-27 LAB
ANION GAP SERPL CALCULATED.3IONS-SCNC: 5 MMOL/L (ref 7–16)
BUN BLDV-MCNC: 15 MG/DL (ref 8–23)
CALCIUM SERPL-MCNC: 8.8 MG/DL (ref 8.6–10.2)
CHLORIDE BLD-SCNC: 103 MMOL/L (ref 98–107)
CO2: 22 MMOL/L (ref 22–29)
CREAT SERPL-MCNC: 0.9 MG/DL (ref 0.5–1)
GFR AFRICAN AMERICAN: >60
GFR NON-AFRICAN AMERICAN: 59 ML/MIN/1.73
GLUCOSE BLD-MCNC: 121 MG/DL (ref 74–99)
HCT VFR BLD CALC: 37.1 % (ref 34–48)
HEMOGLOBIN: 11.8 G/DL (ref 11.5–15.5)
MCH RBC QN AUTO: 26 PG (ref 26–35)
MCHC RBC AUTO-ENTMCNC: 31.8 % (ref 32–34.5)
MCV RBC AUTO: 81.9 FL (ref 80–99.9)
PDW BLD-RTO: 14.9 FL (ref 11.5–15)
PLATELET # BLD: 228 E9/L (ref 130–450)
PMV BLD AUTO: 10.3 FL (ref 7–12)
POTASSIUM SERPL-SCNC: 4 MMOL/L (ref 3.5–5)
RBC # BLD: 4.53 E12/L (ref 3.5–5.5)
SODIUM BLD-SCNC: 130 MMOL/L (ref 132–146)
WBC # BLD: 6.3 E9/L (ref 4.5–11.5)

## 2020-09-27 PROCEDURE — 2580000003 HC RX 258: Performed by: INTERNAL MEDICINE

## 2020-09-27 PROCEDURE — 85027 COMPLETE CBC AUTOMATED: CPT

## 2020-09-27 PROCEDURE — 6370000000 HC RX 637 (ALT 250 FOR IP): Performed by: UROLOGY

## 2020-09-27 PROCEDURE — 6360000002 HC RX W HCPCS: Performed by: UROLOGY

## 2020-09-27 PROCEDURE — 6370000000 HC RX 637 (ALT 250 FOR IP): Performed by: INTERNAL MEDICINE

## 2020-09-27 PROCEDURE — 96372 THER/PROPH/DIAG INJ SC/IM: CPT

## 2020-09-27 PROCEDURE — 36415 COLL VENOUS BLD VENIPUNCTURE: CPT

## 2020-09-27 PROCEDURE — 2580000003 HC RX 258: Performed by: UROLOGY

## 2020-09-27 PROCEDURE — G0378 HOSPITAL OBSERVATION PER HR: HCPCS

## 2020-09-27 PROCEDURE — 2500000003 HC RX 250 WO HCPCS: Performed by: UROLOGY

## 2020-09-27 PROCEDURE — 80048 BASIC METABOLIC PNL TOTAL CA: CPT

## 2020-09-27 RX ORDER — KETOROLAC TROMETHAMINE 10 MG/1
10 TABLET, FILM COATED ORAL EVERY 6 HOURS PRN
Status: DISCONTINUED | OUTPATIENT
Start: 2020-09-27 | End: 2020-09-29 | Stop reason: HOSPADM

## 2020-09-27 RX ORDER — ACETAMINOPHEN 325 MG/1
650 TABLET ORAL EVERY 4 HOURS PRN
Status: DISCONTINUED | OUTPATIENT
Start: 2020-09-27 | End: 2020-09-29 | Stop reason: HOSPADM

## 2020-09-27 RX ORDER — SODIUM CHLORIDE 9 MG/ML
INJECTION, SOLUTION INTRAVENOUS CONTINUOUS
Status: DISCONTINUED | OUTPATIENT
Start: 2020-09-27 | End: 2020-09-29 | Stop reason: HOSPADM

## 2020-09-27 RX ORDER — IBUPROFEN 400 MG/1
400 TABLET ORAL EVERY 8 HOURS PRN
Status: DISCONTINUED | OUTPATIENT
Start: 2020-09-27 | End: 2020-09-29 | Stop reason: HOSPADM

## 2020-09-27 RX ADMIN — POTASSIUM CHLORIDE, DEXTROSE MONOHYDRATE AND SODIUM CHLORIDE: 150; 5; 450 INJECTION, SOLUTION INTRAVENOUS at 14:25

## 2020-09-27 RX ADMIN — KETOROLAC TROMETHAMINE 10 MG: 10 TABLET, FILM COATED ORAL at 19:01

## 2020-09-27 RX ADMIN — CIPROFLOXACIN 250 MG: 250 TABLET, FILM COATED ORAL at 09:05

## 2020-09-27 RX ADMIN — CIPROFLOXACIN 250 MG: 250 TABLET, FILM COATED ORAL at 21:34

## 2020-09-27 RX ADMIN — HEPARIN SODIUM 5000 UNITS: 10000 INJECTION, SOLUTION INTRAVENOUS; SUBCUTANEOUS at 09:05

## 2020-09-27 RX ADMIN — HEPARIN SODIUM 5000 UNITS: 10000 INJECTION, SOLUTION INTRAVENOUS; SUBCUTANEOUS at 21:34

## 2020-09-27 RX ADMIN — CALCIUM CARBONATE 500 MG: 500 TABLET, CHEWABLE ORAL at 09:05

## 2020-09-27 RX ADMIN — Medication 10 ML: at 09:19

## 2020-09-27 RX ADMIN — POTASSIUM CHLORIDE, DEXTROSE MONOHYDRATE AND SODIUM CHLORIDE: 150; 5; 450 INJECTION, SOLUTION INTRAVENOUS at 01:21

## 2020-09-27 RX ADMIN — SODIUM CHLORIDE: 9 INJECTION, SOLUTION INTRAVENOUS at 18:06

## 2020-09-27 ASSESSMENT — PAIN SCALES - GENERAL
PAINLEVEL_OUTOF10: 7
PAINLEVEL_OUTOF10: 0

## 2020-09-27 NOTE — PROGRESS NOTES
[facility]  PROGRESS NOTE    Chief Complaint:   Neurogenic bladder/Recurrent UTIs    HPI:   She feels well. She denies any pain or suprapubic tube discomfort. Her nausea has resolved    Vitals:    09/27/20 1208   BP: (!) 152/64   Pulse: 80   Resp: 18   Temp: 98 °F (36.7 °C)   SpO2:        Allergies: Codeine; Amlodipine; Augmentin [amoxicillin-pot clavulanate]; Bactrim; Benadryl [diphenhydramine]; Brovana [arformoterol]; Cardizem [diltiazem hcl];  Doxazosin; Ipratropium; Macrodantin [nitrofurantoin macrocrystal]; and Verapamil    PAST MEDICAL HISTORY:   Past Medical History:   Diagnosis Date    Arthritis     AVB (atrioventricular block) 6/20/2012    CAD (coronary artery disease)     Cancer ([facility] [address]     colon treated with surgery 1990's    Chronic kidney disease, stage 3 ([facility] [address]     begining of stage 3    Complicated UTI (urinary tract infection) 10/8/2017    Female bladder prolapse     for OR 9-25-20     GERD (gastroesophageal reflux disease)     [place] (hard of hearing)     Hypertension     uncontrolled BP    Hypokalemia 11/27/2018    Psoriasis     Symptomatic bradycardia        PAST SURGICAL HISTORY:   Past Surgical History:   Procedure Laterality Date    APPENDECTOMY      BACK SURGERY      CARDIAC CATHETERIZATION  02/10/2016    Dr. Patrica Haley single vessel disease, no stents placed     CHOLECYSTECTOMY      COLECTOMY      1990's     COLONOSCOPY      CYSTOSCOPY N/A 9/25/2020    CYSTOSCOPY SUPRAPUBIC TUBE INSERTION performed by Rob NUNN Abhishek, DO at [address] ECHO COMPLETE  6/20/2012         ENDOSCOPY, COLON, DIAGNOSTIC      HYSTERECTOMY      JOINT REPLACEMENT      left hip 2012    KYPHOSIS SURGERY      PACEMAKER PLACEMENT  6-    [facility] dual chamber - Dr. Marisela Kerr Right 10/15/2019    RIGHT HIP TOTAL ARTHROPLASTY performed by Pillo Khanna MD at [facility] OR        PAST FAMILY HISTORY:    Family History   Problem Relation Age of Onset    Heart Disease Mother     Heart Attack Mother     Other Father     Asthma Father        PAST SOCIAL HISTORY:    Social History     Socioeconomic History    Marital status:       Spouse name: None    Number of children: 2    Years of education: None    Highest education level: None   Occupational History    None   Social Needs    Financial resource strain: None    Food insecurity     Worry: None     Inability: None    Transportation needs     Medical: None     Non-medical: None   Tobacco Use    Smoking status: Former Smoker     Packs/day: 1.00     Years: 17.00     Pack years: 17.00     Types: Cigarettes     Start date: 1965     Last attempt to quit: 1982     Years since quittin.3    Smokeless tobacco: Never Used   Substance and Sexual Activity    Alcohol use: No    Drug use: No    Sexual activity: Never   Lifestyle    Physical activity     Days per week: None     Minutes per session: None    Stress: None   Relationships    Social connections     Talks on phone: None     Gets together: None     Attends Sabianist service: None     Active member of club or organization: None     Attends meetings of clubs or organizations: None     Relationship status: None    Intimate partner violence     Fear of current or ex partner: None     Emotionally abused: None     Physically abused: None     Forced sexual activity: None   Other Topics Concern    None   Social History Narrative    None       IV:    dextrose 5% and 0.45% NaCl with KCl 20 mEq 75 mL/hr at 20 1425       PRN: ketorolac, ibuprofen, acetaminophen, ondansetron, phenol, hydrOXYzine, sodium chloride flush, naphazoline-pheniramine, carboxymethylcellulose    Scheduled:    heparin (porcine)  5,000 Units Subcutaneous BID    ciprofloxacin  250 mg Oral 2 times per day    sodium chloride flush  10 mL Intravenous 2 times per day    [Held by provider] losartan  50 mg Oral Daily    [Held by provider] triamterene-hydroCHLOROthiazide  1 tablet Oral QAM    calcium carbonate  1 tablet Oral Daily       Lab Results   Component Value Date     09/27/2020    K 4.0 09/27/2020    K 3.9 01/25/2020    BUN 15 09/27/2020    CREATININE 0.9 09/27/2020        Lab Results   Component Value Date    HGB 11.8 09/27/2020    HCT 37.1 09/27/2020       Review Of Systems:  Constitutional: Tired  Eyes: negative  Ears, nose, mouth, throat, and face: negative  Respiratory: negative  Cardiovascular: Hypertension  Gastrointestinal: GERD  Genitourinary: Neurogenic bladder  Musculoskeletal: Arthritis  Neurological: negative  Behavioral/Psych: negative  Endocrine: negative    Physical Exam:  Skin dry, without rashes  Respirations non-labored, intact  Abdomen soft, non-tender, non-distended. Active bowel sounds. Alert and oriented x3  Suprapubic tube is draining clear, yellow urine. Scant bloody drainage around the suprapubic tube site    Assessment and Plan:  POD#2--S/P Cysto/SP Tube insertion  -Continue empiric antibiotics  -Change SP tube dressing daily and as needed  -Patient has a codeine allergy. Tylenol, ibuprofen, oral Toradol as needed for pain control  -Continue DVT prophylaxis  -Home care has been consulted for assistance with discharge planning.   Patient remains stable for discharge today, however, her health insurance requires prior authorization for home care arrangements to be made and, therefore, she will be kept in the hospital until likelyTuesday which is not necessary from a medical standpoint  -SP tube will be changed in the office in roughly 4 weeks      Angela Hurst MD  9/27/2020  2:58 PM

## 2020-09-27 NOTE — PROGRESS NOTES
Subjective:  Feeling better notes urine pink  No CP or SOB  No fever or chills   No uncontrolled pain  No vomiting or diarrhea      Objective:    BP (!) 152/64   Pulse 80   Temp 98 °F (36.7 °C) (Oral)   Resp 18   Ht 5' 1\" (1.549 m)   Wt 152 lb (68.9 kg)   SpO2 95%   BMI 28.72 kg/m²     24HR INTAKE/OUTPUT:      Intake/Output Summary (Last 24 hours) at 9/27/2020 1515  Last data filed at 9/27/2020 1449  Gross per 24 hour   Intake 120 ml   Output 2600 ml   Net -2480 ml       General appearance: NAD, conversant  Neck: FROM, supple   Lungs: Clear bilaterally no wheezes, no rhonchi, no crackles  CV: RRR, no MRGs; normal carotid upstroke and amplitude without Bruits  Abdomen: Soft, non-tender; no masses or HSM  Extremities: No edema, no cyanosis, no clubbing  Skin: Intact no rash, no lesions, no ulcers    Psych: Alert and oriented normal affect  Neuro: Nonfocal  Most Recent Labs  Lab Results   Component Value Date    WBC 6.3 09/27/2020    HGB 11.8 09/27/2020    HCT 37.1 09/27/2020     09/27/2020     (L) 09/27/2020    K 4.0 09/27/2020     09/27/2020    CREATININE 0.9 09/27/2020    BUN 15 09/27/2020    CO2 22 09/27/2020    GLUCOSE 121 (H) 09/27/2020    ALT 16 02/13/2020    AST 27 02/13/2020    INR 1.0 01/25/2020    TSH 2.680 01/28/2019    LABMICR <12.0 01/13/2017     No results for input(s): MG in the last 72 hours. Lab Results   Component Value Date    CALCIUM 8.8 09/27/2020        No orders to display       Assessment    Principal Problem:    Neurogenic bladder  Active Problems:    Pacemaker    COPD (chronic obstructive pulmonary disease) ([facility] [address]    Essential hypertension    Hypotension  Resolved Problems:    * No resolved hospital problems.  *      Plan:  66-year-old female history of neurogenic bladder, COPD, hypertension admitted status post cystoscopy with suprapubic tube placement     Continue hold BP meds  Check labs  IV fluids changed fluids to loss  Monitor BMP  Empiric antibiotics  Phenol spray for post intubation pharyngitis  Atarax for pruritus  Pain control  Bowel regimen  PT/OT  Medications for other co morbidities cont as appropriate w dosage adjustments as necessary      Thank you for allowing me to participate in the care of this patient    Electronically signed by Nelly Santos MD on 9/27/2020 at 3:15 PM

## 2020-09-28 LAB
ANION GAP SERPL CALCULATED.3IONS-SCNC: 7 MMOL/L (ref 7–16)
BUN BLDV-MCNC: 16 MG/DL (ref 8–23)
CALCIUM SERPL-MCNC: 8.8 MG/DL (ref 8.6–10.2)
CHLORIDE BLD-SCNC: 105 MMOL/L (ref 98–107)
CO2: 20 MMOL/L (ref 22–29)
CREAT SERPL-MCNC: 0.8 MG/DL (ref 0.5–1)
GFR AFRICAN AMERICAN: >60
GFR NON-AFRICAN AMERICAN: >60 ML/MIN/1.73
GLUCOSE BLD-MCNC: 121 MG/DL (ref 74–99)
POTASSIUM SERPL-SCNC: 4.1 MMOL/L (ref 3.5–5)
SODIUM BLD-SCNC: 132 MMOL/L (ref 132–146)

## 2020-09-28 PROCEDURE — 96372 THER/PROPH/DIAG INJ SC/IM: CPT

## 2020-09-28 PROCEDURE — 80048 BASIC METABOLIC PNL TOTAL CA: CPT

## 2020-09-28 PROCEDURE — 36415 COLL VENOUS BLD VENIPUNCTURE: CPT

## 2020-09-28 PROCEDURE — 6370000000 HC RX 637 (ALT 250 FOR IP): Performed by: INTERNAL MEDICINE

## 2020-09-28 PROCEDURE — 6360000002 HC RX W HCPCS: Performed by: UROLOGY

## 2020-09-28 PROCEDURE — 2580000003 HC RX 258: Performed by: INTERNAL MEDICINE

## 2020-09-28 PROCEDURE — 6370000000 HC RX 637 (ALT 250 FOR IP): Performed by: UROLOGY

## 2020-09-28 PROCEDURE — G0378 HOSPITAL OBSERVATION PER HR: HCPCS

## 2020-09-28 RX ADMIN — CIPROFLOXACIN 250 MG: 250 TABLET, FILM COATED ORAL at 08:01

## 2020-09-28 RX ADMIN — HEPARIN SODIUM 5000 UNITS: 10000 INJECTION, SOLUTION INTRAVENOUS; SUBCUTANEOUS at 21:00

## 2020-09-28 RX ADMIN — CALCIUM CARBONATE 500 MG: 500 TABLET, CHEWABLE ORAL at 08:00

## 2020-09-28 RX ADMIN — HEPARIN SODIUM 5000 UNITS: 10000 INJECTION, SOLUTION INTRAVENOUS; SUBCUTANEOUS at 08:00

## 2020-09-28 RX ADMIN — CIPROFLOXACIN 250 MG: 250 TABLET, FILM COATED ORAL at 21:00

## 2020-09-28 RX ADMIN — SODIUM CHLORIDE: 9 INJECTION, SOLUTION INTRAVENOUS at 21:09

## 2020-09-28 RX ADMIN — SALINE NASAL SPRAY 1 SPRAY: 1.5 SOLUTION NASAL at 17:44

## 2020-09-28 ASSESSMENT — PAIN SCALES - GENERAL
PAINLEVEL_OUTOF10: 0
PAINLEVEL_OUTOF10: 0

## 2020-09-28 NOTE — CARE COORDINATION
Met with patient about diagnosis and discharge plan of care. Pt lives alone in ranch home. Pt with new suprapubic cath. Will need home care, referral called to [facility] care-awaiting call back to see if they can accept.  Will follow-MJO

## 2020-09-28 NOTE — PROGRESS NOTES
[facility] Quality Flow/Interdisciplinary Rounds Progress Note        Quality Flow Rounds held on September 28, 2020    Disciplines Attending:  Bedside Nurse, ,  and Nursing Unit 50 Gene Linares was admitted on 9/25/2020 12:18 PM    Anticipated Discharge Date:       Disposition:    Justino Score:  Justino Scale Score: 20    Readmission Risk              Risk of Unplanned Readmission:        0           Discussed patient goal for the day, patient clinical progression, and barriers to discharge.   The following Goal(s) of the Day/Commitment(s) have been identified:  [facility]  September 28, 2020

## 2020-09-28 NOTE — HOME CARE
Referral received for [facility]. Spoke with patient. Leo verified. Will follow for discharge plan.    Sandee Arnold LPN/MINDY

## 2020-09-28 NOTE — PROGRESS NOTES
9/28/2020 11:01 AM  Service: Urology  Group: [facility] (Abhishek/Burton/Mj)    Ion Kee  [id number]    Subjective:    Sitting up in a chair  No complaints today  Pleasant  No pain  SPT draining yellow urine   No fever or chills      Review of Systems  Constitutional: No fever or chills   Respiratory: negative for cough and hemoptysis  Cardiovascular: negative for chest pain and dyspnea  Gastrointestinal: negative for abdominal pain, diarrhea, nausea and vomiting   : See above  Derm: negative for rash and skin lesion(s)  Neurological: negative for seizures and tremors  Musculoskeletal: Negative    Psychiatric: Negative   All other reviews are negative      Scheduled Meds:   heparin (porcine)  5,000 Units Subcutaneous BID    ciprofloxacin  250 mg Oral 2 times per day    sodium chloride flush  10 mL Intravenous 2 times per day    [Held by provider] losartan  50 mg Oral Daily    [Held by provider] triamterene-hydroCHLOROthiazide  1 tablet Oral QAM    calcium carbonate  1 tablet Oral Daily       Objective:  Vitals:    09/28/20 0745   BP: (!) 142/63   Pulse: 70   Resp: 16   Temp: 98 °F (36.7 °C)   SpO2: 99%         Allergies: Codeine; Amlodipine; Augmentin [amoxicillin-pot clavulanate]; Bactrim; Benadryl [diphenhydramine]; Brovana [arformoterol]; Cardizem [diltiazem hcl];  Doxazosin; Ipratropium; Macrodantin [nitrofurantoin macrocrystal]; and Verapamil    General Appearance: alert and oriented to person, place and time and in no acute distress  Skin: no rash or erythema  Head: normocephalic and atraumatic  Pulmonary/Chest: normal air movement, no respiratory distress  Abdomen: soft, non-tender, non-distended  Genitourinary: SPT intact, draining yellow urine with some sediment   Extremities: no cyanosis, clubbing or edema         Labs:     Recent Labs     09/28/20  0430      K 4.1      CO2 20*   BUN 16   CREATININE 0.8   GLUCOSE 121*   CALCIUM 8.8       Lab Results   Component Value Date    HGB 11.8 09/27/2020    HCT 37.1 09/27/2020         Assessment/Plan:  POD#2 cystoscopy, suprapubic tube insertion     Cont Cipro   Cont the SPT  SPT will need changed in our office in about 4 weeks   She remains hospitalized for assistance with discharge planning only. She is stable for discharge from  standpoint. However, she needs to have prior authorization for home care through her health insurance requiring continued hospital stay  We will cont to follow her pending her discharge home [address], [address] [place] [address]  Urolog    I agree with the nurse practitioner assessment and plan. I personally evaluated the patient and made any changes to reflect my impression and plan. Plan for DC hopefully tomorrow. No medical need for her admission at this time.      Tori Davenport MD

## 2020-09-28 NOTE — PLAN OF CARE
Problem: Pain:  Goal: Pain level will decrease  Description: Pain level will decrease  Outcome: Met This Shift     Problem: Falls - Risk of:  Goal: Will remain free from falls  Description: Will remain free from falls  Outcome: Met This Shift     Problem: Physical Regulation:  Goal: Will remain free from infection  Description: Will remain free from infection  Outcome: Met This Shift

## 2020-09-29 VITALS
RESPIRATION RATE: 16 BRPM | OXYGEN SATURATION: 100 % | SYSTOLIC BLOOD PRESSURE: 146 MMHG | DIASTOLIC BLOOD PRESSURE: 65 MMHG | BODY MASS INDEX: 28.7 KG/M2 | WEIGHT: 152 LBS | HEIGHT: 61 IN | TEMPERATURE: 98.2 F | HEART RATE: 79 BPM

## 2020-09-29 PROCEDURE — G0378 HOSPITAL OBSERVATION PER HR: HCPCS

## 2020-09-29 PROCEDURE — 6370000000 HC RX 637 (ALT 250 FOR IP): Performed by: INTERNAL MEDICINE

## 2020-09-29 PROCEDURE — 96372 THER/PROPH/DIAG INJ SC/IM: CPT

## 2020-09-29 PROCEDURE — 6370000000 HC RX 637 (ALT 250 FOR IP): Performed by: UROLOGY

## 2020-09-29 PROCEDURE — 6360000002 HC RX W HCPCS: Performed by: UROLOGY

## 2020-09-29 RX ADMIN — HEPARIN SODIUM 5000 UNITS: 10000 INJECTION, SOLUTION INTRAVENOUS; SUBCUTANEOUS at 09:33

## 2020-09-29 RX ADMIN — ACETAMINOPHEN 650 MG: 325 TABLET ORAL at 12:31

## 2020-09-29 RX ADMIN — CALCIUM CARBONATE 500 MG: 500 TABLET, CHEWABLE ORAL at 09:34

## 2020-09-29 RX ADMIN — ACETAMINOPHEN 650 MG: 325 TABLET ORAL at 02:47

## 2020-09-29 RX ADMIN — CIPROFLOXACIN 250 MG: 250 TABLET, FILM COATED ORAL at 09:32

## 2020-09-29 ASSESSMENT — PAIN SCALES - GENERAL
PAINLEVEL_OUTOF10: 0
PAINLEVEL_OUTOF10: 3
PAINLEVEL_OUTOF10: 5

## 2020-09-29 NOTE — PATIENT CARE CONFERENCE
P Quality Flow/Interdisciplinary Rounds Progress Note        Quality Flow Rounds held on September 29, 2020    Disciplines Attending:  Bedside Nurse, ,  and Nursing Unit 50 Gene Linares was admitted on 9/25/2020 12:18 PM    Anticipated Discharge Date:       Disposition:    Justino Score:  Justino Scale Score: 20    Readmission Risk              Risk of Unplanned Readmission:        0           Discussed patient goal for the day, patient clinical progression, and barriers to discharge. The following Goal(s) of the Day/Commitment(s) have been identified:  Possible discharge home with [facility] later today. Await acceptance by [facility].       Cha Show  September 29, 2020

## 2020-09-29 NOTE — PLAN OF CARE
Problem: Pain:  Goal: Pain level will decrease  Description: Pain level will decrease  9/29/2020 0913 by Virginia Rajput RN  Outcome: Met This Shift     Problem: Nutritional:  Goal: Nutritional status will improve  Description: Nutritional status will improve  Outcome: Met This Shift     Problem: Physical Regulation:  Goal: Ability to maintain vital signs within normal range will improve  Description: Ability to maintain vital signs within normal range will improve  Outcome: Met This Shift

## 2020-09-29 NOTE — PROGRESS NOTES
CLINICAL PHARMACY NOTE: MEDS TO [address] [place] [address] Drive Select Patient?: No  Total # of Prescriptions Filled: 1   The following medications were delivered to the patient:  · Ketorolac trom 10mg  Total # of Interventions Completed: 4  Time Spent (min): 45    Additional Documentation:

## 2020-09-29 NOTE — PLAN OF CARE
Problem: Pain:  Goal: Pain level will decrease  Description: Pain level will decrease  Outcome: Met This Shift     Problem: Pain:  Goal: Control of acute pain  Description: Control of acute pain  Outcome: Met This Shift     Problem: Falls - Risk of:  Goal: Will remain free from falls  Description: Will remain free from falls  Outcome: Met This Shift     Problem: Physical Regulation:  Goal: Will remain free from infection  Description: Will remain free from infection  Outcome: Met This Shift

## 2020-09-29 NOTE — DISCHARGE SUMMARY
Patient ID:  Leila Shaver  [id number]  80 y.o.  1935    Admit date: 9/25/2020    Discharge date and time: 9/29/2020    Admitting Physician: Shikha Weiss DO     Admission Diagnoses: Neurogenic bladder [N31.9]    Discharge Diagnoses: same    Hospital Course: uneventful, required prior authorization for home health care arrangements which required extended hospital stay.  She remained medically stable throughout her hospital stay     Treatments: antibiotics: Ancef and Cipro and surgery: cystoscopy, suprapubic tube insertion     Disposition: home    Condition: stable, home    Patient Instructions:   Current Discharge Medication List      START taking these medications    Details   ketorolac (TORADOL) 10 MG tablet Take 1 tablet by mouth every 6 hours as needed for Pain  Qty: 20 tablet, Refills: 0         CONTINUE these medications which have NOT CHANGED    Details   carboxymethylcellulose 1 % ophthalmic solution Place 1 drop into both eyes daily as needed for Dry Eyes      tetrahydrozoline 0.05 % ophthalmic solution Place 1 drop into both eyes daily as needed      calcium carbonate (TUMS) 500 MG chewable tablet Take 1 tablet by mouth daily      aspirin 81 MG EC tablet Take 1 tablet by mouth 2 times daily  Qty: 30 tablet, Refills: 3      acetaminophen (TYLENOL) 500 MG tablet Take 250 mg by mouth every 4 hours as needed for Pain       irbesartan (AVAPRO) 150 MG tablet Take 150 mg by mouth every evening   Refills: 1      triamterene-hydrochlorothiazide (MAXZIDE-25) 37.5-25 MG per tablet Take 1 tablet by mouth every morning   Refills: 0      sodium chloride (OCEAN, BABY AYR) 0.65 % nasal spray 1 spray by Nasal route as needed for Congestion      Cholecalciferol (VITAMIN D) 50 MCG (2000 UT) TABS tablet Take 2,000 Units by mouth daily                Signed:  AYAN Blevins CNP  9/29/2020  2:43 PM

## 2020-09-29 NOTE — PROGRESS NOTES
9/29/2020 2:39 PM  Service: Urology  Group: [facility] (Abhishek/Burton/Mj)    Kamilla Cote  [id number]    Subjective:    No new complaints today  Sitting up in chair  Doing well  Really wants to go home  Hopefully home today if home care is finalized       Review of Systems  Constitutional: No fever or chills   Respiratory: negative for cough and hemoptysis  Cardiovascular: negative for chest pain and dyspnea  Gastrointestinal: negative for abdominal pain, diarrhea, nausea and vomiting   : See above  Derm: negative for rash and skin lesion(s)  Neurological: negative for seizures and tremors  Musculoskeletal: Negative    Psychiatric: Negative   All other reviews are negative      Scheduled Meds:   sodium chloride  1 spray Each Nostril Daily    heparin (porcine)  5,000 Units Subcutaneous BID    sodium chloride flush  10 mL Intravenous 2 times per day    [Held by provider] losartan  50 mg Oral Daily    [Held by provider] triamterene-hydroCHLOROthiazide  1 tablet Oral QAM    calcium carbonate  1 tablet Oral Daily       Objective:  Vitals:    09/29/20 1316   BP: (!) 146/65   Pulse: 79   Resp: 16   Temp: 98.2 °F (36.8 °C)   SpO2:          Allergies: Codeine; Amlodipine; Augmentin [amoxicillin-pot clavulanate]; Bactrim; Benadryl [diphenhydramine]; Brovana [arformoterol]; Cardizem [diltiazem hcl];  Doxazosin; Ipratropium; Macrodantin [nitrofurantoin macrocrystal]; and Verapamil    General Appearance: alert and oriented to person, place and time and in no acute distress  Skin: no rash or erythema  Head: normocephalic and atraumatic  Pulmonary/Chest: normal air movement, no respiratory distress  Abdomen: soft, non-tender, non-distended  Genitourinary: SPT intact, draining yellow urine with some sediment   Extremities: no cyanosis, clubbing or edema         Labs:     Recent Labs     09/28/20  0430      K 4.1      CO2 20*   BUN 16   CREATININE 0.8   GLUCOSE 121*   CALCIUM 8.8       Lab Results   Component Value Date    HGB 11.8 09/27/2020    HCT 37.1 09/27/2020         Assessment/Plan:  POD#3 cystoscopy, suprapubic tube insertion     Has finished antibiotics  Remains fever and pain free   Cont the SPT  SPT will need changed in our office in about 4 weeks   She is ok for DC today when home care arrangements have been made  Needs leg bag and standard bag as well as wilde teaching

## 2020-10-20 NOTE — PROGRESS NOTES
Office of Dr Dakotah Horan called for discharge and was told that he would be making rounds soon. Patient : Delon Fregoso Age: 62 year old Sex: male   MRN: [id number] Encounter Date: 10/20/2020      History     Chief Complaint   Patient presents with   • Medical Screening Exam     E06/06   HPI  10/20/2020  7:21 PM Delon Fregoso is a 62 year old male who presents to the ED with PD from home for evaluation of medical clearance for assisted. PD states the pt is intoxicated and requires medical clearance. The pt denies gross pain, fall, injury, fever, chills, cough, CP, N/V, or any other complaints. He denies nausea or continued bleeding from a nosebleed yesterday but reports mild residual nasal pain. Per PD, the pt was arrested for domestic violence but the officer does not believe it was physical altercation. Patient is intoxicated. There are no further complaints or modifying factors at this time.    PCP: No Pcp     7:17 PM Chart Review: I reviewed the patient's medications, allergies, and past medical and surgical history in [facility]. The pt was seen at [facility] this afternoon after falling down the stairs at 1:30 this morning. He had nasal pain and bleeding. The pt had a normal head and neck CT. His CT facial bones revealed a possible R nasal bone fracture.     Allergies   Allergen Reactions   • Adult Aspirin Low GI UPSET     heartburn   • Motrin GI UPSET       Current Discharge Medication List      Prior to Admission Medications    Details   clopidogrel (PLAVIX) 75 MG tablet Take 8 tablets on day one. All other days take 1 tablet daily.  Qty: 98 tablet, Refills: 3      varenicline (CHANTIX IVA) 0.5 MG X 11 & 1 MG X 42 tablet Take as directed.  Qty: 53 tablet, Refills: 0      losartan (COZAAR) 25 MG tablet Take 0.5 tablets by mouth daily.  Qty: 15 tablet, Refills: 3      atorvastatin (LIPITOR) 80 MG tablet Take 1 tablet by mouth nightly.  Qty: 30 tablet, Refills: 11      carvedilol (COREG) 6.25 MG tablet Take 1 tablet by mouth every 12 hours.  Qty: 60 tablet, Refills: 11      aspirin 81 MG chewable tablet Chew 1 tablet by  mouth daily. Do not start before January 26, 2020.             Past Medical History:   Diagnosis Date   • Alcohol use disorder, moderate, in sustained remission (CMS/HCC)     last alcohol 2012       Past Surgical History:   Procedure Laterality Date   • FRACTURE SURGERY      right elbow   • ORIF FINGER FRACTURE Left 2008   • ORIF HUMERUS FRACTURE Right 1967    supracondylar   • XRAY MAMMOGRAM SCREENING BILAT  12/16/2009       Family History   Problem Relation Age of Onset   • Hypertension Father    • Stroke Father    • Alcohol Abuse Father    • Hypertension Mother    • Hypertension Brother        Social History     Tobacco Use   • Smoking status: Current Every Day Smoker     Packs/day: 1.00     Years: 30.00     Pack years: 30.00     Types: Cigarettes   • Smokeless tobacco: Never Used   • Tobacco comment: has been cutting back   Substance Use Topics   • Alcohol use: Yes     Alcohol/week: 21.0 standard drinks     Types: 21 Standard drinks or equivalent per week   • Drug use: Yes     Types: Marijuana       E-cigarette/Vaping   • E-Cigarette/Vaping Use Current Every Day User    • Cartridges / Day catridge last a few days      E-Cigarette/Vaping Substances & Devices       Review of Systems   Constitutional: Negative for chills and fever.   HENT:        Positive for nasal pain.    Respiratory: Negative for cough and shortness of breath.    Cardiovascular: Negative for chest pain.   Gastrointestinal: Negative for abdominal pain, constipation, diarrhea, nausea and vomiting.   Genitourinary: Negative for difficulty urinating and dysuria.   Musculoskeletal: Negative for neck pain and neck stiffness.   Skin: Negative for color change and rash.   Neurological: Negative for dizziness, light-headedness and headaches.       Physical Exam     ED Triage Vitals [10/20/20 1907]   ED Triage Vitals Group      Temp 97.9 °F (36.6 °C)      Heart Rate 96      Resp 20      BP (!) 180/112      SpO2 96 %      EtCO2 mmHg       Height 5' 6\"  (1.676 m)      Weight 153 lb 10.6 oz (69.7 kg)      Weight Scale Used Scale in bed      BMI (Calculated) 24.8      IBW/kg (Calculated) 63.8       Physical Exam   Constitutional: He is oriented to person, place, and time. Vital signs are normal. He appears well-developed and well-nourished. He is cooperative.  Non-toxic appearance. He does not appear ill. No distress.   Resting comfortably. Tearful.      HENT:   Head: Normocephalic and atraumatic.   Band-Aid to nasal bridge. Nasal swelling. No active bleeding.  No sign of scalp trauma. Scalp non tender.   Eyes: Pupils are equal, round, and reactive to light. Conjunctivae and EOM are normal. No scleral icterus.   Neck: Normal range of motion. Neck supple.   Cardiovascular: Normal rate, regular rhythm, S1 normal, S2 normal, normal heart sounds and intact distal pulses.   No murmur heard.  Pulmonary/Chest: Effort normal and breath sounds normal. No respiratory distress. He has no decreased breath sounds. He has no wheezes. He has no rhonchi. He has no rales. He exhibits no tenderness.   Speaking in full sentences.    Abdominal: Soft. Normal appearance and bowel sounds are normal. He exhibits no distension and no mass. There is no abdominal tenderness. There is no rigidity, no rebound, no guarding and no CVA tenderness.   Musculoskeletal: Normal range of motion.         General: No edema.      Cervical back: He exhibits no tenderness.      Thoracic back: He exhibits no tenderness.      Lumbar back: He exhibits no tenderness.      Comments: Turning head from side to side without difficulty. Moving all 4 extremities without difficulty.   Neurological: He is alert and oriented to person, place, and time. He exhibits normal muscle tone. Coordination normal. GCS eye subscore is 4. GCS verbal subscore is 5. GCS motor subscore is 6.    No slurred speech.   Skin: Skin is warm and dry. No rash noted. He is not diaphoretic. No pallor.   Psychiatric: He has a normal mood and  affect. His speech is normal and behavior is normal.   Nursing note and vitals reviewed.      ED Course     Procedures    Lab Results     No results found for this visit on 10/20/20.    EKG Results     No EKG.     Radiology Results     Imaging Results    None         ED Medication Orders (From admission, onward)    Ordered Start     Status Ordering Provider    10/20/20 1936 10/20/20 1937  acetaminophen (TYLENOL) tablet 650 mg  ONCE      Last MAR action: Given UDAY GUZMAN               [facility]  Vitals  Vitals:    10/20/20 1907   BP: (!) 180/112   Pulse: 96   Resp: 20   Temp: 97.9 °F (36.6 °C)   SpO2: 96%   Weight: 69.7 kg   Height: 5' 6\" (1.676 m)       ED Course    Initial Impression: 62 year old male presenting for medical clearance with PD. PD states the pt may be intoxicated. Pt admits to this. The pt has residual nasal pain from a fall with epistaxis for which he was seen in a different ED today, but otherwise denies pain, injury, or any other complaints. He is alert to person, place, and time. The pt has a benign physical exam. I will order Tylenol for the pt and release him to PD custody. Pt to follow up with his PCP. Indications to return to the ED were given. The pt understands and agrees with the plan. All questions were addressed.     [facility]  Critical Care time spent on this patient outside of billable procedures:  None    Clinical Impression  ED Diagnoses        Final diagnoses    Medical clearance for incarceration          Alcoholic intoxication without complication (CMS/[facility])                Follow Up:  Your PCP    Schedule an appointment as soon as possible for a visit   Call and schedule follow-up for re-evaluation, be seen sooner for new or worsening symptoms.    [facility]  [address]  [place] [address]  [phone]    If symptoms worsen    [facility]  [address]  [place] [id number] [phone]  Schedule an appointment as soon as  possible for a visit   Call and schedule follow-up for re-evaluation, with primary care provider, to establish care, as needed       Pt is discharged to home/self care in stable condition.      I have reviewed the information recorded by the scribe for accuracy and agree with its contents.    ____________________________________________________________________    Claudia Gr acting as a scribe for Cailin Bro PA-C.    Cailin Bro PA-C  Dictation # [id number]  Scribe: Claudia Gr    Attending Physician: Dr. Israel Hester  SER # [id number]       Cailin Bro PA-C  10/20/20 2288

## 2020-11-08 ENCOUNTER — APPOINTMENT (OUTPATIENT)
Dept: GENERAL RADIOLOGY | Age: 85
End: 2020-11-08
Payer: MEDICARE

## 2020-11-08 ENCOUNTER — APPOINTMENT (OUTPATIENT)
Dept: CT IMAGING | Age: 85
End: 2020-11-08
Payer: MEDICARE

## 2020-11-08 ENCOUNTER — HOSPITAL ENCOUNTER (EMERGENCY)
Age: 85
Discharge: HOME | End: 2020-11-08
Attending: EMERGENCY MEDICINE
Payer: MEDICARE

## 2020-11-08 VITALS
HEART RATE: 74 BPM | DIASTOLIC BLOOD PRESSURE: 64 MMHG | SYSTOLIC BLOOD PRESSURE: 144 MMHG | HEIGHT: 61 IN | WEIGHT: 152 LBS | BODY MASS INDEX: 28.7 KG/M2 | RESPIRATION RATE: 16 BRPM | OXYGEN SATURATION: 100 % | TEMPERATURE: 96.9 F

## 2020-11-08 PROCEDURE — 73502 X-RAY EXAM HIP UNI 2-3 VIEWS: CPT

## 2020-11-08 PROCEDURE — 99282 EMERGENCY DEPT VISIT SF MDM: CPT

## 2020-11-08 PROCEDURE — 72131 CT LUMBAR SPINE W/O DYE: CPT

## 2020-11-08 PROCEDURE — 96372 THER/PROPH/DIAG INJ SC/IM: CPT

## 2020-11-08 PROCEDURE — 6360000002 HC RX W HCPCS: Performed by: NURSE PRACTITIONER

## 2020-11-08 RX ORDER — NAPROXEN 375 MG/1
375 TABLET ORAL 2 TIMES DAILY WITH MEALS
Qty: 60 TABLET | Refills: 0 | Status: SHIPPED | OUTPATIENT
Start: 2020-11-08 | End: 2021-06-09

## 2020-11-08 RX ORDER — KETOROLAC TROMETHAMINE 30 MG/ML
30 INJECTION, SOLUTION INTRAMUSCULAR; INTRAVENOUS ONCE
Status: COMPLETED | OUTPATIENT
Start: 2020-11-08 | End: 2020-11-08

## 2020-11-08 RX ADMIN — KETOROLAC TROMETHAMINE 30 MG: 30 INJECTION, SOLUTION INTRAMUSCULAR at 13:23

## 2020-11-08 ASSESSMENT — PAIN SCALES - GENERAL
PAINLEVEL_OUTOF10: 5
PAINLEVEL_OUTOF10: 10

## 2020-11-08 NOTE — ED NOTES
Patient ambulated length of room and back with minimal assistance; states she has a walker at home and is confident she will be able to be discharged home with no concerns/difficulty with ADLs. States she has a home health nurse that comes once a week but is able to call nurse for additional help/assistance if needed. Denies any acute/excruciating pain.   Dr Derek Mello notified,     Quincy Camacho  11/08/20 2043

## 2020-11-08 NOTE — ED NOTES
Bed: 37  Expected date:   Expected time:   Means of arrival:   Comments:  Kaushik Alaniz RN  11/08/20 1913

## 2020-11-08 NOTE — ED NOTES
Patient's neighbor, Olinda Ray, called for ride.   He will be here to  patient soon      Neno   11/08/20 5519

## 2020-11-08 NOTE — ED PROVIDER NOTES
ED Attending  CC: Linda             Department of Emergency Medicine   ED  Provider Note  Admit Date/RoomTime: 11/8/2020 12:40 PM  ED Room: 37/37  MRN: [id number]  Chief Complaint: Back Pain (was gardening, lifted something- felt a pop. Initially denied pain; now having sharp pain like a fist in her lower back and \"feels like its  swollen\")       History of Present Illness   Source of history provided by:  patient. History/Exam Limitations: none. Lilly Churchill is a 80 y.o. female who presents to the ED by ambulance for low back pain, beginning less than 1 hour ago and are stable since that time. The complaint has been intermittent, mild in severity. Patient states she was bending forward at the waist picking up a concrete statue in her garden and felt a [place]. \"  Denies history of stress fractures. States she went down to her hands and knees and called into the house for fear of exacerbating any possible injury. States she was trying to make her way into a chair to call for help and fell backwards onto her left buttock/hip. Also complains of left hip pain. States she has bilateral hip arthroplasty. Denies loss of consciousness, striking her head, neck pain, chest pain, shortness of breath, abdominal pain, nausea, vomiting, diarrhea, dizziness, diplopia. ROS    Pertinent positives and negatives are stated within HPI, all other systems reviewed and are negative. has a past medical history of Arthritis, AVB (atrioventricular block), CAD (coronary artery disease), Cancer ([address] Chronic kidney disease, stage 3 ([address] Complicated UTI (urinary tract infection), Female bladder prolapse, GERD (gastroesophageal reflux disease), [place] (hard of hearing), Hypertension, Hypokalemia, Psoriasis, and Symptomatic bradycardia. has a past surgical history that includes Cholecystectomy; Kyphosis surgery; Echo Complete (6/20/2012); Hysterectomy; Endoscopy, colon, diagnostic; Colonoscopy; Tonsillectomy;  Appendectomy; no clubbing, cyanosis, or edema. Capillary refill <3 seconds  · Integument: skin warm and dry. No rashes. · Lymphatic: no lymphadenopathy noted  · Neurologic: GCS 15, no focal deficits, symmetric strength 5/5 in the upper and lower extremities bilaterally  · Psychiatric: Normal Affect    Lab / Imaging Results   (All laboratory and radiology results have been personally reviewed by myself)  Labs:  No results found for this visit on 11/08/20. Imaging: All Radiology results interpreted by Radiologist unless otherwise noted. XR HIP 2-3 VW W PELVIS LEFT   Final Result   No fracture or dislocation of pelvis or left hip arthroplasty. CT Lumbar Spine WO Contrast   Final Result   1. No acute fracture. 2. Unchanged grade 1 anterolisthesis of L3 on L4. ED Course / Medical Decision Making     Medications   ketorolac (TORADOL) injection 30 mg (30 mg Intramuscular Given 11/8/20 1323)        Re-examination:  11/8/20       Time: 1445 hrs. Patient's condition improving    Consult(s):   None    Procedure(s):   none    MDM:   Patient arrived via EMS for low back pain after bending forward at the waist and lifting a heavy concrete statue. Denies fall, trauma. Patient called into her home for fear of causing more injury and while attempting to get into a chair fell onto her left buttock. Complains of left hip pain warranted imaging that revealed no acute disease process. CT lumbar spine shows no acute disease process. Patient's pain resolved with IM Toradol here in the ER. Patient ambulated without complication here in the ED. No evidence of cauda equina. No history of IV drug abuse. No evidence of systemic infection. Patient discharged home with prescription for NSAID for symptomatic relief. Patient vies if symptoms recur to return the emergency department immediately, otherwise follow-up with primary care provider.   This patient was also seen the emergency department by  Beth. Counseling:  Emergency Attending Physician and I reviewed today's visit with the patient in addition to providing specific details for the plan of care and counseling regarding the diagnosis and prognosis. Questions are answered at this time and are agreeable with the plan. Assessment      1. Strain of lumbar region, initial encounter    2. Acute left-sided low back pain, unspecified whether sciatica present      Plan   Discharge to home  Patient condition is stable    New Medications     Discharge Medication List as of 11/8/2020  4:16 PM      START taking these medications    Details   naproxen (NAPROSYN) 375 MG tablet Take 1 tablet by mouth 2 times daily (with meals), Disp-60 tablet,R-0Print           Electronically signed by AYAN Mcfarland CNP   DD: 11/8/20  **This report was transcribed using voice recognition software. Every effort was made to ensure accuracy; however, inadvertent computerized transcription errors may be present.   END OF ED PROVIDER NOTE       AYAN Brown CNP  11/08/20 7131

## 2020-12-02 ENCOUNTER — NURSE ONLY (OUTPATIENT)
Dept: NON INVASIVE DIAGNOSTICS | Age: 85
End: 2020-12-02
Payer: MEDICARE

## 2020-12-02 PROCEDURE — 93296 REM INTERROG EVL PM/IDS: CPT | Performed by: INTERNAL MEDICINE

## 2020-12-02 PROCEDURE — 93294 REM INTERROG EVL PM/LDLS PM: CPT | Performed by: INTERNAL MEDICINE

## 2021-01-13 LAB
ALBUMIN SERPL-MCNC: 4.2 G/DL (ref 3.5–5.2)
ALP BLD-CCNC: 121 U/L (ref 35–104)
ALT SERPL-CCNC: 24 U/L (ref 0–32)
ANION GAP SERPL CALCULATED.3IONS-SCNC: 13 MMOL/L (ref 7–16)
AST SERPL-CCNC: 35 U/L (ref 0–31)
BASOPHILS ABSOLUTE: 0.06 E9/L (ref 0–0.2)
BASOPHILS RELATIVE PERCENT: 0.6 % (ref 0–2)
BILIRUB SERPL-MCNC: 0.4 MG/DL (ref 0–1.2)
BUN BLDV-MCNC: 28 MG/DL (ref 8–23)
CALCIUM SERPL-MCNC: 9.8 MG/DL (ref 8.6–10.2)
CHLORIDE BLD-SCNC: 101 MMOL/L (ref 98–107)
CO2: 22 MMOL/L (ref 22–29)
CREAT SERPL-MCNC: 0.9 MG/DL (ref 0.5–1)
EOSINOPHILS ABSOLUTE: 0.24 E9/L (ref 0.05–0.5)
EOSINOPHILS RELATIVE PERCENT: 2.5 % (ref 0–6)
GFR AFRICAN AMERICAN: >60
GFR NON-AFRICAN AMERICAN: 59 ML/MIN/1.73
GLUCOSE BLD-MCNC: 92 MG/DL (ref 74–99)
HBA1C MFR BLD: 6.3 % (ref 4–5.6)
HCT VFR BLD CALC: 42.9 % (ref 34–48)
HEMOGLOBIN: 13.9 G/DL (ref 11.5–15.5)
IMMATURE GRANULOCYTES #: 0.03 E9/L
IMMATURE GRANULOCYTES %: 0.3 % (ref 0–5)
LYMPHOCYTES ABSOLUTE: 2.22 E9/L (ref 1.5–4)
LYMPHOCYTES RELATIVE PERCENT: 23.1 % (ref 20–42)
MCH RBC QN AUTO: 28 PG (ref 26–35)
MCHC RBC AUTO-ENTMCNC: 32.4 % (ref 32–34.5)
MCV RBC AUTO: 86.5 FL (ref 80–99.9)
MONOCYTES ABSOLUTE: 1.07 E9/L (ref 0.1–0.95)
MONOCYTES RELATIVE PERCENT: 11.1 % (ref 2–12)
NEUTROPHILS ABSOLUTE: 6.01 E9/L (ref 1.8–7.3)
NEUTROPHILS RELATIVE PERCENT: 62.4 % (ref 43–80)
PDW BLD-RTO: 15.7 FL (ref 11.5–15)
PLATELET # BLD: 287 E9/L (ref 130–450)
PMV BLD AUTO: 10.4 FL (ref 7–12)
POTASSIUM SERPL-SCNC: 4.2 MMOL/L (ref 3.5–5)
RBC # BLD: 4.96 E12/L (ref 3.5–5.5)
SODIUM BLD-SCNC: 136 MMOL/L (ref 132–146)
TOTAL PROTEIN: 7.4 G/DL (ref 6.4–8.3)
VITAMIN D 25-HYDROXY: 41 NG/ML (ref 30–100)
WBC # BLD: 9.6 E9/L (ref 4.5–11.5)

## 2021-03-03 ENCOUNTER — NURSE ONLY (OUTPATIENT)
Dept: NON INVASIVE DIAGNOSTICS | Age: 86
End: 2021-03-03
Payer: MEDICARE

## 2021-03-03 DIAGNOSIS — Z95.0 CARDIAC PACEMAKER IN SITU: Primary | ICD-10-CM

## 2021-03-03 DIAGNOSIS — Z95.0 PACEMAKER: ICD-10-CM

## 2021-03-03 PROCEDURE — 93296 REM INTERROG EVL PM/IDS: CPT | Performed by: INTERNAL MEDICINE

## 2021-03-03 PROCEDURE — 93294 REM INTERROG EVL PM/LDLS PM: CPT | Performed by: INTERNAL MEDICINE

## 2021-03-03 NOTE — PROGRESS NOTES
See [employer] [place] report. Remote monitoring reviewed over a 90 day period.   End of 90 day monitoring period date of service 03/03/2021

## 2021-06-02 ENCOUNTER — NURSE ONLY (OUTPATIENT)
Dept: NON INVASIVE DIAGNOSTICS | Age: 86
End: 2021-06-02
Payer: MEDICARE

## 2021-06-02 DIAGNOSIS — Z95.0 CARDIAC PACEMAKER IN SITU: Primary | ICD-10-CM

## 2021-06-02 DIAGNOSIS — Z95.0 PACEMAKER: ICD-10-CM

## 2021-06-03 PROCEDURE — 93296 REM INTERROG EVL PM/IDS: CPT | Performed by: INTERNAL MEDICINE

## 2021-06-03 PROCEDURE — 93294 REM INTERROG EVL PM/LDLS PM: CPT | Performed by: INTERNAL MEDICINE

## 2021-06-09 ENCOUNTER — OFFICE VISIT (OUTPATIENT)
Dept: NON INVASIVE DIAGNOSTICS | Age: 86
End: 2021-06-09
Payer: MEDICARE

## 2021-06-09 VITALS
RESPIRATION RATE: 18 BRPM | HEART RATE: 74 BPM | BODY MASS INDEX: 28.43 KG/M2 | SYSTOLIC BLOOD PRESSURE: 132 MMHG | HEIGHT: 61 IN | WEIGHT: 150.6 LBS | DIASTOLIC BLOOD PRESSURE: 68 MMHG

## 2021-06-09 DIAGNOSIS — I48.91 ATRIAL FIBRILLATION, UNSPECIFIED TYPE (HCC): ICD-10-CM

## 2021-06-09 DIAGNOSIS — Z95.0 CARDIAC PACEMAKER IN SITU: Primary | ICD-10-CM

## 2021-06-09 PROCEDURE — 99215 OFFICE O/P EST HI 40 MIN: CPT | Performed by: SPECIALIST

## 2021-06-09 PROCEDURE — 93280 PM DEVICE PROGR EVAL DUAL: CPT | Performed by: SPECIALIST

## 2021-06-09 NOTE — LETTER
Sheila Kee paid her follow up visit to the [facility]) Cardiac Electrophysiology office on 6/9/21. INTERIM SYMPTOMS  1. Angina or equivalent: no  2. Dyspnea: yes - exertional, class II, stable  3. Syncope: no  4. Lightheadedness: yes - occasional, non-orthostatic, does not sound dysrhythmic, stable  5. Palpitation: no  6. Fatigue: yes - moderate, stable    7. Other: yes, episodes of \"shaky and weak,\" stable    SYNOPSIS  1. Physical frailty. 2. Poor metabolic health incurring multiple comorbid conditions. 3. Sinus node somnolence: interim atrial pacing 10% (DDD 60). 4. Atrioventricular conduction appears intact today: past notes range from complete AV block to second-degree AV block. Interim ventricular pacing <1%. 5. Intraventricular conduction is normal.  6. No interim tachyarrhythmia burden. 7. Low QRS voltage. 8. Pacemaker: dual-chamber system, implanted 2012 apparently to address AV block. The right ventricular lead is misbehaving - low R wave amplitude and impedance (both are stable over time). 9. Preserved cardiac mechanical function. 10. CHADS-VASC > 2. Compliant with and tolerant of aspirin 81 QD. THOUGHTS  1. It is not clear to me that she is benefitting from pacing at this point. This will require careful consideration if she remains a candidate for such when her pulse generator reaches its replacement indicator (aout 2 years), and/or if her RV lead fails prior to that time. MY PLAN  1. Continue to oversee pacemaker system.      Jose Llamas MD, [facility]

## 2021-06-09 NOTE — PROGRESS NOTES
Sheila Kee paid her follow up visit to the [facility]) Cardiac Electrophysiology office on 6/9/21. INTERIM SYMPTOMS  1. Angina or equivalent: no  2. Dyspnea: yes - exertional, class II, stable  3. Syncope: no  4. Lightheadedness: yes - occasional, non-orthostatic, does not sound dysrhythmic, stable  5. Palpitation: no  6. Fatigue: yes - moderate, stable    7. Other: yes, episodes of \"shaky and weak,\" stable    SYNOPSIS  1. Physical frailty. 2. Poor metabolic health incurring multiple comorbid conditions. 3. Sinus node somnolence: interim atrial pacing 10% (DDD 60). 4. Atrioventricular conduction appears intact today: past notes range from complete AV block to second-degree AV block. Interim ventricular pacing <1%. 5. Intraventricular conduction is normal.  6. No interim tachyarrhythmia burden. 7. Low QRS voltage. 8. Pacemaker: dual-chamber system, implanted 2012 apparently to address AV block. The right ventricular lead is misbehaving - low R wave amplitude and impedance (both are stable over time). 9. Preserved cardiac mechanical function. 10. CHADS-VASC > 2. Compliant with and tolerant of aspirin 81 QD. THOUGHTS  1. It is not clear to me that she is benefitting from pacing at this point. This will require careful consideration if she remains a candidate for such when her pulse generator reaches its replacement indicator (aout 2 years), and/or if her RV lead fails prior to that time. MY PLAN  1. Continue to oversee pacemaker system. Jose Llamas MD, [facility]      A total of 40 minutes were spent in preparation for the clinic visit, reviewing records/tests, counseling/education of the patient, ordering medications/tests/procedures, coordinating care, and documenting clinical information in the EHR.

## 2021-07-10 ENCOUNTER — APPOINTMENT (OUTPATIENT)
Dept: GENERAL RADIOLOGY | Age: 86
End: 2021-07-10
Payer: MEDICARE

## 2021-07-10 ENCOUNTER — HOSPITAL ENCOUNTER (EMERGENCY)
Age: 86
Discharge: HOME | End: 2021-07-10
Attending: EMERGENCY MEDICINE
Payer: MEDICARE

## 2021-07-10 ENCOUNTER — APPOINTMENT (OUTPATIENT)
Dept: CT IMAGING | Age: 86
End: 2021-07-10
Payer: MEDICARE

## 2021-07-10 VITALS
DIASTOLIC BLOOD PRESSURE: 90 MMHG | SYSTOLIC BLOOD PRESSURE: 106 MMHG | HEART RATE: 74 BPM | OXYGEN SATURATION: 98 % | RESPIRATION RATE: 18 BRPM | TEMPERATURE: 97.9 F

## 2021-07-10 DIAGNOSIS — R07.89 MUSCULOSKELETAL CHEST PAIN: Primary | ICD-10-CM

## 2021-07-10 LAB
ALBUMIN SERPL-MCNC: 4.3 G/DL (ref 3.5–5.2)
ALP BLD-CCNC: 114 U/L (ref 35–104)
ALT SERPL-CCNC: 33 U/L (ref 0–32)
AMORPHOUS: ABNORMAL
ANION GAP SERPL CALCULATED.3IONS-SCNC: 12 MMOL/L (ref 7–16)
AST SERPL-CCNC: 48 U/L (ref 0–31)
BACTERIA: ABNORMAL /HPF
BASOPHILS ABSOLUTE: 0.06 E9/L (ref 0–0.2)
BASOPHILS RELATIVE PERCENT: 0.8 % (ref 0–2)
BILIRUB SERPL-MCNC: 0.6 MG/DL (ref 0–1.2)
BILIRUBIN URINE: NEGATIVE
BLOOD, URINE: ABNORMAL
BUN BLDV-MCNC: 30 MG/DL (ref 6–23)
CALCIUM SERPL-MCNC: 9.4 MG/DL (ref 8.6–10.2)
CHLORIDE BLD-SCNC: 97 MMOL/L (ref 98–107)
CLARITY: ABNORMAL
CO2: 24 MMOL/L (ref 22–29)
COLOR: YELLOW
CREAT SERPL-MCNC: 0.9 MG/DL (ref 0.5–1)
EKG ATRIAL RATE: 60 BPM
EKG P AXIS: -21 DEGREES
EKG P-R INTERVAL: 178 MS
EKG Q-T INTERVAL: 432 MS
EKG QRS DURATION: 66 MS
EKG QTC CALCULATION (BAZETT): 432 MS
EKG R AXIS: 24 DEGREES
EKG T AXIS: 40 DEGREES
EKG VENTRICULAR RATE: 60 BPM
EOSINOPHILS ABSOLUTE: 0.33 E9/L (ref 0.05–0.5)
EOSINOPHILS RELATIVE PERCENT: 4.6 % (ref 0–6)
GFR AFRICAN AMERICAN: >60
GFR NON-AFRICAN AMERICAN: 59 ML/MIN/1.73
GLUCOSE BLD-MCNC: 102 MG/DL (ref 74–99)
GLUCOSE URINE: NEGATIVE MG/DL
HCT VFR BLD CALC: 37.8 % (ref 34–48)
HEMOGLOBIN: 12.2 G/DL (ref 11.5–15.5)
IMMATURE GRANULOCYTES #: 0.02 E9/L
IMMATURE GRANULOCYTES %: 0.3 % (ref 0–5)
KETONES, URINE: NEGATIVE MG/DL
LEUKOCYTE ESTERASE, URINE: ABNORMAL
LYMPHOCYTES ABSOLUTE: 1.83 E9/L (ref 1.5–4)
LYMPHOCYTES RELATIVE PERCENT: 25.5 % (ref 20–42)
MCH RBC QN AUTO: 28.2 PG (ref 26–35)
MCHC RBC AUTO-ENTMCNC: 32.3 % (ref 32–34.5)
MCV RBC AUTO: 87.3 FL (ref 80–99.9)
MONOCYTES ABSOLUTE: 0.97 E9/L (ref 0.1–0.95)
MONOCYTES RELATIVE PERCENT: 13.5 % (ref 2–12)
NEUTROPHILS ABSOLUTE: 3.98 E9/L (ref 1.8–7.3)
NEUTROPHILS RELATIVE PERCENT: 55.3 % (ref 43–80)
NITRITE, URINE: POSITIVE
PDW BLD-RTO: 13.2 FL (ref 11.5–15)
PH UA: 7 (ref 5–9)
PLATELET # BLD: 248 E9/L (ref 130–450)
PMV BLD AUTO: 9.9 FL (ref 7–12)
POTASSIUM REFLEX MAGNESIUM: 4 MMOL/L (ref 3.5–5)
PRO-BNP: 197 PG/ML (ref 0–450)
PROTEIN UA: NEGATIVE MG/DL
RBC # BLD: 4.33 E12/L (ref 3.5–5.5)
RBC UA: ABNORMAL /HPF (ref 0–2)
REASON FOR REJECTION: NORMAL
REJECTED TEST: NORMAL
SODIUM BLD-SCNC: 133 MMOL/L (ref 132–146)
SPECIFIC GRAVITY UA: 1.01 (ref 1–1.03)
TOTAL PROTEIN: 7.7 G/DL (ref 6.4–8.3)
TROPONIN, HIGH SENSITIVITY: 6 NG/L (ref 0–9)
UROBILINOGEN, URINE: 0.2 E.U./DL
WBC # BLD: 7.2 E9/L (ref 4.5–11.5)
WBC UA: ABNORMAL /HPF (ref 0–5)

## 2021-07-10 PROCEDURE — 85025 COMPLETE CBC W/AUTO DIFF WBC: CPT

## 2021-07-10 PROCEDURE — 83880 ASSAY OF NATRIURETIC PEPTIDE: CPT

## 2021-07-10 PROCEDURE — 87088 URINE BACTERIA CULTURE: CPT

## 2021-07-10 PROCEDURE — 96376 TX/PRO/DX INJ SAME DRUG ADON: CPT

## 2021-07-10 PROCEDURE — 93010 ELECTROCARDIOGRAM REPORT: CPT | Performed by: INTERNAL MEDICINE

## 2021-07-10 PROCEDURE — 81001 URINALYSIS AUTO W/SCOPE: CPT

## 2021-07-10 PROCEDURE — 87186 SC STD MICRODIL/AGAR DIL: CPT

## 2021-07-10 PROCEDURE — 96374 THER/PROPH/DIAG INJ IV PUSH: CPT

## 2021-07-10 PROCEDURE — 87077 CULTURE AEROBIC IDENTIFY: CPT

## 2021-07-10 PROCEDURE — 6360000004 HC RX CONTRAST MEDICATION: Performed by: RADIOLOGY

## 2021-07-10 PROCEDURE — 93005 ELECTROCARDIOGRAM TRACING: CPT | Performed by: EMERGENCY MEDICINE

## 2021-07-10 PROCEDURE — 84484 ASSAY OF TROPONIN QUANT: CPT

## 2021-07-10 PROCEDURE — 71275 CT ANGIOGRAPHY CHEST: CPT

## 2021-07-10 PROCEDURE — 96375 TX/PRO/DX INJ NEW DRUG ADDON: CPT

## 2021-07-10 PROCEDURE — 6360000002 HC RX W HCPCS: Performed by: EMERGENCY MEDICINE

## 2021-07-10 PROCEDURE — 99285 EMERGENCY DEPT VISIT HI MDM: CPT

## 2021-07-10 PROCEDURE — 6370000000 HC RX 637 (ALT 250 FOR IP): Performed by: EMERGENCY MEDICINE

## 2021-07-10 PROCEDURE — 80053 COMPREHEN METABOLIC PANEL: CPT

## 2021-07-10 PROCEDURE — 2580000003 HC RX 258: Performed by: RADIOLOGY

## 2021-07-10 PROCEDURE — 71045 X-RAY EXAM CHEST 1 VIEW: CPT

## 2021-07-10 PROCEDURE — 36415 COLL VENOUS BLD VENIPUNCTURE: CPT

## 2021-07-10 RX ORDER — ACETAMINOPHEN 500 MG
500 TABLET ORAL EVERY 6 HOURS PRN
COMMUNITY

## 2021-07-10 RX ORDER — SODIUM CHLORIDE 0.9 % (FLUSH) 0.9 %
10 SYRINGE (ML) INJECTION
Status: COMPLETED | OUTPATIENT
Start: 2021-07-10 | End: 2021-07-10

## 2021-07-10 RX ORDER — ONDANSETRON 2 MG/ML
4 INJECTION INTRAMUSCULAR; INTRAVENOUS ONCE
Status: DISCONTINUED | OUTPATIENT
Start: 2021-07-10 | End: 2021-07-10 | Stop reason: HOSPADM

## 2021-07-10 RX ORDER — VALACYCLOVIR HYDROCHLORIDE 1 G/1
1000 TABLET, FILM COATED ORAL 3 TIMES DAILY
Qty: 21 TABLET | Refills: 0 | Status: SHIPPED | OUTPATIENT
Start: 2021-07-10 | End: 2021-07-17

## 2021-07-10 RX ORDER — ACYCLOVIR 200 MG/1
400 CAPSULE ORAL 3 TIMES DAILY
Status: DISCONTINUED | OUTPATIENT
Start: 2021-07-10 | End: 2021-07-10 | Stop reason: HOSPADM

## 2021-07-10 RX ORDER — ASPIRIN 81 MG/1
324 TABLET, CHEWABLE ORAL ONCE
Status: DISCONTINUED | OUTPATIENT
Start: 2021-07-10 | End: 2021-07-10

## 2021-07-10 RX ORDER — LEVETIRACETAM 10 MG/ML
1000 INJECTION INTRAVASCULAR ONCE
Status: DISCONTINUED | OUTPATIENT
Start: 2021-07-10 | End: 2021-07-10

## 2021-07-10 RX ORDER — SODIUM CHLORIDE 0.9 % (FLUSH) 0.9 %
10 SYRINGE (ML) INJECTION
Status: DISCONTINUED | OUTPATIENT
Start: 2021-07-10 | End: 2021-07-10 | Stop reason: HOSPADM

## 2021-07-10 RX ORDER — FENTANYL CITRATE 50 UG/ML
50 INJECTION, SOLUTION INTRAMUSCULAR; INTRAVENOUS ONCE
Status: COMPLETED | OUTPATIENT
Start: 2021-07-10 | End: 2021-07-10

## 2021-07-10 RX ORDER — NITROGLYCERIN 0.4 MG/1
0.4 TABLET SUBLINGUAL EVERY 5 MIN PRN
Status: DISCONTINUED | OUTPATIENT
Start: 2021-07-10 | End: 2021-07-10 | Stop reason: HOSPADM

## 2021-07-10 RX ORDER — ASPIRIN 81 MG/1
243 TABLET, CHEWABLE ORAL ONCE
Status: COMPLETED | OUTPATIENT
Start: 2021-07-10 | End: 2021-07-10

## 2021-07-10 RX ADMIN — IOPAMIDOL 60 ML: 755 INJECTION, SOLUTION INTRAVENOUS at 18:00

## 2021-07-10 RX ADMIN — FENTANYL CITRATE 50 MCG: 50 INJECTION, SOLUTION INTRAMUSCULAR; INTRAVENOUS at 11:56

## 2021-07-10 RX ADMIN — Medication 10 ML: at 18:01

## 2021-07-10 RX ADMIN — NITROGLYCERIN 0.4 MG: 0.4 TABLET, ORALLY DISINTEGRATING SUBLINGUAL at 13:55

## 2021-07-10 RX ADMIN — HYDROMORPHONE HYDROCHLORIDE 0.5 MG: 1 INJECTION, SOLUTION INTRAMUSCULAR; INTRAVENOUS; SUBCUTANEOUS at 16:09

## 2021-07-10 RX ADMIN — FENTANYL CITRATE 50 MCG: 50 INJECTION, SOLUTION INTRAMUSCULAR; INTRAVENOUS at 14:57

## 2021-07-10 RX ADMIN — ASPIRIN 243 MG: 81 TABLET, CHEWABLE ORAL at 12:29

## 2021-07-10 RX ADMIN — ACYCLOVIR 400 MG: 200 CAPSULE ORAL at 20:24

## 2021-07-10 RX ADMIN — LEVETIRACETAM 1000 MG: 1000 INJECTION, SOLUTION INTRAVENOUS at 11:55

## 2021-07-10 ASSESSMENT — PAIN SCALES - GENERAL
PAINLEVEL_OUTOF10: 10
PAINLEVEL_OUTOF10: 6
PAINLEVEL_OUTOF10: 8
PAINLEVEL_OUTOF10: 8
PAINLEVEL_OUTOF10: 2
PAINLEVEL_OUTOF10: 9
PAINLEVEL_OUTOF10: 9

## 2021-07-10 ASSESSMENT — PAIN DESCRIPTION - PAIN TYPE: TYPE: ACUTE PAIN

## 2021-07-10 NOTE — ED PROVIDER NOTES
medications have been reviewed.     Allergies: Codeine, Amlodipine, Augmentin [amoxicillin-pot clavulanate], Bactrim, Benadryl [diphenhydramine], Brovana [arformoterol], Cardizem [diltiazem hcl], Doxazosin, Ipratropium, Macrodantin [nitrofurantoin macrocrystal], and Verapamil    -------------------------------------------------- RESULTS -------------------------------------------------  All laboratory and radiology results have been personally reviewed by myself   LABS:  Results for orders placed or performed during the hospital encounter of 07/10/21   Comprehensive Metabolic Panel w/ Reflex to MG   Result Value Ref Range    Sodium 133 132 - 146 mmol/L    Potassium reflex Magnesium 4.0 3.5 - 5.0 mmol/L    Chloride 97 (L) 98 - 107 mmol/L    CO2 24 22 - 29 mmol/L    Anion Gap 12 7 - 16 mmol/L    Glucose 102 (H) 74 - 99 mg/dL    BUN 30 (H) 6 - 23 mg/dL    CREATININE 0.9 0.5 - 1.0 mg/dL    GFR Non-African American 59 >=60 mL/min/1.73    GFR African American >60     Calcium 9.4 8.6 - 10.2 mg/dL    Total Protein 7.7 6.4 - 8.3 g/dL    Albumin 4.3 3.5 - 5.2 g/dL    Total Bilirubin 0.6 0.0 - 1.2 mg/dL    Alkaline Phosphatase 114 (H) 35 - 104 U/L    ALT 33 (H) 0 - 32 U/L    AST 48 (H) 0 - 31 U/L   Troponin   Result Value Ref Range    Troponin, High Sensitivity 6 0 - 9 ng/L   Brain Natriuretic Peptide   Result Value Ref Range    Pro- 0 - 450 pg/mL   SPECIMEN REJECTION   Result Value Ref Range    Rejected Test cbcwd     Reason for Rejection see below    CBC WITH AUTO DIFFERENTIAL   Result Value Ref Range    WBC 7.2 4.5 - 11.5 E9/L    RBC 4.33 3.50 - 5.50 E12/L    Hemoglobin 12.2 11.5 - 15.5 g/dL    Hematocrit 37.8 34.0 - 48.0 %    MCV 87.3 80.0 - 99.9 fL    MCH 28.2 26.0 - 35.0 pg    MCHC 32.3 32.0 - 34.5 %    RDW 13.2 11.5 - 15.0 fL    Platelets 317 226 - 109 E9/L    MPV 9.9 7.0 - 12.0 fL    Neutrophils % 55.3 43.0 - 80.0 %    Immature Granulocytes % 0.3 0.0 - 5.0 %    Lymphocytes % 25.5 20.0 - 42.0 %    Monocytes % 13.5 (H) 2.0 - 12.0 %    Eosinophils % 4.6 0.0 - 6.0 %    Basophils % 0.8 0.0 - 2.0 %    Neutrophils Absolute 3.98 1.80 - 7.30 E9/L    Immature Granulocytes # 0.02 E9/L    Lymphocytes Absolute 1.83 1.50 - 4.00 E9/L    Monocytes Absolute 0.97 (H) 0.10 - 0.95 E9/L    Eosinophils Absolute 0.33 0.05 - 0.50 E9/L    Basophils Absolute 0.06 0.00 - 0.20 E9/L   Urinalysis, reflex to microscopic   Result Value Ref Range    Color, UA Yellow Straw/Yellow    Clarity, UA SL CLOUDY Clear    Glucose, Ur Negative Negative mg/dL    Bilirubin Urine Negative Negative    Ketones, Urine Negative Negative mg/dL    Specific Gravity, UA 1.010 1.005 - 1.030    Blood, Urine TRACE-INTACT Negative    pH, UA 7.0 5.0 - 9.0    Protein, UA Negative Negative mg/dL    Urobilinogen, Urine 0.2 <2.0 E.U./dL    Nitrite, Urine POSITIVE (A) Negative    Leukocyte Esterase, Urine MODERATE (A) Negative   Microscopic Urinalysis   Result Value Ref Range    WBC, UA 2-5 0 - 5 /HPF    RBC, UA 1-3 0 - 2 /HPF    Bacteria, UA RARE (A) None Seen /HPF    Amorphous, UA FEW    EKG 12 Lead   Result Value Ref Range    Ventricular Rate 60 BPM    Atrial Rate 60 BPM    P-R Interval 178 ms    QRS Duration 66 ms    Q-T Interval 432 ms    QTc Calculation (Bazett) 432 ms    P Axis -21 degrees    R Axis 24 degrees    T Axis 40 degrees       RADIOLOGY:  Interpreted by Radiologist.  CTA PULMONARY W CONTRAST   Final Result   No central pulmonary embolism or aortic dissection. Centrilobular emphysema with atelectasis in the lung bases. Mild coronary artery calcification. XR CHEST PORTABLE   Final Result   No acute process. ------------------------- NURSING NOTES AND VITALS REVIEWED ---------------------------   The nursing notes within the ED encounter and vital signs as below have been reviewed.    /69   Pulse 62   Temp 97.9 °F (36.6 °C)   Resp 16   SpO2 95%   Oxygen Saturation Interpretation: Normal      ---------------------------------------------------PHYSICAL EXAM--------------------------------------      Constitutional/General: Alert and oriented x3, well appearing, non toxic in NAD  Head: NC/AT  Eyes: PERRL, EOMI  Mouth: Oropharynx clear, handling secretions, no trismus  Neck: Supple, full ROM, no meningeal signs  Pulmonary: Lungs clear to auscultation bilaterally, no wheezes, rales, or rhonchi. Not in respiratory distress  Cardiovascular:  Regular rate and rhythm, no murmurs, gallops, or rubs. 2+ distal pulses  Abdomen: Soft, non tender, non distended,   Extremities: Moves all extremities x 4. Warm and well perfused  Skin: warm and dry without rash left posterior chest wall 5 x 5 cm patch of erythematous indurated skin is noted without any obvious vesicles  Neurologic: GCS 15,  Psych: Normal Affect      ------------------------------ ED COURSE/MEDICAL DECISION MAKING----------------------  Medications   nitroGLYCERIN (NITROSTAT) SL tablet 0.4 mg (0.4 mg Sublingual Given 7/10/21 1355)   ondansetron (ZOFRAN) injection 4 mg (4 mg Intravenous Not Given 7/10/21 1919)   sodium chloride flush 0.9 % injection 10 mL (has no administration in time range)   acyclovir (ZOVIRAX) capsule 400 mg (has no administration in time range)   fentaNYL (SUBLIMAZE) injection 50 mcg (50 mcg Intravenous Given 7/10/21 1156)   aspirin chewable tablet 243 mg (243 mg Oral Given 7/10/21 1229)   fentaNYL (SUBLIMAZE) injection 50 mcg (50 mcg Intravenous Given 7/10/21 1457)   HYDROmorphone (DILAUDID) injection 0.5 mg (0.5 mg Intravenous Given 7/10/21 1609)   iopamidol (ISOVUE-370) 76 % injection 60 mL (60 mLs Intravenous Given 7/10/21 1800)   sodium chloride flush 0.9 % injection 10 mL (10 mLs Intravenous Given 7/10/21 1801)     EKG: This EKG is signed and interpreted by me.     Rate: 60  Rhythm: Paced  Interpretation: paced rhythm  Comparison: stable as compared to patient's most recent EKG    Medical Decision Making:    EKG CT troponin and lab work is unremarkable. Patient was given medication for pain and nausea here in the emergency department. She was given a dose of antiviral medication she was discussed with Dr. Pepper Retana to be discharged home to follow-up with him in 2 days. Patient was comfortable with this plan and declined any medication for pain    Counseling: The emergency provider has spoken with the patient and discussed todays results, in addition to providing specific details for the plan of care and counseling regarding the diagnosis and prognosis. Questions are answered at this time and they are agreeable with the plan.      --------------------------------- IMPRESSION AND DISPOSITION ---------------------------------    IMPRESSION  1.  Musculoskeletal chest pain        DISPOSITION  Disposition: Discharge to home  Patient condition is stable                  Manisha Ludwig MD  07/10/21 4864

## 2021-07-12 LAB
ORGANISM: ABNORMAL
URINE CULTURE, ROUTINE: ABNORMAL

## 2021-07-25 ENCOUNTER — APPOINTMENT (OUTPATIENT)
Dept: GENERAL RADIOLOGY | Age: 86
End: 2021-07-25
Payer: MEDICARE

## 2021-07-25 ENCOUNTER — HOSPITAL ENCOUNTER (EMERGENCY)
Age: 86
Discharge: HOME | End: 2021-07-25
Attending: EMERGENCY MEDICINE
Payer: MEDICARE

## 2021-07-25 VITALS
HEIGHT: 61 IN | DIASTOLIC BLOOD PRESSURE: 81 MMHG | HEART RATE: 81 BPM | BODY MASS INDEX: 28.32 KG/M2 | RESPIRATION RATE: 16 BRPM | OXYGEN SATURATION: 97 % | SYSTOLIC BLOOD PRESSURE: 131 MMHG | WEIGHT: 150 LBS | TEMPERATURE: 97.3 F

## 2021-07-25 DIAGNOSIS — N39.0 URINARY TRACT INFECTION WITH HEMATURIA, SITE UNSPECIFIED: ICD-10-CM

## 2021-07-25 DIAGNOSIS — R31.9 URINARY TRACT INFECTION WITH HEMATURIA, SITE UNSPECIFIED: ICD-10-CM

## 2021-07-25 DIAGNOSIS — B02.9 HERPES ZOSTER WITHOUT COMPLICATION: Primary | ICD-10-CM

## 2021-07-25 DIAGNOSIS — E87.1 HYPONATREMIA: ICD-10-CM

## 2021-07-25 LAB
ALBUMIN SERPL-MCNC: 4 G/DL (ref 3.5–5.2)
ALP BLD-CCNC: 96 U/L (ref 35–104)
ALT SERPL-CCNC: 17 U/L (ref 0–32)
ANION GAP SERPL CALCULATED.3IONS-SCNC: 13 MMOL/L (ref 7–16)
ANION GAP SERPL CALCULATED.3IONS-SCNC: 2 MMOL/L (ref 7–16)
AST SERPL-CCNC: 28 U/L (ref 0–31)
BACTERIA: ABNORMAL /HPF
BASOPHILS ABSOLUTE: 0.06 E9/L (ref 0–0.2)
BASOPHILS RELATIVE PERCENT: 0.8 % (ref 0–2)
BILIRUB SERPL-MCNC: 0.9 MG/DL (ref 0–1.2)
BILIRUBIN URINE: NEGATIVE
BLOOD, URINE: ABNORMAL
BUN BLDV-MCNC: 16 MG/DL (ref 6–23)
BUN BLDV-MCNC: 18 MG/DL (ref 6–23)
CALCIUM SERPL-MCNC: 8.7 MG/DL (ref 8.6–10.2)
CALCIUM SERPL-MCNC: 9.2 MG/DL (ref 8.6–10.2)
CHLORIDE BLD-SCNC: 92 MMOL/L (ref 98–107)
CHLORIDE BLD-SCNC: 98 MMOL/L (ref 98–107)
CLARITY: CLEAR
CO2: 20 MMOL/L (ref 22–29)
CO2: 28 MMOL/L (ref 22–29)
COLOR: YELLOW
CREAT SERPL-MCNC: 0.8 MG/DL (ref 0.5–1)
CREAT SERPL-MCNC: 0.9 MG/DL (ref 0.5–1)
EKG ATRIAL RATE: 85 BPM
EKG P AXIS: 74 DEGREES
EKG P-R INTERVAL: 150 MS
EKG Q-T INTERVAL: 378 MS
EKG QRS DURATION: 56 MS
EKG QTC CALCULATION (BAZETT): 449 MS
EKG R AXIS: 24 DEGREES
EKG T AXIS: 49 DEGREES
EKG VENTRICULAR RATE: 85 BPM
EOSINOPHILS ABSOLUTE: 0.4 E9/L (ref 0.05–0.5)
EOSINOPHILS RELATIVE PERCENT: 5.5 % (ref 0–6)
EPITHELIAL CELLS, UA: ABNORMAL /HPF
GFR AFRICAN AMERICAN: >60
GFR AFRICAN AMERICAN: >60
GFR NON-AFRICAN AMERICAN: 59 ML/MIN/1.73
GFR NON-AFRICAN AMERICAN: >60 ML/MIN/1.73
GLUCOSE BLD-MCNC: 118 MG/DL (ref 74–99)
GLUCOSE BLD-MCNC: 127 MG/DL (ref 74–99)
GLUCOSE URINE: NEGATIVE MG/DL
HCT VFR BLD CALC: 39.7 % (ref 34–48)
HEMOGLOBIN: 13.6 G/DL (ref 11.5–15.5)
IMMATURE GRANULOCYTES #: 0.01 E9/L
IMMATURE GRANULOCYTES %: 0.1 % (ref 0–5)
KETONES, URINE: NEGATIVE MG/DL
LEUKOCYTE ESTERASE, URINE: ABNORMAL
LYMPHOCYTES ABSOLUTE: 1.91 E9/L (ref 1.5–4)
LYMPHOCYTES RELATIVE PERCENT: 26.2 % (ref 20–42)
MCH RBC QN AUTO: 28.8 PG (ref 26–35)
MCHC RBC AUTO-ENTMCNC: 34.3 % (ref 32–34.5)
MCV RBC AUTO: 83.9 FL (ref 80–99.9)
MONOCYTES ABSOLUTE: 1.11 E9/L (ref 0.1–0.95)
MONOCYTES RELATIVE PERCENT: 15.2 % (ref 2–12)
NEUTROPHILS ABSOLUTE: 3.79 E9/L (ref 1.8–7.3)
NEUTROPHILS RELATIVE PERCENT: 52.2 % (ref 43–80)
NITRITE, URINE: POSITIVE
PDW BLD-RTO: 13.3 FL (ref 11.5–15)
PH UA: 7 (ref 5–9)
PLATELET # BLD: 260 E9/L (ref 130–450)
PMV BLD AUTO: 9.9 FL (ref 7–12)
POTASSIUM SERPL-SCNC: 4.3 MMOL/L (ref 3.5–5)
POTASSIUM SERPL-SCNC: 4.4 MMOL/L (ref 3.5–5)
PROTEIN UA: NEGATIVE MG/DL
RBC # BLD: 4.73 E12/L (ref 3.5–5.5)
RBC UA: ABNORMAL /HPF (ref 0–2)
SODIUM BLD-SCNC: 125 MMOL/L (ref 132–146)
SODIUM BLD-SCNC: 128 MMOL/L (ref 132–146)
SPECIFIC GRAVITY UA: 1.01 (ref 1–1.03)
TOTAL PROTEIN: 7 G/DL (ref 6.4–8.3)
TROPONIN, HIGH SENSITIVITY: 8 NG/L (ref 0–9)
TROPONIN, HIGH SENSITIVITY: 8 NG/L (ref 0–9)
UROBILINOGEN, URINE: 0.2 E.U./DL
WBC # BLD: 7.3 E9/L (ref 4.5–11.5)
WBC UA: ABNORMAL /HPF (ref 0–5)

## 2021-07-25 PROCEDURE — 80048 BASIC METABOLIC PNL TOTAL CA: CPT

## 2021-07-25 PROCEDURE — 71045 X-RAY EXAM CHEST 1 VIEW: CPT

## 2021-07-25 PROCEDURE — 96365 THER/PROPH/DIAG IV INF INIT: CPT

## 2021-07-25 PROCEDURE — 6360000002 HC RX W HCPCS: Performed by: NURSE PRACTITIONER

## 2021-07-25 PROCEDURE — 6360000002 HC RX W HCPCS: Performed by: EMERGENCY MEDICINE

## 2021-07-25 PROCEDURE — 81001 URINALYSIS AUTO W/SCOPE: CPT

## 2021-07-25 PROCEDURE — 85025 COMPLETE CBC W/AUTO DIFF WBC: CPT

## 2021-07-25 PROCEDURE — 93005 ELECTROCARDIOGRAM TRACING: CPT | Performed by: NURSE PRACTITIONER

## 2021-07-25 PROCEDURE — 87077 CULTURE AEROBIC IDENTIFY: CPT

## 2021-07-25 PROCEDURE — 36415 COLL VENOUS BLD VENIPUNCTURE: CPT

## 2021-07-25 PROCEDURE — 2580000003 HC RX 258: Performed by: NURSE PRACTITIONER

## 2021-07-25 PROCEDURE — 84484 ASSAY OF TROPONIN QUANT: CPT

## 2021-07-25 PROCEDURE — 99284 EMERGENCY DEPT VISIT MOD MDM: CPT

## 2021-07-25 PROCEDURE — 87186 SC STD MICRODIL/AGAR DIL: CPT

## 2021-07-25 PROCEDURE — 2580000003 HC RX 258: Performed by: EMERGENCY MEDICINE

## 2021-07-25 PROCEDURE — 93010 ELECTROCARDIOGRAM REPORT: CPT | Performed by: INTERNAL MEDICINE

## 2021-07-25 PROCEDURE — 96366 THER/PROPH/DIAG IV INF ADDON: CPT

## 2021-07-25 PROCEDURE — 6370000000 HC RX 637 (ALT 250 FOR IP): Performed by: NURSE PRACTITIONER

## 2021-07-25 PROCEDURE — 96375 TX/PRO/DX INJ NEW DRUG ADDON: CPT

## 2021-07-25 PROCEDURE — 87088 URINE BACTERIA CULTURE: CPT

## 2021-07-25 PROCEDURE — 80053 COMPREHEN METABOLIC PANEL: CPT

## 2021-07-25 RX ORDER — DEXAMETHASONE SODIUM PHOSPHATE 4 MG/ML
4 INJECTION, SOLUTION INTRA-ARTICULAR; INTRALESIONAL; INTRAMUSCULAR; INTRAVENOUS; SOFT TISSUE ONCE
Status: COMPLETED | OUTPATIENT
Start: 2021-07-25 | End: 2021-07-25

## 2021-07-25 RX ORDER — CEFDINIR 300 MG/1
300 CAPSULE ORAL 2 TIMES DAILY
Qty: 20 CAPSULE | Refills: 0 | Status: SHIPPED | OUTPATIENT
Start: 2021-07-25 | End: 2021-08-04

## 2021-07-25 RX ORDER — 0.9 % SODIUM CHLORIDE 0.9 %
500 INTRAVENOUS SOLUTION INTRAVENOUS ONCE
Status: COMPLETED | OUTPATIENT
Start: 2021-07-25 | End: 2021-07-25

## 2021-07-25 RX ORDER — FENTANYL CITRATE 50 UG/ML
50 INJECTION, SOLUTION INTRAMUSCULAR; INTRAVENOUS ONCE
Status: COMPLETED | OUTPATIENT
Start: 2021-07-25 | End: 2021-07-25

## 2021-07-25 RX ORDER — ACYCLOVIR 200 MG/1
400 CAPSULE ORAL ONCE
Status: COMPLETED | OUTPATIENT
Start: 2021-07-25 | End: 2021-07-25

## 2021-07-25 RX ORDER — ACYCLOVIR 800 MG/1
800 TABLET ORAL
Qty: 50 TABLET | Refills: 0 | Status: SHIPPED | OUTPATIENT
Start: 2021-07-25 | End: 2021-08-04

## 2021-07-25 RX ADMIN — FENTANYL CITRATE 50 MCG: 0.05 INJECTION, SOLUTION INTRAMUSCULAR; INTRAVENOUS at 02:00

## 2021-07-25 RX ADMIN — DEXAMETHASONE SODIUM PHOSPHATE 4 MG: 4 INJECTION, SOLUTION INTRAMUSCULAR; INTRAVENOUS at 02:02

## 2021-07-25 RX ADMIN — SODIUM CHLORIDE 500 ML: 9 INJECTION, SOLUTION INTRAVENOUS at 02:00

## 2021-07-25 RX ADMIN — SODIUM CHLORIDE 500 ML: 9 INJECTION, SOLUTION INTRAVENOUS at 03:19

## 2021-07-25 RX ADMIN — HYDROMORPHONE HYDROCHLORIDE 0.5 MG: 1 INJECTION, SOLUTION INTRAMUSCULAR; INTRAVENOUS; SUBCUTANEOUS at 03:19

## 2021-07-25 RX ADMIN — CEFTRIAXONE 1000 MG: 1 INJECTION, POWDER, FOR SOLUTION INTRAMUSCULAR; INTRAVENOUS at 03:23

## 2021-07-25 RX ADMIN — ACYCLOVIR 400 MG: 200 CAPSULE ORAL at 03:19

## 2021-07-25 ASSESSMENT — PAIN DESCRIPTION - LOCATION: LOCATION: RIB CAGE;SHOULDER;BACK

## 2021-07-25 ASSESSMENT — PAIN SCALES - GENERAL
PAINLEVEL_OUTOF10: 9
PAINLEVEL_OUTOF10: 9

## 2021-07-25 ASSESSMENT — PAIN DESCRIPTION - ORIENTATION: ORIENTATION: LEFT

## 2021-07-25 ASSESSMENT — PAIN DESCRIPTION - FREQUENCY: FREQUENCY: CONTINUOUS

## 2021-07-25 ASSESSMENT — PAIN DESCRIPTION - PAIN TYPE: TYPE: ACUTE PAIN

## 2021-07-25 NOTE — ED NOTES
Bed: 17B-17  Expected date:   Expected time:   Means of arrival:   Comments:  Triage Female     Julito Allen RN  07/25/21 1223

## 2021-07-25 NOTE — ED PROVIDER NOTES
ED Physician   HPI:  7/25/21, Time: 1:13 AM EDT         Radha Coates is a 80 y.o. female presenting to the ED for left shoulder blade pain that radiates to the left breast.  Patient was last seen here in July 10 with complaints of chest pain she was subsequently diagnosed as well with shingles and discharged with prescription for Valtrex. Patient reports that she has not been able to take it she only took 2 days worth because it upsets her stomach. She denies any specific vomiting or diarrhea with it. States that she did see a physician on Thursday or Friday. She still does have small vesicles noted to left shoulder blade that radiates to the left outer breast.  She denies any specific shortness of breath denies any abdominal pain reports pain to that specific site. States otherwise normal state of health denies any fevers. No unusual numbness or tingling or paresthesia. Symptoms moderate in severity and persistent. Review of Systems:   A complete review of systems was performed and pertinent positives and negatives are stated within HPI, all other systems reviewed and are negative.          --------------------------------------------- PAST HISTORY ---------------------------------------------  Past Medical History:  has a past medical history of Arthritis, AVB (atrioventricular block), CAD (coronary artery disease), Cancer ([facility] [address] Chronic kidney disease, stage 3 ([facility] [address] Complicated UTI (urinary tract infection), Female bladder prolapse, GERD (gastroesophageal reflux disease), [place] (hard of hearing), Hypertension, Hypokalemia, Psoriasis, and Symptomatic bradycardia. Past Surgical History:  has a past surgical history that includes Cholecystectomy; Kyphosis surgery; Echo Complete (6/20/2012); Hysterectomy; Endoscopy, colon, diagnostic; Colonoscopy; Tonsillectomy; Appendectomy; back surgery; joint replacement; Cardiac catheterization (02/10/2016);  Total hip arthroplasty (Right, 10/15/2019); pacemaker placement (6-); colectomy; and Cystoscopy (N/A, 9/25/2020). Social History:  reports that she quit smoking about 39 years ago. Her smoking use included cigarettes. She started smoking about 56 years ago. She has a 17.00 pack-year smoking history. She has never used smokeless tobacco. She reports that she does not drink alcohol and does not use drugs. Family History: family history includes Asthma in her father; Heart Attack in her mother; Heart Disease in her mother; Other in her father. The patients home medications have been reviewed.     Allergies: Codeine, Amlodipine, Augmentin [amoxicillin-pot clavulanate], Bactrim, Benadryl [diphenhydramine], Brovana [arformoterol], Cardizem [diltiazem hcl], Doxazosin, Ipratropium, Macrodantin [nitrofurantoin macrocrystal], and Verapamil    -------------------------------------------------- RESULTS -------------------------------------------------  All laboratory and radiology results have been personally reviewed by myself   LABS:  Results for orders placed or performed during the hospital encounter of 07/25/21   Troponin   Result Value Ref Range    Troponin, High Sensitivity 8 0 - 9 ng/L   CBC Auto Differential   Result Value Ref Range    WBC 7.3 4.5 - 11.5 E9/L    RBC 4.73 3.50 - 5.50 E12/L    Hemoglobin 13.6 11.5 - 15.5 g/dL    Hematocrit 39.7 34.0 - 48.0 %    MCV 83.9 80.0 - 99.9 fL    MCH 28.8 26.0 - 35.0 pg    MCHC 34.3 32.0 - 34.5 %    RDW 13.3 11.5 - 15.0 fL    Platelets 617 906 - 495 E9/L    MPV 9.9 7.0 - 12.0 fL    Neutrophils % 52.2 43.0 - 80.0 %    Immature Granulocytes % 0.1 0.0 - 5.0 %    Lymphocytes % 26.2 20.0 - 42.0 %    Monocytes % 15.2 (H) 2.0 - 12.0 %    Eosinophils % 5.5 0.0 - 6.0 %    Basophils % 0.8 0.0 - 2.0 %    Neutrophils Absolute 3.79 1.80 - 7.30 E9/L    Immature Granulocytes # 0.01 E9/L    Lymphocytes Absolute 1.91 1.50 - 4.00 E9/L    Monocytes Absolute 1.11 (H) 0.10 - 0.95 E9/L    Eosinophils Absolute 0.40 0.05 - 0.50 E9/L Basophils Absolute 0.06 0.00 - 0.20 E9/L   Comprehensive Metabolic Panel   Result Value Ref Range    Sodium 125 (L) 132 - 146 mmol/L    Potassium 4.4 3.5 - 5.0 mmol/L    Chloride 92 (L) 98 - 107 mmol/L    CO2 20 (L) 22 - 29 mmol/L    Anion Gap 13 7 - 16 mmol/L    Glucose 118 (H) 74 - 99 mg/dL    BUN 18 6 - 23 mg/dL    CREATININE 0.9 0.5 - 1.0 mg/dL    GFR Non-African American 59 >=60 mL/min/1.73    GFR African American >60     Calcium 9.2 8.6 - 10.2 mg/dL    Total Protein 7.0 6.4 - 8.3 g/dL    Albumin 4.0 3.5 - 5.2 g/dL    Total Bilirubin 0.9 0.0 - 1.2 mg/dL    Alkaline Phosphatase 96 35 - 104 U/L    ALT 17 0 - 32 U/L    AST 28 0 - 31 U/L   Urinalysis   Result Value Ref Range    Color, UA Yellow Straw/Yellow    Clarity, UA Clear Clear    Glucose, Ur Negative Negative mg/dL    Bilirubin Urine Negative Negative    Ketones, Urine Negative Negative mg/dL    Specific Gravity, UA 1.010 1.005 - 1.030    Blood, Urine SMALL (A) Negative    pH, UA 7.0 5.0 - 9.0    Protein, UA Negative Negative mg/dL    Urobilinogen, Urine 0.2 <2.0 E.U./dL    Nitrite, Urine POSITIVE (A) Negative    Leukocyte Esterase, Urine LARGE (A) Negative   Microscopic Urinalysis   Result Value Ref Range    WBC, UA 10-20 (A) 0 - 5 /HPF    RBC, UA 0-1 0 - 2 /HPF    Epithelial Cells, UA FEW /HPF    Bacteria, UA MODERATE (A) None Seen /HPF   Basic Metabolic Panel   Result Value Ref Range    Sodium 128 (L) 132 - 146 mmol/L    Potassium 4.3 3.5 - 5.0 mmol/L    Chloride 98 98 - 107 mmol/L    CO2 28 22 - 29 mmol/L    Anion Gap 2 (L) 7 - 16 mmol/L    Glucose 127 (H) 74 - 99 mg/dL    BUN 16 6 - 23 mg/dL    CREATININE 0.8 0.5 - 1.0 mg/dL    GFR Non-African American >60 >=60 mL/min/1.73    GFR African American >60     Calcium 8.7 8.6 - 10.2 mg/dL   Troponin   Result Value Ref Range    Troponin, High Sensitivity 8 0 - 9 ng/L       RADIOLOGY:  Interpreted by Radiologist.  XR CHEST PORTABLE   Final Result   Scattered areas of scarring and/or atelectasis in the lung bases. Other   chronic appearing findings. PA and lateral views would be useful for further   assessment, if symptoms persist.             ------------------------- NURSING NOTES AND VITALS REVIEWED ---------------------------   The nursing notes within the ED encounter and vital signs as below have been reviewed. /81   Pulse 81   Temp 97.3 °F (36.3 °C) (Temporal)   Resp 16   Ht 5' 1\" (1.549 m)   Wt 150 lb (68 kg)   SpO2 97%   BMI 28.34 kg/m²   Oxygen Saturation Interpretation: Normal      ---------------------------------------------------PHYSICAL EXAM--------------------------------------      Constitutional/General: Alert and oriented x3, well appearing, non toxic in NAD  Head: Normocephalic and atraumatic  Eyes: PERRL, EOMI  Mouth: Oropharynx clear, handling secretions, no trismus  Neck: Supple, full ROM,   Pulmonary: Lungs clear to auscultation bilaterally, no wheezes, rales, or rhonchi. Not in respiratory distress  Cardiovascular:  Regular rate and rhythm, no murmurs, gallops, or rubs. 2+ distal pulses  Abdomen: Soft, non tender, non distended, supra pubic cath   Extremities: Moves all extremities x 4. Warm and well perfused  Skin: warm and dry without rash, patient with vesicle formation noted to dermatome T3, areas erythematous with vesicle formation noted.   Neurologic: GCS 15,  Psych: Normal Affect      ------------------------------ ED COURSE/MEDICAL DECISION MAKING----------------------  Medications   0.9 % sodium chloride bolus (0 mLs Intravenous Stopped 7/25/21 0318)   fentaNYL (SUBLIMAZE) injection 50 mcg (50 mcg Intravenous Given 7/25/21 0200)   dexamethasone (DECADRON) injection 4 mg (4 mg Intravenous Given 7/25/21 0202)   0.9 % sodium chloride bolus (500 mLs Intravenous New Bag 7/25/21 0319)   acyclovir (ZOVIRAX) capsule 400 mg (400 mg Oral Given 7/25/21 0319)   HYDROmorphone (DILAUDID) injection 0.5 mg (0.5 mg Intravenous Given 7/25/21 0319)   cefTRIAXone (ROCEPHIN) 1,000 mg in sterile water 10 mL IV syringe (1,000 mg Intravenous New Bag 7/25/21 0323)         ED COURSE:       Medical Decision Making: We will be for labs will rule out cardiac origin versus continued shingles as diagnosed on July 10, 2021. Labs resulted urinalysis with positive nitrates, large amount leuks and 10-20 WBCs will provide patient with antibiotics and send off urine culture, troponin negative, CBC unremarkable, chemistry panel with sodium level low at 125 otherwise unremarkable, chest x-ray unremarkable, per medical records patient does have chronic urinary tract infections being followed by infectious disease and urology, twelve-lead EKG interpreted showed heart rate of 85, normal sinus rhythm with sinus arrhythmia occasional PVCs, no acute ST elevation or depression noted. Normal axis deviation. This was compared to EKG from July 2021. CBC unremarkable, chemistry panel with sodium level low of 125 otherwise all within normal limits, troponin negative. Patient did receive a total of 1 L normal saline will obtain repeat basic metabolic panel and troponin to reevaluate her sodium level which is low at 125. Patient will receive IV Rocephin and urine culture will be sent off, she was remedicated for pain relief. She will also be provided with a dose of acyclovir here as well. Counseling: The emergency provider has spoken with the patient and discussed todays results, in addition to providing specific details for the plan of care and counseling regarding the diagnosis and prognosis. Questions are answered at this time and they are agreeable with the plan.      --------------------------------- IMPRESSION AND DISPOSITION ---------------------------------    IMPRESSION  1. Herpes zoster without complication    2. Urinary tract infection with hematuria, site unspecified    3.  Hyponatremia        DISPOSITION  Disposition: to be discharged  Patient condition is good      NOTE: This report was transcribed using voice recognition software. Every effort was made to ensure accuracy; however, inadvertent computerized transcription errors may be present     Michele Crow, AYAN - CNP  07/25/21 8276    ATTENDING PROVIDER ATTESTATION:     I have personally performed and/or participated in the history, exam, medical decision making, and procedures and agree with all pertinent clinical information. I have also reviewed and agree with the past medical, family and social history unless otherwise noted. My findings/Plan: Patient presenting here because of pain in her shoulder and underneath her breast.  Patient reports pain there for the last several days. Patient reporting no nausea or vomiting reports no abdominal pain. Patient reporting no fever chills or cough. Patient was recently diagnosed with shingles and reportedly was to be on antivirals but had stopped taking them. Patient was recently admitted for diagnosis of chest pain. Patient here is awake alert oriented patient heart and lung exam normal abdomen is soft and nontender patient neurologically intact. She does have pain with movement of her left arm. There is noted vesicular type lesions left posterior upper back into left lateral chest below breast.  Labs and EKG noted reviewed. Patient does have indwelling catheter. Patient afebrile here. Patient medicated and patient given IV fluids. Patient will be observed here will have repeat labs plan will be to discharge if improved. Labs reviewed and noted. Repeat troponin within normal limits and sodium has come up to 128.   Patient rechecked and made aware of findings and plan to discharge     Bao Elise MD  07/25/21 [address], [place]  07/25/21 0223

## 2021-07-25 NOTE — ED NOTES
Upon arrival patient c/o chest, shoulder, back and hip pains. Pt recently dx w/ shingles of back, arm and left side. Pt skin warm/dry and respirations are equal and even bilat. Pt vitals stable. Pt placed on 4 lead monitor w/ SR. Pt updated on plan of care. Pt has no questions or concerns. Pt will continue to be monitored.       Negin Marinelli RN  07/25/21 2701

## 2021-07-25 NOTE — ED NOTES
Pt neighbor [address] phone number was found and he was contacted and will be here in 20 mins to pick her up to take home.       Liudmila Mason RN  07/25/21 0968

## 2021-07-27 LAB
ORGANISM: ABNORMAL
URINE CULTURE, ROUTINE: ABNORMAL

## 2022-08-08 ENCOUNTER — TELEPHONE (OUTPATIENT)
Dept: NON INVASIVE DIAGNOSTICS | Age: 87
End: 2022-08-08

## 2022-08-08 NOTE — TELEPHONE ENCOUNTER
I received a message from ARACELIS questioning when Sari's pacemaker needs changed. I left a message on Brady's voicemail. He is not listed in Sari's chart so I cannot give him any information. If he has further questions he can contact [address] [place] [address], Kenyatta Santacruz.     Ara Dobbins RN, BSN  [facility]

## 2022-08-18 ENCOUNTER — OFFICE VISIT (OUTPATIENT)
Dept: NON INVASIVE DIAGNOSTICS | Age: 87
End: 2022-08-18
Payer: MEDICARE

## 2022-08-18 VITALS
HEIGHT: 61 IN | HEART RATE: 68 BPM | BODY MASS INDEX: 30.32 KG/M2 | WEIGHT: 160.6 LBS | DIASTOLIC BLOOD PRESSURE: 78 MMHG | RESPIRATION RATE: 18 BRPM | SYSTOLIC BLOOD PRESSURE: 112 MMHG

## 2022-08-18 DIAGNOSIS — I47.1 PAROXYSMAL ATRIAL TACHYCARDIA (HCC): ICD-10-CM

## 2022-08-18 DIAGNOSIS — Z95.0 CARDIAC PACEMAKER IN SITU: Primary | ICD-10-CM

## 2022-08-18 DIAGNOSIS — J44.9 CHRONIC OBSTRUCTIVE PULMONARY DISEASE, UNSPECIFIED COPD TYPE (HCC): Chronic | ICD-10-CM

## 2022-08-18 DIAGNOSIS — I10 ESSENTIAL HYPERTENSION: Chronic | ICD-10-CM

## 2022-08-18 DIAGNOSIS — I48.92 PAROXYSMAL ATRIAL FLUTTER (HCC): ICD-10-CM

## 2022-08-18 PROBLEM — I48.91 ATRIAL FIBRILLATION (HCC): Status: RESOLVED | Noted: 2019-01-29 | Resolved: 2022-08-18

## 2022-08-18 LAB
ANION GAP SERPL CALCULATED.3IONS-SCNC: 15 MMOL/L (ref 7–16)
BUN BLDV-MCNC: 25 MG/DL (ref 6–23)
CALCIUM SERPL-MCNC: 9.8 MG/DL (ref 8.6–10.2)
CHLORIDE BLD-SCNC: 103 MMOL/L (ref 98–107)
CO2: 22 MMOL/L (ref 22–29)
CREAT SERPL-MCNC: 1 MG/DL (ref 0.5–1)
GFR AFRICAN AMERICAN: >60
GFR NON-AFRICAN AMERICAN: 52 ML/MIN/1.73
GLUCOSE BLD-MCNC: 92 MG/DL (ref 74–99)
HCT VFR BLD CALC: 48.1 % (ref 34–48)
HEMOGLOBIN: 15.3 G/DL (ref 11.5–15.5)
MAGNESIUM: 2.1 MG/DL (ref 1.6–2.6)
MCH RBC QN AUTO: 28.2 PG (ref 26–35)
MCHC RBC AUTO-ENTMCNC: 31.8 % (ref 32–34.5)
MCV RBC AUTO: 88.6 FL (ref 80–99.9)
PDW BLD-RTO: 13.1 FL (ref 11.5–15)
PLATELET # BLD: 343 E9/L (ref 130–450)
PMV BLD AUTO: 10.7 FL (ref 7–12)
POTASSIUM SERPL-SCNC: 4.7 MMOL/L (ref 3.5–5)
RBC # BLD: 5.43 E12/L (ref 3.5–5.5)
SODIUM BLD-SCNC: 140 MMOL/L (ref 132–146)
WBC # BLD: 14.6 E9/L (ref 4.5–11.5)

## 2022-08-18 PROCEDURE — 93000 ELECTROCARDIOGRAM COMPLETE: CPT | Performed by: INTERNAL MEDICINE

## 2022-08-18 PROCEDURE — 36415 COLL VENOUS BLD VENIPUNCTURE: CPT | Performed by: NURSE PRACTITIONER

## 2022-08-18 PROCEDURE — 99214 OFFICE O/P EST MOD 30 MIN: CPT | Performed by: NURSE PRACTITIONER

## 2022-08-18 PROCEDURE — 1123F ACP DISCUSS/DSCN MKR DOCD: CPT | Performed by: NURSE PRACTITIONER

## 2022-08-18 RX ORDER — VALACYCLOVIR HYDROCHLORIDE 1 G/1
1000 TABLET, FILM COATED ORAL 3 TIMES DAILY
COMMUNITY

## 2022-08-18 NOTE — PROGRESS NOTES
[facility]) Physicians- The Heart and Vascular [address] [place] [address] Electrophysiology  Outpatient Progress Note  Racheal DOBBS Chilango  1935  Date of Service: 8/18/2022  PCP: Brittany Tracey MD  Electrophysiologist:  previously Dr Bernadine Zhou, Dr Dwayne Smiley: Efra Jensen is seen for follow-up and management of: pacemaker     Last seen in the office with Dr. Breonna Ferro on 6/9/2021    PMH as noted below significant for pacemaker in situ, PAT, hypertension, COPD. She feels overall good. On pacemaker check today as noted she had mode switch with atrial flutter for about 20 minutes on August 9, 2022. She does recall that this woke her up in the middle of the night and she felt her heart racing. She was not sure how long this lasted. She denies feeling any other episodes of this. She is not currently on a blood thinner. This was discussed in the past due to her history of PAT and possible future atrial flutter. She is agreeable to this. She denies any falls or bleeding problems. She uses a walker regularly as well as a wheelchair. She denies any swelling or shortness of breath.       Patient Active Problem List   Diagnosis    Pacemaker    Paroxysmal atrial tachycardia (HCC)    Orthostatic hypotension    COPD (chronic obstructive pulmonary disease) (HCC)    Essential hypertension    Sepsis ([facility] [address]    Primary osteoarthritis of right hip    Cystitis    Respiratory distress    Neurogenic bladder    Hypotension       Current Outpatient Medications   Medication Sig Dispense Refill    raNITIdine HCl (ZANTAC 75 PO) Take by mouth as needed      valACYclovir (VALTREX) 1 g tablet Take 1,000 mg by mouth 3 times daily      Menthol, Topical Analgesic, (BIOFREEZE EX) Apply topically as needed      apixaban (ELIQUIS) 5 MG TABS tablet Take 1 tablet by mouth 2 times daily 60 tablet 0    acetaminophen (TYLENOL) 500 MG tablet Take 500 mg by mouth every 6 hours as needed for Pain      carboxymethylcellulose 1 % ophthalmic solution Place 1 drop into both eyes daily as needed for Dry Eyes      tetrahydrozoline 0.05 % ophthalmic solution Place 1 drop into both eyes daily as needed      aspirin 81 MG EC tablet Take 1 tablet by mouth 2 times daily 30 tablet 3    irbesartan (AVAPRO) 150 MG tablet Take 150 mg by mouth every evening   1    triamterene-hydrochlorothiazide (MAXZIDE-25) 37.5-25 MG per tablet Take 1 tablet by mouth every morning   0    sodium chloride (OCEAN, BABY AYR) 0.65 % nasal spray 1 spray by Nasal route as needed for Congestion      Cholecalciferol (VITAMIN D) 50 MCG (2000 UT) TABS tablet Take 2,000 Units by mouth daily        Current Facility-Administered Medications   Medication Dose Route Frequency Provider Last Rate Last Admin    perflutren lipid microspheres (DEFINITY) injection 1.65 mg  1.5 mL IntraVENous ONCE PRN Adrienne Boy, APRN - CNP            Allergies   Allergen Reactions    Codeine Other (See Comments)     headache    Amlodipine Nausea And Vomiting    Augmentin [Amoxicillin-Pot Clavulanate] Nausea And Vomiting    Bactrim Nausea And Vomiting    Benadryl [Diphenhydramine] Nausea And Vomiting    Brovana [Arformoterol] Other (See Comments)     Exacerbates problems      Cardizem [Diltiazem Hcl] Other (See Comments)     Turned her into zombie    Doxazosin     Ipratropium Other (See Comments)     Exacerbates problems      Macrodantin [Nitrofurantoin Macrocrystal] Nausea And Vomiting    Verapamil Other (See Comments)     Turns her into \"zombie\"         ROS:   Constitutional: Negative for fever, activity change and appetite change. HENT: Negative for epistaxis. Eyes: Negative for diploplia, blurred vision. Respiratory: Negative for cough, chest tightness, shortness of breath and wheezing. Cardiovascular: pertinent positives in HPI  Gastrointestinal: Negative for abdominal pain and blood in stool.    All other review of systems are negative     PHYSICAL EXAM:      Vitals:    08/18/22 0911   BP: 112/78 Pulse: 68   Resp: 18   Weight: 160 lb 9.6 oz (72.8 kg)   Height: 5' 1\" (1.549 m)     Constitutional: well-developed, no acute distress  Eyes: conjunctivae normal, no xanthelasma   Ears, Nose, Throat: oral mucosa moist, no cyanosis   CV: no JVD. Regular rate and rhythm. Normal S1S2 and no S3. No murmurs, rubs, or gallops. PMI is nondisplaced  Lungs: clear to auscultation bilaterally, normal respiratory effort without used of accessory muscles  Abdomen: soft, non-tender, bowel sounds present, no masses or hepatomegaly   Musculoskeletal: no digital clubbing, no edema   Skin: warm, no rashes ; dry flaky skin on legs    Data:    No results for input(s): WBC, HGB, HCT, PLT in the last 72 hours. No results for input(s): NA, K, CL, CO2, BUN, CREATININE, GLU, CALCIUM in the last 72 hours. Invalid input(s):  MAGNESIUM  Lab Results   Component Value Date/Time    MG 1.7 01/29/2019 04:25 AM     No results for input(s): TSH in the last 72 hours. No results for input(s): INR in the last 72 hours. EKG: Sinus rhythm rate of 68   please see scan in Cardiology.     Device Interrogation: [employer] Odin Pacemaker   Underlying rhythm: Presenting rhythm a sensed V sensed at 74 bpm  Mode: DDDR   Battery Voltage/Longevity: 0.63 V, less than 3 months  Pacing: A: 1.3%  RV: Less than 1%    P wave: 2.9 mV  Impedance: 390 ohms   Threshold: 0.5 V @0.5 ms  RV R wave: 3.4 mV  Impedance: 130 ohms   Threshold: 2 V @0.5 ms  Episodes: Less than 1% AF burden, 9 mode switches, longest about 20 minutes total  8 high ventricular rate alerts, rates 153 appear one-to-one atrially driven  Reprogramming included: Rate response turned on  Overall device function is normal    All device programmable settings were evaluated per above and in the scanned document, along with iterative adjustments (capture thresholds) to assess and select the most appropriate final programming to provide for consistent delivery of the appropriate therapy and to verify function of the device. Echocardiogram: 1/28/2019  Summary   Ejection fraction is visually estimated at 60%. No regional wall motion abnormalities seen. Normal right ventricle structure and function. Physiologic and/or trace mitral regurgitation is present. Assessment and plan:    1. Dual chamber [employer] Odin pacemaker  -  implantation 6/20/12  - Type ll AV block; hx of near syncope      2. PAT/diagnosis of paroxysmal atrial flutter  - Intolerant to calcium channel blockers  - Avoided BB due to severe COPD in the past; but tolerating daily selective BB Toprol XL 25 mg QD  -Episode of atrial flutter with mode switch on device check today, lasted about 20 minutes. Patient was symptomatic. She is agreeable to start blood thinner for atrial flutter  -[id number] score of at least 4 (hypertension, age, female)     3. H/O orthostatic hypotension     4. HTN  -Stable on Maxide     5. COPD  - Nebulizers and inhalers as noted above  - No recent exacerbations per patient     Plan:      1. Continue Toprol XL 25 mg daily to aid in arrhythmia suppression. 2.  BMP and CBC today in the office  3. After labs received, start Eliquis 5 mg twice daily for stroke prevention and atrial flutter; plan to stop aspirin  4. Remote monitoring every 31 days related to low battery on pacemaker  5. Schedule generator change accordingly to remote monitoring  6. Will have to hold Eliquis prior to generator change    Re-education on importance of well controlled HTN (goal BP < 130/80), adequate weight control (goal BMI of < 27), physical activity consisting of moderate cardiopulmonary exercise up to a goal of 250 min/wk, smoking/tobacco abstinence and limited ETOH intake. I have spent a total of 35 minutes with the patient and the family reviewing the above stated recommendations. And a total of >50% of that time involved face-to-face time providing counseling and or coordination of care with the other providers.     Thank you for allowing me to participate in your patient's care. Please call me if there are any questions or concerns.       Candance Caulk, APRN - CNP  Cardiac Electrophysiology  [facility]) Physicians  The Heart and Vascular [place]: [facility]  9:53 AM  8/18/2022

## 2022-08-18 NOTE — PROGRESS NOTES
Lab work drawn today from left arm. Patient tolerated procedure well.     Gerber Atkinson, [address] [facility]

## 2022-08-18 NOTE — PATIENT INSTRUCTIONS
Labs work today in the office, we will call you and let you know to start ELiquis 5 mg twice a day after the blood work is resulted. Please have echocardiogram scheduled     We will review your pacemaker checks every month and call when needs battery changed.

## 2022-08-22 ENCOUNTER — TELEPHONE (OUTPATIENT)
Dept: NON INVASIVE DIAGNOSTICS | Age: 87
End: 2022-08-22

## 2022-08-22 NOTE — TELEPHONE ENCOUNTER
Spoke with patient's son let him know lab results and recommendations for starting Eliquis. He verbalized understanding.

## 2022-08-22 NOTE — TELEPHONE ENCOUNTER
----- Message from AYAN Rios CNP sent at 8/22/2022  3:17 PM EDT -----  Yes, I reviewed her labs today and they look good. Please tell her to stat the blood thinner Eliquis as we discussed. I appreciate it. Dinora Ward   ----- Message -----  From: Ana Jansen [id number]  Sent: 8/22/2022  11:24 AM EDT  To: AYAN Rios CNP    Patient's son called questioning if OK to start [address] [place] [address].  Please advise, thank you

## 2022-08-25 ENCOUNTER — TELEPHONE (OUTPATIENT)
Dept: NON INVASIVE DIAGNOSTICS | Age: 87
End: 2022-08-25

## 2022-08-25 NOTE — TELEPHONE ENCOUNTER
Samples of this drug were given to the patient, quantity 2 boxes, Lot Number [id number] expires 07/24    The medication is being monitored by Ellison Apley (former DS pt).

## 2022-09-02 ENCOUNTER — TELEPHONE (OUTPATIENT)
Dept: NON INVASIVE DIAGNOSTICS | Age: 87
End: 2022-09-02

## 2022-09-02 NOTE — TELEPHONE ENCOUNTER
I returned Brady's call. He wanted to know when echo and battery change out on his mother's pacemaker will be. I informed Allegra Monique his mother's pacemaker has not triggered ANDREAS yet. We cannot schedule the device change out until her present device triggers ANDREAS. Once the device does trigger ANDREAS, there is a three month window to change out. I reassured Allegra Monique we do not wait for three months to get the echo and change out the device. He voiced understanding.     Rah Cuevas RN, BSN  [facility]

## 2022-09-23 ENCOUNTER — TELEPHONE (OUTPATIENT)
Dept: CARDIOLOGY | Age: 87
End: 2022-09-23

## 2022-09-26 ENCOUNTER — HOSPITAL ENCOUNTER (OUTPATIENT)
Dept: CARDIOLOGY | Age: 87
Discharge: HOME | End: 2022-09-26
Payer: MEDICARE

## 2022-09-26 DIAGNOSIS — Z95.0 CARDIAC PACEMAKER IN SITU: ICD-10-CM

## 2022-09-26 LAB
LV EF: 60 %
LVEF MODALITY: NORMAL

## 2022-09-26 PROCEDURE — 93306 TTE W/DOPPLER COMPLETE: CPT | Performed by: PSYCHIATRY & NEUROLOGY

## 2022-09-28 ENCOUNTER — TELEPHONE (OUTPATIENT)
Dept: NON INVASIVE DIAGNOSTICS | Age: 87
End: 2022-09-28

## 2022-09-28 NOTE — TELEPHONE ENCOUNTER
I spoke with patient's son, Irene Womack. I asked him to send a remote Merlin transmission tomorrow morning. Her St Odin pacemaker is nearing ANDREAS. I changed her remote monitoring to monthly to check battery status. Irene Womack will send a remote tomorrow morning.     Becky Simmons RN, BSN  [facility]

## 2022-09-30 ENCOUNTER — TELEPHONE (OUTPATIENT)
Dept: NON INVASIVE DIAGNOSTICS | Age: 87
End: 2022-09-30

## 2022-09-30 NOTE — TELEPHONE ENCOUNTER
Called [place], [facility], informed him that device transmission was received and battery has reached ANDREAS. Will forward to the schedulers.

## 2022-11-14 ENCOUNTER — ANESTHESIA (OUTPATIENT)
Dept: CARDIAC CATH/INVASIVE PROCEDURES | Age: 87
End: 2022-11-14

## 2022-11-14 ENCOUNTER — ANESTHESIA EVENT (OUTPATIENT)
Dept: CARDIAC CATH/INVASIVE PROCEDURES | Age: 87
End: 2022-11-14

## 2022-11-14 ENCOUNTER — HOSPITAL ENCOUNTER (OUTPATIENT)
Dept: GENERAL RADIOLOGY | Age: 87
Discharge: HOME | End: 2022-11-16
Payer: MEDICARE

## 2022-11-14 ENCOUNTER — HOSPITAL ENCOUNTER (OUTPATIENT)
Dept: CARDIAC CATH/INVASIVE PROCEDURES | Age: 87
Discharge: HOME | End: 2022-11-14
Payer: MEDICARE

## 2022-11-14 VITALS
HEART RATE: 75 BPM | TEMPERATURE: 98.1 F | HEIGHT: 61 IN | OXYGEN SATURATION: 98 % | DIASTOLIC BLOOD PRESSURE: 73 MMHG | WEIGHT: 150 LBS | BODY MASS INDEX: 28.32 KG/M2 | RESPIRATION RATE: 14 BRPM | SYSTOLIC BLOOD PRESSURE: 139 MMHG

## 2022-11-14 PROBLEM — Z45.010 ELECTIVE REPLACEMENT INDICATED FOR CARDIAC PACEMAKER BATTERY AT END OF LIFESPAN: Status: ACTIVE | Noted: 2022-11-14

## 2022-11-14 LAB
ANION GAP SERPL CALCULATED.3IONS-SCNC: 17 MMOL/L (ref 7–16)
BASOPHILS ABSOLUTE: 0.14 E9/L (ref 0–0.2)
BASOPHILS RELATIVE PERCENT: 0.9 % (ref 0–2)
BUN BLDV-MCNC: 24 MG/DL (ref 6–23)
CALCIUM SERPL-MCNC: 10.2 MG/DL (ref 8.6–10.2)
CHLORIDE BLD-SCNC: 101 MMOL/L (ref 98–107)
CO2: 20 MMOL/L (ref 22–29)
CREAT SERPL-MCNC: 0.9 MG/DL (ref 0.5–1)
EOSINOPHILS ABSOLUTE: 0.84 E9/L (ref 0.05–0.5)
EOSINOPHILS RELATIVE PERCENT: 5.2 % (ref 0–6)
GFR SERPL CREATININE-BSD FRML MDRD: >60 ML/MIN/1.73
GLUCOSE BLD-MCNC: 120 MG/DL (ref 74–99)
HCT VFR BLD CALC: 45.8 % (ref 34–48)
HEMOGLOBIN: 15.4 G/DL (ref 11.5–15.5)
LYMPHOCYTES ABSOLUTE: 2.42 E9/L (ref 1.5–4)
LYMPHOCYTES RELATIVE PERCENT: 14.8 % (ref 20–42)
MAGNESIUM: 1.9 MG/DL (ref 1.6–2.6)
MCH RBC QN AUTO: 29.6 PG (ref 26–35)
MCHC RBC AUTO-ENTMCNC: 33.6 % (ref 32–34.5)
MCV RBC AUTO: 88.1 FL (ref 80–99.9)
MONOCYTES ABSOLUTE: 1.61 E9/L (ref 0.1–0.95)
MONOCYTES RELATIVE PERCENT: 10.4 % (ref 2–12)
NEUTROPHILS ABSOLUTE: 11.11 E9/L (ref 1.8–7.3)
NEUTROPHILS RELATIVE PERCENT: 68.7 % (ref 43–80)
OVALOCYTES: ABNORMAL
PDW BLD-RTO: 13.4 FL (ref 11.5–15)
PLATELET # BLD: 365 E9/L (ref 130–450)
PMV BLD AUTO: 10.5 FL (ref 7–12)
POIKILOCYTES: ABNORMAL
POTASSIUM SERPL-SCNC: 4 MMOL/L (ref 3.5–5)
RBC # BLD: 5.2 E12/L (ref 3.5–5.5)
SODIUM BLD-SCNC: 138 MMOL/L (ref 132–146)
WBC # BLD: 16.1 E9/L (ref 4.5–11.5)

## 2022-11-14 PROCEDURE — 83735 ASSAY OF MAGNESIUM: CPT

## 2022-11-14 PROCEDURE — 71045 X-RAY EXAM CHEST 1 VIEW: CPT

## 2022-11-14 PROCEDURE — 36415 COLL VENOUS BLD VENIPUNCTURE: CPT

## 2022-11-14 PROCEDURE — 2500000003 HC RX 250 WO HCPCS

## 2022-11-14 PROCEDURE — 2580000003 HC RX 258

## 2022-11-14 PROCEDURE — 6360000002 HC RX W HCPCS

## 2022-11-14 PROCEDURE — 3700000001 HC ADD 15 MINUTES (ANESTHESIA)

## 2022-11-14 PROCEDURE — 2709999900 HC NON-CHARGEABLE SUPPLY

## 2022-11-14 PROCEDURE — 2720000010 HC SURG SUPPLY STERILE

## 2022-11-14 PROCEDURE — 3700000000 HC ANESTHESIA ATTENDED CARE

## 2022-11-14 PROCEDURE — 80048 BASIC METABOLIC PNL TOTAL CA: CPT

## 2022-11-14 PROCEDURE — 33228 REMV&REPLC PM GEN DUAL LEAD: CPT | Performed by: INTERNAL MEDICINE

## 2022-11-14 PROCEDURE — 99205 OFFICE O/P NEW HI 60 MIN: CPT | Performed by: INTERNAL MEDICINE

## 2022-11-14 PROCEDURE — C1889 IMPLANT/INSERT DEVICE, NOC: HCPCS

## 2022-11-14 PROCEDURE — C1785 PMKR, DUAL, RATE-RESP: HCPCS

## 2022-11-14 PROCEDURE — 85025 COMPLETE CBC W/AUTO DIFF WBC: CPT

## 2022-11-14 PROCEDURE — 33228 REMV&REPLC PM GEN DUAL LEAD: CPT

## 2022-11-14 RX ORDER — DOXYCYCLINE HYCLATE 100 MG
100 TABLET ORAL 2 TIMES DAILY
Qty: 10 TABLET | Refills: 0 | Status: SHIPPED | OUTPATIENT
Start: 2022-11-14 | End: 2022-11-19

## 2022-11-14 RX ORDER — VANCOMYCIN HYDROCHLORIDE 1 G/20ML
INJECTION, POWDER, LYOPHILIZED, FOR SOLUTION INTRAVENOUS PRN
Status: DISCONTINUED | OUTPATIENT
Start: 2022-11-14 | End: 2022-11-14 | Stop reason: SDUPTHER

## 2022-11-14 RX ORDER — SODIUM CHLORIDE 0.9 % (FLUSH) 0.9 %
5-40 SYRINGE (ML) INJECTION EVERY 12 HOURS SCHEDULED
Status: DISCONTINUED | OUTPATIENT
Start: 2022-11-14 | End: 2022-11-15 | Stop reason: HOSPADM

## 2022-11-14 RX ORDER — SODIUM CHLORIDE 0.9 % (FLUSH) 0.9 %
5-40 SYRINGE (ML) INJECTION PRN
Status: DISCONTINUED | OUTPATIENT
Start: 2022-11-14 | End: 2022-11-15 | Stop reason: HOSPADM

## 2022-11-14 RX ORDER — PROPOFOL 10 MG/ML
INJECTION, EMULSION INTRAVENOUS CONTINUOUS PRN
Status: DISCONTINUED | OUTPATIENT
Start: 2022-11-14 | End: 2022-11-14 | Stop reason: SDUPTHER

## 2022-11-14 RX ORDER — FENTANYL CITRATE 50 UG/ML
INJECTION, SOLUTION INTRAMUSCULAR; INTRAVENOUS PRN
Status: DISCONTINUED | OUTPATIENT
Start: 2022-11-14 | End: 2022-11-14 | Stop reason: SDUPTHER

## 2022-11-14 RX ORDER — PHENYLEPHRINE HCL IN 0.9% NACL 1 MG/10 ML
SYRINGE (ML) INTRAVENOUS PRN
Status: DISCONTINUED | OUTPATIENT
Start: 2022-11-14 | End: 2022-11-14 | Stop reason: SDUPTHER

## 2022-11-14 RX ORDER — MIDAZOLAM HYDROCHLORIDE 1 MG/ML
INJECTION INTRAMUSCULAR; INTRAVENOUS PRN
Status: DISCONTINUED | OUTPATIENT
Start: 2022-11-14 | End: 2022-11-14 | Stop reason: SDUPTHER

## 2022-11-14 RX ORDER — SODIUM CHLORIDE 9 MG/ML
INJECTION, SOLUTION INTRAVENOUS PRN
Status: DISCONTINUED | OUTPATIENT
Start: 2022-11-14 | End: 2022-11-15 | Stop reason: HOSPADM

## 2022-11-14 RX ORDER — SODIUM CHLORIDE 9 MG/ML
INJECTION, SOLUTION INTRAVENOUS CONTINUOUS PRN
Status: DISCONTINUED | OUTPATIENT
Start: 2022-11-14 | End: 2022-11-14 | Stop reason: SDUPTHER

## 2022-11-14 RX ADMIN — SODIUM CHLORIDE: 9 INJECTION, SOLUTION INTRAVENOUS at 10:54

## 2022-11-14 RX ADMIN — VANCOMYCIN HYDROCHLORIDE 1000 MG: 1 INJECTION, POWDER, LYOPHILIZED, FOR SOLUTION INTRAVENOUS at 11:09

## 2022-11-14 RX ADMIN — Medication 100 MCG: at 11:29

## 2022-11-14 RX ADMIN — MIDAZOLAM 1 MG: 1 INJECTION INTRAMUSCULAR; INTRAVENOUS at 11:02

## 2022-11-14 RX ADMIN — PROPOFOL 20 MCG/KG/MIN: 10 INJECTION, EMULSION INTRAVENOUS at 11:08

## 2022-11-14 RX ADMIN — FENTANYL CITRATE 50 MCG: 50 INJECTION, SOLUTION INTRAMUSCULAR; INTRAVENOUS at 11:02

## 2022-11-14 ASSESSMENT — COPD QUESTIONNAIRES: CAT_SEVERITY: MILD

## 2022-11-14 NOTE — OP NOTE
[address] [facility] Electrophysiology  Procedure Report  PATIENT: Quintin Ch  MEDICAL RECORD NUMBER: [id number]  DATE OF PROCEDURE:  11/14/2022  ATTENDING ELECTROPHYSIOLOGIST:  [address], [place]  REFERRING PHYSICIAN: Dr. Vanessa Aquino    Procedure Performed:  1. Generator Replacement of a  Dual Chamber Permanent Pacemaker (Abbott)    Indication for Procedure:  1. Pacemaker pulse generator at elective replacement interval    Flouroscopy: 0 min  Complications: none immediately apparent  EBL: minimal  Specimens: none  Contrast: 0 cc    FINDINGS:  Implanted device information:  1. New Pulse Generator is an Abbott. Serial # [id number]  Placement: Left pectoral subcutaneous  Date of implant: 11/14/2022  2. Old Pulse Generator is an Abbott. Serial # [id number]  Placement: Left pectoral subcutaneous  Date of explant: 11/14/2022  Date of implant: 6/20/2012  3. Right atrial lead parameters are as follows:  Abbott  Model # [id number]. Serial # [id number]  Lead position: RA  Date of implant: 6/20/2012  P-waves: 3.4 mV  Pacing threshold: 0.5 V at 0.5 ms. Impedance: 460 ohms. 4. Right ventricular lead parameters are as follows:  Abbott  Model 2088TC-52. Serial # [id number]  Lead position: RV  Date of implant: 6/20/2012  R-waves: 3.1 mV  Pacing threshold: 1.75 V at 0.5 ms. Impedance: 160 ohms. 5. Bradycardia parameters:  Mode: DDDR  Base Rate: 60  Max Tracking Rate: 120    DETAILS OF THE OPERATION: The risks, benefits, alternatives of the procedure were explained to patient and family. They consented and agreed to proceed. Written consent was obtained in the chart. Blood products are not routinely needed for such procedures. The patient was brought to the Electrophysiology lab in a fasting and non-sedated state. The patient had electrocardiographic and hemodynamic monitoring equipment placed.  During the case the patient was under the care of an anesthesiologist/CRNA team for sedation and hemodynamic monitoring. A final time out was performed prior to beginning the procedure. The patient was prepped and draped in usual sterile fashion. Twenty mL of 2% lidocaine was used to anesthetize the left pectoral area. Using a #10 blade an incision was made over the patient's previous incision. Using combination of scalpel and plasma-blade, we dissected down to the device pocket. The device and was then removed from the pocket and the leads were disconnected from the header of the device. The leads were visually inspected and lead parameters were checked and deemed to adequate. The patient's previous device pocket was then extended inferiorly and medially to approximate the size of the new device. Hemostasis was achieved with electrocautery and confirmed visually. The device pocket was then irrigated with antibiotic vancomycin solution. The leads were then attached to the appropriate binding posts on the header of the device. The set screws were then tightened with a torque wrench. Tug tests were performed on all leads to insure they are adequately fixated. The device and the leads were then placed within the device pocket inside of a Tyrx absorbable anti-microbial pouch. Lead parameters were then rechecked via the device and deemed to be adequate. Surgiflo was injected into the pocket to ensure hemostasis. The incision was closed with 3 layers of absorbable suture. The deepest of which was a 2-0 interrupted stitches followed by a 2nd layer of 3-0 running stitch performed perpendicular to the deep layer and, lastly, a 4-0 subcuticular stitch. The skin was then prepped with benzoin solution, Steri-Strips, and island Aquacel dressing were then applied. At the end of the case, the patient was hemodynamically stable and under the care of an anesthesiologist, the patient was brought back to the recovery area for post procedural monitoring. ASSESSMENT:  1.  Successful generator change of an Abbott dual chamber permanent pacemaker for elective replacement indicator     RECOMMENDATIONS:  1. Post-procedural monitoring in the recovery area  2. EKG and portable chest x-ray post procedure  3. The patient may be discharged to home when hemodynamically stable, awake, tolerating oral intake, ambulating and has adequate pain control  4. The patient will receive a 5-day course of oral antibiotics. 5. The patient is to follow-up in device clinic within 2 weeks upon discharge  6. The patient is advised follow all post-operative device and wound care instructions as per the information provided in the pre-operative visit and discharge instructions.     Kathy Kumari MD  Cardiac Electrophysiology  [facility]  The Heart and Vascular [place]: [facility]  11:03 AM  11/14/2022

## 2022-11-14 NOTE — ANESTHESIA PRE PROCEDURE
Department of Anesthesiology  Preprocedure Note       Name:  Keitha Spatz   Age:  80 y.o.  :  1935                                          MRN:  [id number]         Date:  2022      Surgeon: * Surgery not found *    Procedure:     Medications prior to admission:   Prior to Admission medications    Medication Sig Start Date End Date Taking?  Authorizing Provider   raNITIdine HCl (ZANTAC 75 PO) Take by mouth as needed  Patient not taking: Reported on 2022    Historical Provider, MD   Menthol, Topical Analgesic, (BIOFREEZE EX) Apply topically as needed    Historical Provider, MD   apixaban (ELIQUIS) 5 MG TABS tablet Take 1 tablet by mouth 2 times daily 22   AYAN Bell CNP   acetaminophen (TYLENOL) 500 MG tablet Take 500 mg by mouth every 6 hours as needed for Pain    Historical Provider, MD   carboxymethylcellulose 1 % ophthalmic solution Place 1 drop into both eyes daily as needed for Dry Eyes    Historical Provider, MD   tetrahydrozoline 0.05 % ophthalmic solution Place 1 drop into both eyes daily as needed    Historical Provider, MD   irbesartan (AVAPRO) 150 MG tablet Take 150 mg by mouth every evening  19   Historical Provider, MD   triamterene-hydrochlorothiazide (MAXZIDE-25) 37.5-25 MG per tablet Take 1 tablet by mouth every morning  18   Historical Provider, MD   sodium chloride (OCEAN, BABY AYR) 0.65 % nasal spray 1 spray by Nasal route as needed for Congestion    Historical Provider, MD   Cholecalciferol (VITAMIN D) 50 MCG (2000 UT) TABS tablet Take 2,000 Units by mouth daily     Historical Provider, MD       Current medications:    Current Outpatient Medications   Medication Sig Dispense Refill    raNITIdine HCl (ZANTAC 75 PO) Take by mouth as needed (Patient not taking: Reported on 2022)      Menthol, Topical Analgesic, (BIOFREEZE EX) Apply topically as needed      apixaban (ELIQUIS) 5 MG TABS tablet Take 1 tablet by mouth 2 times daily 60 tablet 0    acetaminophen (TYLENOL) 500 MG tablet Take 500 mg by mouth every 6 hours as needed for Pain      carboxymethylcellulose 1 % ophthalmic solution Place 1 drop into both eyes daily as needed for Dry Eyes      tetrahydrozoline 0.05 % ophthalmic solution Place 1 drop into both eyes daily as needed      irbesartan (AVAPRO) 150 MG tablet Take 150 mg by mouth every evening   1    triamterene-hydrochlorothiazide (MAXZIDE-25) 37.5-25 MG per tablet Take 1 tablet by mouth every morning   0    sodium chloride (OCEAN, BABY AYR) 0.65 % nasal spray 1 spray by Nasal route as needed for Congestion      Cholecalciferol (VITAMIN D) 50 MCG (2000 UT) TABS tablet Take 2,000 Units by mouth daily        Current Facility-Administered Medications   Medication Dose Route Frequency Provider Last Rate Last Admin    perflutren lipid microspheres (DEFINITY) injection 1.65 mg  1.5 mL IntraVENous ONCE PRN AYAN Oneill - CNP           Allergies:     Allergies   Allergen Reactions    Codeine Other (See Comments)     headache    Amlodipine Nausea And Vomiting    Augmentin [Amoxicillin-Pot Clavulanate] Nausea And Vomiting    Bactrim Nausea And Vomiting    Benadryl [Diphenhydramine] Nausea And Vomiting    Brovana [Arformoterol] Other (See Comments)     Exacerbates problems      Cardizem [Diltiazem Hcl] Other (See Comments)     Turned her into zombie    Doxazosin     Ipratropium Other (See Comments)     Exacerbates problems      Macrodantin [Nitrofurantoin Macrocrystal] Nausea And Vomiting    Verapamil Other (See Comments)     Turns her into \"zombie\"         Problem List:    Patient Active Problem List   Diagnosis Code    Pacemaker Z95.0    Paroxysmal atrial tachycardia ([facility]) I47.1    Orthostatic hypotension I95.1    COPD (chronic obstructive pulmonary disease) ([facility]) J44.9    Essential hypertension I10    Sepsis ([facility] [address] A41.9    Primary osteoarthritis of right hip M16.11    Cystitis N30.90    Respiratory distress R06.03    Status:                                                                                 BMI:   Wt Readings from Last 3 Encounters:   11/14/22 150 lb (68 kg)   08/18/22 160 lb 9.6 oz (72.8 kg)   07/25/21 150 lb (68 kg)     There is no height or weight on file to calculate BMI.    CBC:   Lab Results   Component Value Date/Time    WBC 14.6 08/18/2022 09:50 AM    RBC 5.43 08/18/2022 09:50 AM    HGB 15.3 08/18/2022 09:50 AM    HCT 48.1 08/18/2022 09:50 AM    MCV 88.6 08/18/2022 09:50 AM    RDW 13.1 08/18/2022 09:50 AM     08/18/2022 09:50 AM       CMP:   Lab Results   Component Value Date/Time     08/18/2022 09:50 AM    K 4.7 08/18/2022 09:50 AM    K 4.0 07/10/2021 12:23 PM     08/18/2022 09:50 AM    CO2 22 08/18/2022 09:50 AM    BUN 25 08/18/2022 09:50 AM    CREATININE 1.0 08/18/2022 09:50 AM    GFRAA >60 08/18/2022 09:50 AM    LABGLOM 52 08/18/2022 09:50 AM    GLUCOSE 92 08/18/2022 09:50 AM    PROT 7.0 07/25/2021 01:45 AM    CALCIUM 9.8 08/18/2022 09:50 AM    BILITOT 0.9 07/25/2021 01:45 AM    ALKPHOS 96 07/25/2021 01:45 AM    AST 28 07/25/2021 01:45 AM    ALT 17 07/25/2021 01:45 AM       POC Tests: No results for input(s): POCGLU, POCNA, POCK, POCCL, POCBUN, POCHEMO, POCHCT in the last 72 hours.     Coags:   Lab Results   Component Value Date/Time    PROTIME 11.2 01/25/2020 03:45 PM    PROTIME 17.3 05/13/2012 06:19 AM    INR 1.0 01/25/2020 03:45 PM    APTT 27.4 02/06/2020 06:58 AM       HCG (If Applicable): No results found for: PREGTESTUR, PREGSERUM, HCG, HCGQUANT     ABGs: No results found for: PHART, PO2ART, CFG4FYQ, DZM2KEK, BEART, E7XDRRUZ     Type & Screen (If Applicable):  No results found for: LABABO, LABRH    Drug/Infectious Status (If Applicable):  No results found for: HIV, HEPCAB    COVID-19 Screening (If Applicable): No results found for: COVID19      Anesthesia Evaluation  Patient summary reviewed no history of anesthetic complications:   Airway: Mallampati: II  TM distance: >3 FB Neck ROM: full  Mouth opening: > = 3 FB   Dental:      Comment: Overbite. Several molars are missing. Poor dentition. Pulmonary: breath sounds clear to auscultation  (+) COPD: mild,            Patient did not smoke on day of surgery. Cardiovascular:  Exercise tolerance: good (>4 METS),   (+) hypertension:, pacemaker: pacemaker, CAD:, dysrhythmias: atrial flutter,         Rhythm: regular  Rate: normal           Beta Blocker:  Not on Beta Blocker         Neuro/Psych:   Negative Neuro/Psych ROS              GI/Hepatic/Renal:   (+) GERD: well controlled,           Endo/Other:                      ROS comment: S/P colon resection. --hx of polyps. Abdominal:             Vascular: negative vascular ROS. Other Findings:             Anesthesia Plan      MAC     ASA 4     (Pt agrees to [facility] and IV sedation.)  Induction: intravenous. Anesthetic plan and risks discussed with patient. Plan discussed with CRNA.     Attending anesthesiologist reviewed and agrees with Pre Eval content                AYAN Juarez - CRNA   11/14/2022

## 2022-11-14 NOTE — DISCHARGE INSTRUCTIONS
[facility] Cardiac Electrophysiology Pacemaker Generator Change Patient Discharge Instructions      Remove pressure dressing after 24 hours. Medications:  Resume Eliquis on 11/19/22. Please resume your normal medication regimen as you are currently taking. A prescription for an antibiotic has been sent to your pharmacy. Follow-Up: You will be seen on 12/2/22 at 11:00 am at the [address] [place] [address]  for an incision check and brief pacemaker evaluation. Incision Care: Please leave the current brown AquaCel dressing on for 1 week. Remove the AquaCel dressing on Monday 11/21/22; but do not remove the steri strips. They will either fall off or be removed at your follow up appointment If you find any redness, increased swelling, drainage, warmth, or have a fever greater than 100 degrees, notify the office immediately at [phone]. Activity: You may continue regular daily activities tomorrow. You also may shower starting tomorrow: but do not let the water run on the incision for one week. The dressing is waterproof. ID Card: You will have a temporary ID card until a permanent card is sent to you by [employer]. The permanent card will look like a s license or credit card and should arrive within 8 weeks. Carry your ID card with you at all times. [facility]    [address] [place] [address] Toney Mosqueraandre    [phone]

## 2022-11-14 NOTE — ANESTHESIA POSTPROCEDURE EVALUATION
Department of Anesthesiology  Postprocedure Note    Patient: Lynsey Mccrary  MRN: [id number]  YOB: 1935  Date of evaluation: 11/14/2022      Procedure Summary     Date: 11/14/22 Room / Location: [facility] CATH LAB    Anesthesia Start: 1054 Anesthesia Stop: 0857    Procedure: PACEMAKER GENERATOR CHANGE W ANESTHESIA Diagnosis: Chest pain, unspecified    Scheduled Providers: AYAN Herring CRNA;  Jourdan Lezama MD Responsible Provider: Jourdan Lezama MD    Anesthesia Type: MAC ASA Status: 4          Anesthesia Type: MAC    Shelbi Phase I:      Shelbi Phase II:        Anesthesia Post Evaluation    Patient location during evaluation: PACU  Patient participation: complete - patient participated  Level of consciousness: awake  Pain score: 0  Airway patency: patent  Nausea & Vomiting: no nausea  Complications: no  Cardiovascular status: hemodynamically stable  Respiratory status: acceptable  Hydration status: stable  Multimodal analgesia pain management approach

## 2022-11-14 NOTE — H&P
[address]  [place] [address] [facility]  History and Physical Examination  Sari Kee  1935  Date of Service: 11/14/2022  PCP: Chente Schwartz MD  Electrophysiologist:  Levar Thomas MD       Subjective: Alonso Gill is seen for follow-up and management of: pacemaker     Last seen in the office with Dr. Jean Marie Thomas on 6/9/2021    PMH as noted below significant for pacemaker in situ, PAT, hypertension, COPD. She feels overall good. On pacemaker check today as noted she had mode switch with atrial flutter for about 20 minutes on August 9, 2022. She does recall that this woke her up in the middle of the night and she felt her heart racing. She was not sure how long this lasted. She denies feeling any other episodes of this. She is not currently on a blood thinner. This was discussed in the past due to her history of PAT and possible future atrial flutter. She is agreeable to this. She denies any falls or bleeding problems. She uses a walker regularly as well as a wheelchair. She denies any swelling or shortness of breath. 11/14/22: The patient is seen in the hospital for elective pacemaker pulse generator change. Risks, benefits, and alternatives of pacemaker generator replacement were discussed in detail today. These risks include but are not limited to bleeding,infection, lead damage or discovery of a non-functional lead which would require lead revision and risks of blood clot, pneumothorax, hemothorax, cardiac perforation and tamponade, lead dislodgement, contrast induced nephropathy leading to short or even long term dialysis, vascular injury requiring emergent surgical repair, stroke and even death. The patient understands these risks and agrees to proceed today.      Patient Active Problem List   Diagnosis    Pacemaker    Paroxysmal atrial tachycardia (HCC)    Orthostatic hypotension    COPD (chronic obstructive pulmonary disease) ([facility] [address] Essential hypertension    Sepsis ([facility] [address]    Primary osteoarthritis of right hip    Cystitis    Respiratory distress    Neurogenic bladder    Hypotension       Current Outpatient Medications   Medication Sig Dispense Refill    raNITIdine HCl (ZANTAC 75 PO) Take by mouth as needed (Patient not taking: Reported on 11/14/2022)      Menthol, Topical Analgesic, (BIOFREEZE EX) Apply topically as needed      apixaban (ELIQUIS) 5 MG TABS tablet Take 1 tablet by mouth 2 times daily 60 tablet 0    acetaminophen (TYLENOL) 500 MG tablet Take 500 mg by mouth every 6 hours as needed for Pain      carboxymethylcellulose 1 % ophthalmic solution Place 1 drop into both eyes daily as needed for Dry Eyes      tetrahydrozoline 0.05 % ophthalmic solution Place 1 drop into both eyes daily as needed      irbesartan (AVAPRO) 150 MG tablet Take 150 mg by mouth every evening   1    triamterene-hydrochlorothiazide (MAXZIDE-25) 37.5-25 MG per tablet Take 1 tablet by mouth every morning   0    sodium chloride (OCEAN, BABY AYR) 0.65 % nasal spray 1 spray by Nasal route as needed for Congestion      Cholecalciferol (VITAMIN D) 50 MCG (2000 UT) TABS tablet Take 2,000 Units by mouth daily        Current Facility-Administered Medications   Medication Dose Route Frequency Provider Last Rate Last Admin    perflutren lipid microspheres (DEFINITY) injection 1.65 mg  1.5 mL IntraVENous ONCE PRN AYAN Teran - ERLIN            Allergies   Allergen Reactions    Codeine Other (See Comments)     headache    Amlodipine Nausea And Vomiting    Augmentin [Amoxicillin-Pot Clavulanate] Nausea And Vomiting    Bactrim Nausea And Vomiting    Benadryl [Diphenhydramine] Nausea And Vomiting    Brovana [Arformoterol] Other (See Comments)     Exacerbates problems      Cardizem [Diltiazem Hcl] Other (See Comments)     Turned her into zombie    Doxazosin     Ipratropium Other (See Comments)     Exacerbates problems      Macrodantin [Nitrofurantoin Macrocrystal] Nausea And Vomiting    Verapamil Other (See Comments)     Turns her into \"zombie\"       ROS:   Constitutional: Negative for fever, activity change and appetite change. HENT: Negative for epistaxis. Eyes: Negative for diploplia, blurred vision. Respiratory: Negative for cough, chest tightness, shortness of breath and wheezing. Cardiovascular: pertinent positives in HPI  Gastrointestinal: Negative for abdominal pain and blood in stool. All other review of systems are negative     PHYSICAL EXAM:      Vitals:    11/14/22 1028   BP: 129/77   Pulse: 87   Resp: 18   Temp: 98.1 °F (36.7 °C)   TempSrc: Tympanic   SpO2: 91%   Weight: 150 lb (68 kg)   Height: 5' 1\" (1.549 m)     Constitutional: well-developed, no acute distress  Eyes: conjunctivae normal, no xanthelasma   Ears, Nose, Throat: oral mucosa moist, no cyanosis   CV: no JVD. Regular rate and rhythm. Normal S1S2 and no S3. No murmurs, rubs, or gallops. PMI is nondisplaced  Lungs: clear to auscultation bilaterally, normal respiratory effort without used of accessory muscles  Abdomen: soft, non-tender, bowel sounds present, no masses or hepatomegaly   Musculoskeletal: no digital clubbing, no edema   Skin: warm, no rashes ; dry flaky skin on legs    Data:    Recent Labs     11/14/22  1020   WBC 16.1*   HGB 15.4   HCT 45.8        No results for input(s): NA, K, CL, CO2, BUN, CREATININE, GLU, CALCIUM in the last 72 hours. Invalid input(s):  MAGNESIUM  Lab Results   Component Value Date/Time    MG 2.1 08/18/2022 09:50 AM     No results for input(s): TSH in the last 72 hours. No results for input(s): INR in the last 72 hours. EKG 8/18/22: Sinus rhythm rate of 68   please see scan in Cardiology.     Echocardiogram 9/26/22    Device Interrogation 8/18/22: [employer] Pacemaker   Underlying rhythm: Presenting rhythm a sensed V sensed at 74 bpm  Mode: DDDR   Battery Voltage/Longevity: 0.63 V, less than 3 months  Pacing: A: 1.3%  RV: Less than 1%    P wave: 2.9 mV Impedance: 390 ohms   Threshold: 0.5 V @0.5 ms  RV R wave: 3.4 mV  Impedance: 130 ohms   Threshold: 2 V @0.5 ms  Episodes: Less than 1% AF burden, 9 mode switches, longest about 20 minutes total  8 high ventricular rate alerts, rates 153 appear one-to-one atrially driven  Reprogramming included: Rate response turned on  Overall device function is normal    All device programmable settings were evaluated per above and in the scanned document, along with iterative adjustments (capture thresholds) to assess and select the most appropriate final programming to provide for consistent delivery of the appropriate therapy and to verify function of the device. Echocardiogram: 1/28/2019  Summary   Ejection fraction is visually estimated at 60%. No regional wall motion abnormalities seen. Normal right ventricle structure and function. Physiologic and/or trace mitral regurgitation is present. Assessment and plan:    1. Dual chamber [employer] pacemaker  -  implantation 6/20/12  - Type ll AV block; hx of near syncope   - ANDREAS 11/2/22.  -Risks, benefits, and alternatives of pacemaker generator replacement were discussed in detail today. These risks include but are not limited to bleeding,infection, lead damage or discovery of a non-functional lead which would require lead revision and risks of blood clot, pneumothorax, hemothorax, cardiac perforation and tamponade, lead dislodgement, contrast induced nephropathy leading to short or even long term dialysis, vascular injury requiring emergent surgical repair, stroke and even death. The patient understands these risks and agrees to proceed today. 2. PAT/diagnosis of paroxysmal atrial flutter  - Intolerant to calcium channel blockers  - Avoided BB due to severe COPD in the past; but tolerating daily selective BB Toprol XL 25 mg QD  -Episode of atrial flutter with mode switch on device check 8/18/22, lasted about 20 minutes. Patient was symptomatic.   Started on Eliquis 8/18/22. -[id number] score of at least 4 (hypertension, age, female)     3. H/O orthostatic hypotension     4. HTN  -Stable on Maxide and Avapro     5. COPD  - Nebulizers and inhalers as noted above  - No recent exacerbations per patient     Plan:      1. Will proceed with pacemaker pulse generator change. 2.  Follow up after the procedure. Encouraged the patient to call the office for any questions or concerns. Thank you for allowing me to participate in your patient's care. Please call me if there are any questions or concerns.       Ashutosh Ontiveros MD  Cardiac Electrophysiology  [address]  [facility] [place] [facility]  10:55 AM  11/14/2022

## 2022-11-15 ENCOUNTER — TELEPHONE (OUTPATIENT)
Dept: NON INVASIVE DIAGNOSTICS | Age: 87
End: 2022-11-15

## 2022-11-15 NOTE — TELEPHONE ENCOUNTER
Patient had a generator change 11/14/2022. POA is calling device clinic d/t patient having post op pain. Please advise.

## 2022-11-16 ENCOUNTER — TELEPHONE (OUTPATIENT)
Dept: NON INVASIVE DIAGNOSTICS | Age: 87
End: 2022-11-16

## 2022-11-16 DIAGNOSIS — Z95.810 ICD (IMPLANTABLE CARDIOVERTER-DEFIBRILLATOR) IN PLACE: Primary | ICD-10-CM

## 2022-11-16 DIAGNOSIS — Z95.810 IMPLANTABLE CARDIOVERTER-DEFIBRILLATOR (ICD) IN SITU: Primary | ICD-10-CM

## 2022-11-16 RX ORDER — HYDROCODONE BITARTRATE AND ACETAMINOPHEN 10; 325 MG/1; MG/1
1 TABLET ORAL EVERY 6 HOURS PRN
COMMUNITY
End: 2022-11-16

## 2022-11-16 RX ORDER — TRAMADOL HYDROCHLORIDE 50 MG/1
50 TABLET ORAL EVERY 6 HOURS PRN
COMMUNITY

## 2022-11-16 RX ORDER — HYDROCODONE BITARTRATE AND ACETAMINOPHEN 10; 325 MG/1; MG/1
1 TABLET ORAL EVERY 6 HOURS PRN
Qty: 28 TABLET | Refills: 0 | OUTPATIENT
Start: 2022-11-16 | End: 2022-11-21

## 2022-11-16 RX ORDER — TRAMADOL HYDROCHLORIDE 50 MG/1
50 TABLET ORAL EVERY 6 HOURS PRN
Qty: 30 TABLET | Refills: 0 | OUTPATIENT
Start: 2022-11-16 | End: 2022-11-21

## 2022-11-16 NOTE — TELEPHONE ENCOUNTER
Spoke with patient's POA let him know recommendations. He verbalized understanding, states at this time patient is very weak unable to come into the office. Also spoke with patient's home nurse she states she will check site and call office if any issues.

## 2022-11-21 ENCOUNTER — TELEPHONE (OUTPATIENT)
Dept: CARDIAC CATH/INVASIVE PROCEDURES | Age: 87
End: 2022-11-21

## 2022-11-21 NOTE — TELEPHONE ENCOUNTER
I called Mr. Jose Hadley to see how his mother, Jr Live is doing since her PGR on 11/14/22. He states she's doing Okay & that the aquacel dressing was removed by Byron Falcon with the steri's intact. She is still having some pain but she is taking tylenol and it is improving. He denies any fevers, redness, bleeding, drainage, or swelling from PPM incision site. She has a home health nurse coming for visits. I reminded him of her follow up appt at the [address] [place] [address] Drive on 12/2/22 at 11:00 am.  He offers no complaints & is thankful for the phone call.

## 2022-12-19 ENCOUNTER — APPOINTMENT (OUTPATIENT)
Dept: GENERAL RADIOLOGY | Age: 87
DRG: 309 | End: 2022-12-19
Payer: MEDICARE

## 2022-12-19 ENCOUNTER — HOSPITAL ENCOUNTER (INPATIENT)
Age: 87
LOS: 6 days | Discharge: HOME | DRG: 309 | End: 2022-12-25
Attending: EMERGENCY MEDICINE | Admitting: INTERNAL MEDICINE
Payer: MEDICARE

## 2022-12-19 DIAGNOSIS — T83.510A URINARY TRACT INFECTION ASSOCIATED WITH CYSTOSTOMY CATHETER, INITIAL ENCOUNTER (HCC): Primary | ICD-10-CM

## 2022-12-19 DIAGNOSIS — I48.91 ATRIAL FIBRILLATION WITH RVR (HCC): ICD-10-CM

## 2022-12-19 DIAGNOSIS — N39.0 URINARY TRACT INFECTION ASSOCIATED WITH CYSTOSTOMY CATHETER, INITIAL ENCOUNTER (HCC): Primary | ICD-10-CM

## 2022-12-19 PROBLEM — I47.1 SVT (SUPRAVENTRICULAR TACHYCARDIA) (HCC): Status: ACTIVE | Noted: 2022-12-19

## 2022-12-19 PROBLEM — I47.10 SVT (SUPRAVENTRICULAR TACHYCARDIA): Status: ACTIVE | Noted: 2022-12-19

## 2022-12-19 LAB
ALBUMIN SERPL-MCNC: 4.1 G/DL (ref 3.5–5.2)
ALP BLD-CCNC: 101 U/L (ref 35–104)
ALT SERPL-CCNC: 27 U/L (ref 0–32)
ANION GAP SERPL CALCULATED.3IONS-SCNC: 14 MMOL/L (ref 7–16)
APTT: 30 SEC (ref 24.5–35.1)
AST SERPL-CCNC: 36 U/L (ref 0–31)
BACTERIA: ABNORMAL /HPF
BILIRUB SERPL-MCNC: 0.5 MG/DL (ref 0–1.2)
BILIRUBIN URINE: NEGATIVE
BLOOD, URINE: ABNORMAL
BUN BLDV-MCNC: 27 MG/DL (ref 6–23)
CALCIUM SERPL-MCNC: 9.8 MG/DL (ref 8.6–10.2)
CHLORIDE BLD-SCNC: 103 MMOL/L (ref 98–107)
CLARITY: ABNORMAL
CO2: 19 MMOL/L (ref 22–29)
COLOR: YELLOW
CREAT SERPL-MCNC: 0.9 MG/DL (ref 0.5–1)
EPITHELIAL CELLS, UA: ABNORMAL /HPF
GFR SERPL CREATININE-BSD FRML MDRD: >60 ML/MIN/1.73
GLUCOSE BLD-MCNC: 107 MG/DL (ref 74–99)
GLUCOSE URINE: NEGATIVE MG/DL
HCT VFR BLD CALC: 45.1 % (ref 34–48)
HEMOGLOBIN: 14.8 G/DL (ref 11.5–15.5)
INR BLD: 1.2
KETONES, URINE: NEGATIVE MG/DL
LEUKOCYTE ESTERASE, URINE: ABNORMAL
MAGNESIUM: 2 MG/DL (ref 1.6–2.6)
MCH RBC QN AUTO: 27.8 PG (ref 26–35)
MCHC RBC AUTO-ENTMCNC: 32.8 % (ref 32–34.5)
MCV RBC AUTO: 84.6 FL (ref 80–99.9)
NITRITE, URINE: POSITIVE
PDW BLD-RTO: 13.6 FL (ref 11.5–15)
PH UA: 6 (ref 5–9)
PLATELET # BLD: 361 E9/L (ref 130–450)
PMV BLD AUTO: 10.3 FL (ref 7–12)
POTASSIUM SERPL-SCNC: 4.4 MMOL/L (ref 3.5–5)
PRO-BNP: 360 PG/ML (ref 0–450)
PROTEIN UA: NEGATIVE MG/DL
PROTHROMBIN TIME: 12.6 SEC (ref 9.3–12.4)
RBC # BLD: 5.33 E12/L (ref 3.5–5.5)
RBC UA: ABNORMAL /HPF (ref 0–2)
REASON FOR REJECTION: NORMAL
REJECTED TEST: NORMAL
SARS-COV-2, NAAT: NOT DETECTED
SODIUM BLD-SCNC: 136 MMOL/L (ref 132–146)
SPECIFIC GRAVITY UA: 1.01 (ref 1–1.03)
T4 TOTAL: 8.9 MCG/DL (ref 4.5–11.7)
TOTAL PROTEIN: 7.5 G/DL (ref 6.4–8.3)
TROPONIN, HIGH SENSITIVITY: 11 NG/L (ref 0–9)
TROPONIN, HIGH SENSITIVITY: 11 NG/L (ref 0–9)
TSH SERPL DL<=0.05 MIU/L-ACNC: 2.01 UIU/ML (ref 0.27–4.2)
UROBILINOGEN, URINE: 0.2 E.U./DL
WBC # BLD: 17 E9/L (ref 4.5–11.5)
WBC UA: ABNORMAL /HPF (ref 0–5)

## 2022-12-19 PROCEDURE — 6360000002 HC RX W HCPCS: Performed by: STUDENT IN AN ORGANIZED HEALTH CARE EDUCATION/TRAINING PROGRAM

## 2022-12-19 PROCEDURE — 83735 ASSAY OF MAGNESIUM: CPT

## 2022-12-19 PROCEDURE — 84443 ASSAY THYROID STIM HORMONE: CPT

## 2022-12-19 PROCEDURE — 87088 URINE BACTERIA CULTURE: CPT

## 2022-12-19 PROCEDURE — 85730 THROMBOPLASTIN TIME PARTIAL: CPT

## 2022-12-19 PROCEDURE — 85027 COMPLETE CBC AUTOMATED: CPT

## 2022-12-19 PROCEDURE — 83880 ASSAY OF NATRIURETIC PEPTIDE: CPT

## 2022-12-19 PROCEDURE — 2140000000 HC CCU INTERMEDIATE R&B

## 2022-12-19 PROCEDURE — 36415 COLL VENOUS BLD VENIPUNCTURE: CPT

## 2022-12-19 PROCEDURE — 85610 PROTHROMBIN TIME: CPT

## 2022-12-19 PROCEDURE — 81001 URINALYSIS AUTO W/SCOPE: CPT

## 2022-12-19 PROCEDURE — 93005 ELECTROCARDIOGRAM TRACING: CPT | Performed by: STUDENT IN AN ORGANIZED HEALTH CARE EDUCATION/TRAINING PROGRAM

## 2022-12-19 PROCEDURE — 84484 ASSAY OF TROPONIN QUANT: CPT

## 2022-12-19 PROCEDURE — 84436 ASSAY OF TOTAL THYROXINE: CPT

## 2022-12-19 PROCEDURE — 87635 SARS-COV-2 COVID-19 AMP PRB: CPT

## 2022-12-19 PROCEDURE — 99285 EMERGENCY DEPT VISIT HI MDM: CPT

## 2022-12-19 PROCEDURE — 87186 SC STD MICRODIL/AGAR DIL: CPT

## 2022-12-19 PROCEDURE — 87077 CULTURE AEROBIC IDENTIFY: CPT

## 2022-12-19 PROCEDURE — 96374 THER/PROPH/DIAG INJ IV PUSH: CPT

## 2022-12-19 PROCEDURE — 71046 X-RAY EXAM CHEST 2 VIEWS: CPT

## 2022-12-19 PROCEDURE — 2580000003 HC RX 258: Performed by: STUDENT IN AN ORGANIZED HEALTH CARE EDUCATION/TRAINING PROGRAM

## 2022-12-19 PROCEDURE — 80053 COMPREHEN METABOLIC PANEL: CPT

## 2022-12-19 RX ADMIN — CEFTRIAXONE 2000 MG: 2 INJECTION, POWDER, FOR SOLUTION INTRAMUSCULAR; INTRAVENOUS at 17:39

## 2022-12-19 ASSESSMENT — ENCOUNTER SYMPTOMS
SORE THROAT: 0
COUGH: 0
BACK PAIN: 0
COLOR CHANGE: 0
EYE PAIN: 0
PHOTOPHOBIA: 0
ABDOMINAL PAIN: 0
VOMITING: 0
CHEST TIGHTNESS: 0
BLOOD IN STOOL: 0
ABDOMINAL DISTENTION: 0
NAUSEA: 0
DIARRHEA: 0
SHORTNESS OF BREATH: 0
CONSTIPATION: 0
RHINORRHEA: 0

## 2022-12-19 ASSESSMENT — LIFESTYLE VARIABLES: HOW OFTEN DO YOU HAVE A DRINK CONTAINING ALCOHOL: NEVER

## 2022-12-19 NOTE — ED PROVIDER NOTES
Name: Jalen Masterson    MRN: [id number]     Date / Time Roomed:  12/19/2022  3:07 PM  ED Bed Assignment:  07/07    ------------------ History of Present Illness --------------------  12/19/22, Time: 3:23 PM EST   Chief Complaint   Patient presents with    Tachycardia     Was sent by Dr. Paulina Clifford for high heart rate. C/O dizziness and being lightheaded for a month. HPI    Jalen Masterson is a 80 y.o. female, with hx of GERD, CAD, CKD, hypertension, pacemaker ([employer]) (patient does have changed 1 month ago, Sees Dr. Alyson Vital, cardiology), suprapubic catheter (due to recurrent UTIs), who presents to the ED today for tachycardia, which began today. Patient states she went to her doctor's office today and had heart rate in the 140s and 150s and he recommended she come up to the ER. Patient states she has been feeling a bit fatigued over past few days. Patient denies any recent fevers, cough, congestion, illness, chest pain, abdominal pain, GI/ complaints. The pt denies other ROS at this time. Patient states she used to be on Eliquis however had to stop taking it because she was lightheaded with this. PCP: La Nena Marley MD.    -------------------- PMH --------------------  Past Medical History:  has a past medical history of Arthritis, AVB (atrioventricular block), CAD (coronary artery disease), Cancer ([address] Chronic kidney disease, stage 3 ([address] Complicated UTI (urinary tract infection), Female bladder prolapse, GERD (gastroesophageal reflux disease), [place] (hard of hearing), Hypertension, Hypokalemia, Psoriasis, and Symptomatic bradycardia. Past Surgical History:  has a past surgical history that includes Cholecystectomy; Kyphosis surgery; Echo Complete (06/20/2012); Hysterectomy; Endoscopy, colon, diagnostic; Colonoscopy; Tonsillectomy; Appendectomy; back surgery; joint replacement; Cardiac catheterization (02/10/2016);  Total hip arthroplasty (Right, 10/15/2019); pacemaker placement (06/20/2012); colectomy; Cystoscopy (N/A, 09/25/2020); and Pacemaker Change (Left, 11/14/2022). Social History:  reports that she quit smoking about 40 years ago. Her smoking use included cigarettes. She started smoking about 57 years ago. She has a 17.00 pack-year smoking history. She has never used smokeless tobacco. She reports that she does not drink alcohol and does not use drugs. Family History: family history includes Asthma in her father; Heart Attack in her mother; Heart Disease in her mother; Other in her father. Allergies: Codeine, Amlodipine, Augmentin [amoxicillin-pot clavulanate], Bactrim, Benadryl [diphenhydramine], Brovana [arformoterol], Cardizem [diltiazem hcl], Doxazosin, Ipratropium, Macrodantin [nitrofurantoin macrocrystal], and Verapamil    The patients past medical history has been reviewed.     -------------------- Current Meds --------------------  Meds:   Current Facility-Administered Medications:     perflutren lipid microspheres (DEFINITY) injection 1.65 mg, 1.5 mL, IntraVENous, ONCE PRN, AYAN Irvin CNP    Current Outpatient Medications:     traMADol (ULTRAM) 50 MG tablet, Take 50 mg by mouth every 6 hours as needed for Pain., Disp: , Rfl:     raNITIdine HCl (ZANTAC 75 PO), Take by mouth as needed (Patient not taking: Reported on 11/14/2022), Disp: , Rfl:     Menthol, Topical Analgesic, (BIOFREEZE EX), Apply topically as needed, Disp: , Rfl:     apixaban (ELIQUIS) 5 MG TABS tablet, Take 1 tablet by mouth 2 times daily, Disp: 60 tablet, Rfl: 0    acetaminophen (TYLENOL) 500 MG tablet, Take 500 mg by mouth every 6 hours as needed for Pain, Disp: , Rfl:     carboxymethylcellulose 1 % ophthalmic solution, Place 1 drop into both eyes daily as needed for Dry Eyes, Disp: , Rfl:     tetrahydrozoline 0.05 % ophthalmic solution, Place 1 drop into both eyes daily as needed, Disp: , Rfl:     irbesartan (AVAPRO) 150 MG tablet, Take 150 mg by mouth every evening , Disp: , Rfl: 1 triamterene-hydrochlorothiazide (MAXZIDE-25) 37.5-25 MG per tablet, Take 1 tablet by mouth every morning , Disp: , Rfl: 0    sodium chloride (OCEAN, BABY AYR) 0.65 % nasal spray, 1 spray by Nasal route as needed for Congestion, Disp: , Rfl:     Cholecalciferol (VITAMIN D) 50 MCG (2000 UT) TABS tablet, Take 2,000 Units by mouth daily , Disp: , Rfl:      The patients home medications have been reviewed. -------------------- ROS --------------------  Review of Systems   Constitutional:  Positive for fatigue. Negative for chills and fever. HENT:  Negative for congestion, rhinorrhea and sore throat. Eyes:  Negative for photophobia, pain and visual disturbance. Respiratory:  Negative for cough, chest tightness and shortness of breath. Cardiovascular:  Positive for palpitations. Negative for chest pain and leg swelling. Gastrointestinal:  Negative for abdominal distention, abdominal pain, blood in stool, constipation, diarrhea, nausea and vomiting. Genitourinary:  Negative for difficulty urinating, dysuria, hematuria, vaginal bleeding and vaginal discharge. Musculoskeletal:  Negative for back pain, neck pain and neck stiffness. Skin:  Negative for color change, rash and wound. Neurological:  Negative for dizziness, syncope, light-headedness and headaches. Psychiatric/Behavioral:  Negative for agitation, behavioral problems and confusion.       -------------------- PE --------------------  Physical Exam  Constitutional:       General: She is not in acute distress. Appearance: Normal appearance. She is obese. She is ill-appearing (chronic). She is not toxic-appearing or diaphoretic. HENT:      Head: Normocephalic and atraumatic. Right Ear: External ear normal.      Left Ear: External ear normal.      Nose: Nose normal. No rhinorrhea. Mouth/Throat:      Pharynx: Oropharynx is clear. Eyes:      General: No scleral icterus. Right eye: No discharge.          Left eye: No discharge. Extraocular Movements: Extraocular movements intact. Conjunctiva/sclera: Conjunctivae normal.      Pupils: Pupils are equal, round, and reactive to light. Cardiovascular:      Rate and Rhythm: Tachycardia present. Rhythm irregular. Pulses: Normal pulses. Pulmonary:      Effort: Pulmonary effort is normal. No respiratory distress. Breath sounds: Normal breath sounds. No stridor. No wheezing. Abdominal:      General: Abdomen is flat. There is no distension. Palpations: Abdomen is soft. Tenderness: There is no abdominal tenderness. There is no guarding. Comments: Suprapubic catheter in place   Musculoskeletal:         General: No swelling or tenderness. Normal range of motion. Cervical back: Normal range of motion. Right lower leg: No edema. Left lower leg: No edema. Skin:     General: Skin is warm and dry. Capillary Refill: Capillary refill takes less than 2 seconds. Coloration: Skin is not jaundiced. Findings: No erythema or rash. Neurological:      General: No focal deficit present. Mental Status: She is alert and oriented to person, place, and time. Psychiatric:         Mood and Affect: Mood normal.         Behavior: Behavior normal.        --------------- External Imaging -------------    -------------------- Procedures --------------------    -------------------- MDM --------------------    Pt presented to ED today for moderate tachycardia and fatigue over past 2-3 days. Pt alert, oriented, no distress. , other vitals stable. Diagnoses considered, but not limited to, include a-fib with RVR, SVT, pacemaker failure, electrolyte / metabolic derrangements, other cardiac etiology, UTI. EKG showed A-fib w/ RVR, interpreted by myself.      Imaging obtained included CXR which was unremarkable, interpreted by myself and confirmed by radiologist.     Bry Antes ordered, including, but not limited to, CBC, BMP, Troponin, BNP, Coags, UA, TSH, Free T4, and COVID & Influenza were were remarkable for positive UTI as well as leukocytosis . Remainder of labwork rather unremarkable. Urine cultures pending. Medications given included IV Rocephin was given after review of previous urine cultures. Did speak with Dr. Mark High who stated she does have chronically positive urine and has been seen by ID for this and does not necessarily need treatment for this. Pacemaker was interrogated, stated she has had multiple runs of SVT over the past month including 3 today, however pacemaker appears to be functioning properly. Initial EKG was suspicious for a-fib with RVR as it appeared irregular, however repeat EKG showed sinus rhythm with marked sinus arrhythmia. Pt's symptoms were somewhat improved after treatment. Vitals improving. Spoke with pt about results. Recommended admission and pt was amenable to plan. Consulted with Dr. Mark High, who will admit for further care. Consult placed for Dr. Brady Billings, [employer] as well. Final diagnosis: catheter associated UTI, A-fib with RVR    EKG Interpretation  Interpreted by emergency department physician. 12/19/22  Time: 1516    Rate: 151  Axis: Normal  GA: ---  QRS: 50  Qtc: 570  Rhythm: irregular  Clinical Impression: A-fib w/ RVR  Comparison to old EKG: changes have occurred when compared to most recent    EKG Interpretation  Interpreted by emergency department physician.     12/19/22  Time: 1530    Rate: 98  Axis: normal  GA: 150  QRS: 62  Qtc: 426  Rhythm: irregular  Clinical Impression: sinus rhythm, with marked arrhythmia  Comparison to old EKG: changes have occurred when compared to most recent        -------------------- Consultations --------------------    Dr. Mark High, for admission    -------------------- ED COURSE & MEDS GIVEN --------------------  Vitals:    12/19/22 1939   BP: (!) 110/50   Pulse: 78   Resp: 19   Temp:    SpO2: 95%        BP (!) 110/50   Pulse 78   Temp 97.8 °F (36.6 °C) (Oral)   Resp 19   Ht 5' 3\" (1.6 m)   Wt 150 lb (68 kg)   SpO2 95%   BMI 26.57 kg/m²     ED Course as of 12/20/22 0003   Mon Dec 19, 2022   1630 WBC(!): 17.0 [PW]   1730 Nitrite, Urine(!): POSITIVE [PW]   1730 Leukocyte Esterase, Urine(!): MODERATE [PW]   2006 Spoke with Dr. Yariel Donnelly, he states she has hx of chronic UTI and her urine does chronically look infected. She has been seen by ID for this. Blood cx ordered. Dr. Yariel Donnelly will admit for further care. Consult placed for Dr. Fabiano Harp, [employer] as well.   [PW]      ED Course User Index  [PW] Lorri Logan, DO         Medications   cefTRIAXone (ROCEPHIN) 2,000 mg in sterile water 20 mL IV syringe (2,000 mg IntraVENous Given 12/19/22 1739)       -------------------- RESULTS --------------------    LABS:  Results for orders placed or performed during the hospital encounter of 12/19/22   COVID-19, Rapid    Specimen: Nasopharyngeal Swab   Result Value Ref Range    SARS-CoV-2, NAAT Not Detected Not Detected   CBC   Result Value Ref Range    WBC 17.0 (H) 4.5 - 11.5 E9/L    RBC 5.33 3.50 - 5.50 E12/L    Hemoglobin 14.8 11.5 - 15.5 g/dL    Hematocrit 45.1 34.0 - 48.0 %    MCV 84.6 80.0 - 99.9 fL    MCH 27.8 26.0 - 35.0 pg    MCHC 32.8 32.0 - 34.5 %    RDW 13.6 11.5 - 15.0 fL    Platelets 142 332 - 156 E9/L    MPV 10.3 7.0 - 12.0 fL   Comprehensive Metabolic Panel   Result Value Ref Range    Sodium 136 132 - 146 mmol/L    Potassium 4.4 3.5 - 5.0 mmol/L    Chloride 103 98 - 107 mmol/L    CO2 19 (L) 22 - 29 mmol/L    Anion Gap 14 7 - 16 mmol/L    Glucose 107 (H) 74 - 99 mg/dL    BUN 27 (H) 6 - 23 mg/dL    Creatinine 0.9 0.5 - 1.0 mg/dL    Est, Glom Filt Rate >60 >=60 mL/min/1.73    Calcium 9.8 8.6 - 10.2 mg/dL    Total Protein 7.5 6.4 - 8.3 g/dL    Albumin 4.1 3.5 - 5.2 g/dL    Total Bilirubin 0.5 0.0 - 1.2 mg/dL    Alkaline Phosphatase 101 35 - 104 U/L    ALT 27 0 - 32 U/L    AST 36 (H) 0 - 31 U/L   Troponin   Result Value Ref Range    Troponin, High Sensitivity 11 (H) 0 - 9 ng/L   Magnesium   Result Value Ref Range    Magnesium 2.0 1.6 - 2.6 mg/dL   TSH   Result Value Ref Range    TSH 2.010 0.270 - 4.200 uIU/mL   T4   Result Value Ref Range    T4, Total 8.9 4.5 - 11.7 mcg/dL   Urinalysis with Microscopic   Result Value Ref Range    Color, UA Yellow Straw/Yellow    Clarity, UA SL CLOUDY Clear    Glucose, Ur Negative Negative mg/dL    Bilirubin Urine Negative Negative    Ketones, Urine Negative Negative mg/dL    Specific Gravity, UA 1.015 1.005 - 1.030    Blood, Urine TRACE-INTACT Negative    pH, UA 6.0 5.0 - 9.0    Protein, UA Negative Negative mg/dL    Urobilinogen, Urine 0.2 <2.0 E.U./dL    Nitrite, Urine POSITIVE (A) Negative    Leukocyte Esterase, Urine MODERATE (A) Negative    WBC, UA 5-10 (A) 0 - 5 /HPF    RBC, UA 0-1 0 - 2 /HPF    Epithelial Cells, UA RARE /HPF    Bacteria, UA MANY (A) None Seen /HPF   Brain Natriuretic Peptide   Result Value Ref Range    Pro- 0 - 450 pg/mL   SPECIMEN REJECTION   Result Value Ref Range    Rejected Test PT PTT     Reason for Rejection see below    APTT   Result Value Ref Range    aPTT 30.0 24.5 - 35.1 sec   Protime-INR   Result Value Ref Range    Protime 12.6 (H) 9.3 - 12.4 sec    INR 1.2    Troponin   Result Value Ref Range    Troponin, High Sensitivity 11 (H) 0 - 9 ng/L       RADIOLOGY:  XR CHEST (2 VW)   Final Result   No acute process. No significant change compared to prior exam including cardiomegaly. -------------------- NURSING NOTES & VITALS --------------------    The nursing notes within the ED encounter and vital signs have been reviewed.      Patient Vitals for the past 8 hrs:   BP Pulse Resp SpO2   12/19/22 1939 (!) 110/50 78 19 95 %   12/19/22 1740 132/60 73 16 95 %   12/19/22 1615 126/62 73 18 94 %       Oxygen Saturation Interpretation: Normal      -------------------- PROGRESS NOTES --------------------  Counseling:  I have spoken with the patient and discussed todays results, in addition to providing specific details for the plan of care and counseling regarding the diagnosis and prognosis. Their questions are answered at this time and they are agreeable with the plan of admission.    -------------------- ADDITIONAL PROVIDER NOTES --------------------    Admission Consultation:  Time: 2006. Spoke with Dr. José Padron. Discussed case. They will admit the patient. This patient's ED course included: a personal history and physicial examination, re-evaluation prior to disposition, multiple bedside re-evaluations, IV medications, cardiac monitoring, continuous pulse oximetry, and complex medical decision making and emergency management    Diagnosis:  1. Urinary tract infection associated with cystostomy catheter, initial encounter ([facility] [address]    2. Atrial fibrillation with RVR ([facility])        Disposition:  Patient's disposition: Admit to telemetry  Patient's condition is fair. Shaan Hairston DO      *NOTE: This report was transcribed using voice recognition software. Every effort was made to ensure accuracy; however, inadvertent computerized transcription errors may be present.          Shaan Hairston DO  Resident  12/20/22 [id number]

## 2022-12-20 ENCOUNTER — APPOINTMENT (OUTPATIENT)
Dept: NUCLEAR MEDICINE | Age: 87
DRG: 309 | End: 2022-12-20
Payer: MEDICARE

## 2022-12-20 ENCOUNTER — APPOINTMENT (OUTPATIENT)
Dept: NON INVASIVE DIAGNOSTICS | Age: 87
DRG: 309 | End: 2022-12-20
Payer: MEDICARE

## 2022-12-20 LAB
ALBUMIN SERPL-MCNC: 3.6 G/DL (ref 3.5–5.2)
ALP BLD-CCNC: 93 U/L (ref 35–104)
ALT SERPL-CCNC: 30 U/L (ref 0–32)
ANION GAP SERPL CALCULATED.3IONS-SCNC: 13 MMOL/L (ref 7–16)
AST SERPL-CCNC: 46 U/L (ref 0–31)
BILIRUB SERPL-MCNC: 0.6 MG/DL (ref 0–1.2)
BUN BLDV-MCNC: 21 MG/DL (ref 6–23)
CALCIUM SERPL-MCNC: 8.9 MG/DL (ref 8.6–10.2)
CHLORIDE BLD-SCNC: 104 MMOL/L (ref 98–107)
CO2: 20 MMOL/L (ref 22–29)
CREAT SERPL-MCNC: 0.9 MG/DL (ref 0.5–1)
GFR SERPL CREATININE-BSD FRML MDRD: >60 ML/MIN/1.73
GLUCOSE BLD-MCNC: 120 MG/DL (ref 74–99)
HCT VFR BLD CALC: 42.6 % (ref 34–48)
HEMOGLOBIN: 13.8 G/DL (ref 11.5–15.5)
MCH RBC QN AUTO: 28.3 PG (ref 26–35)
MCHC RBC AUTO-ENTMCNC: 32.4 % (ref 32–34.5)
MCV RBC AUTO: 87.3 FL (ref 80–99.9)
PDW BLD-RTO: 13.7 FL (ref 11.5–15)
PLATELET # BLD: 291 E9/L (ref 130–450)
PMV BLD AUTO: 10 FL (ref 7–12)
POTASSIUM SERPL-SCNC: 4.4 MMOL/L (ref 3.5–5)
RBC # BLD: 4.88 E12/L (ref 3.5–5.5)
SODIUM BLD-SCNC: 137 MMOL/L (ref 132–146)
TOTAL PROTEIN: 6.3 G/DL (ref 6.4–8.3)
WBC # BLD: 19 E9/L (ref 4.5–11.5)

## 2022-12-20 PROCEDURE — 78452 HT MUSCLE IMAGE SPECT MULT: CPT | Performed by: INTERNAL MEDICINE

## 2022-12-20 PROCEDURE — 6370000000 HC RX 637 (ALT 250 FOR IP): Performed by: INTERNAL MEDICINE

## 2022-12-20 PROCEDURE — 85027 COMPLETE CBC AUTOMATED: CPT

## 2022-12-20 PROCEDURE — 6370000000 HC RX 637 (ALT 250 FOR IP): Performed by: NURSE PRACTITIONER

## 2022-12-20 PROCEDURE — 93005 ELECTROCARDIOGRAM TRACING: CPT | Performed by: STUDENT IN AN ORGANIZED HEALTH CARE EDUCATION/TRAINING PROGRAM

## 2022-12-20 PROCEDURE — 6360000002 HC RX W HCPCS: Performed by: NURSE PRACTITIONER

## 2022-12-20 PROCEDURE — 93018 CV STRESS TEST I&R ONLY: CPT | Performed by: INTERNAL MEDICINE

## 2022-12-20 PROCEDURE — 93005 ELECTROCARDIOGRAM TRACING: CPT | Performed by: INTERNAL MEDICINE

## 2022-12-20 PROCEDURE — 87040 BLOOD CULTURE FOR BACTERIA: CPT

## 2022-12-20 PROCEDURE — 93017 CV STRESS TEST TRACING ONLY: CPT

## 2022-12-20 PROCEDURE — 99291 CRITICAL CARE FIRST HOUR: CPT | Performed by: INTERNAL MEDICINE

## 2022-12-20 PROCEDURE — 78452 HT MUSCLE IMAGE SPECT MULT: CPT

## 2022-12-20 PROCEDURE — A9500 TC99M SESTAMIBI: HCPCS | Performed by: RADIOLOGY

## 2022-12-20 PROCEDURE — 2500000003 HC RX 250 WO HCPCS: Performed by: STUDENT IN AN ORGANIZED HEALTH CARE EDUCATION/TRAINING PROGRAM

## 2022-12-20 PROCEDURE — 93016 CV STRESS TEST SUPVJ ONLY: CPT | Performed by: INTERNAL MEDICINE

## 2022-12-20 PROCEDURE — 3430000000 HC RX DIAGNOSTIC RADIOPHARMACEUTICAL: Performed by: RADIOLOGY

## 2022-12-20 PROCEDURE — 80053 COMPREHEN METABOLIC PANEL: CPT

## 2022-12-20 PROCEDURE — 2140000000 HC CCU INTERMEDIATE R&B

## 2022-12-20 RX ORDER — FLECAINIDE ACETATE 50 MG/1
50 TABLET ORAL EVERY 12 HOURS SCHEDULED
Status: DISCONTINUED | OUTPATIENT
Start: 2022-12-20 | End: 2022-12-25 | Stop reason: HOSPADM

## 2022-12-20 RX ORDER — DILTIAZEM HYDROCHLORIDE 5 MG/ML
10 INJECTION INTRAVENOUS ONCE
Status: COMPLETED | OUTPATIENT
Start: 2022-12-20 | End: 2022-12-20

## 2022-12-20 RX ORDER — METOPROLOL SUCCINATE 25 MG/1
25 TABLET, EXTENDED RELEASE ORAL DAILY
Status: DISCONTINUED | OUTPATIENT
Start: 2022-12-20 | End: 2022-12-25 | Stop reason: HOSPADM

## 2022-12-20 RX ORDER — ACETAMINOPHEN 500 MG
500 TABLET ORAL EVERY 6 HOURS PRN
Status: DISCONTINUED | OUTPATIENT
Start: 2022-12-20 | End: 2022-12-25 | Stop reason: HOSPADM

## 2022-12-20 RX ORDER — ENOXAPARIN SODIUM 100 MG/ML
40 INJECTION SUBCUTANEOUS DAILY
Status: DISCONTINUED | OUTPATIENT
Start: 2022-12-20 | End: 2022-12-20

## 2022-12-20 RX ORDER — LOSARTAN POTASSIUM 50 MG/1
50 TABLET ORAL DAILY
Status: DISCONTINUED | OUTPATIENT
Start: 2022-12-20 | End: 2022-12-25 | Stop reason: HOSPADM

## 2022-12-20 RX ORDER — TECHNETIUM TC-99M SESTAMIBI 1 MG/10ML
30 INJECTION INTRAVENOUS
Status: COMPLETED | OUTPATIENT
Start: 2022-12-20 | End: 2022-12-20

## 2022-12-20 RX ORDER — TECHNETIUM TC-99M SESTAMIBI 1 MG/10ML
14 INJECTION INTRAVENOUS
Status: COMPLETED | OUTPATIENT
Start: 2022-12-20 | End: 2022-12-20

## 2022-12-20 RX ORDER — TRIAMTERENE AND HYDROCHLOROTHIAZIDE 37.5; 25 MG/1; MG/1
1 TABLET ORAL EVERY MORNING
Status: DISCONTINUED | OUTPATIENT
Start: 2022-12-20 | End: 2022-12-24

## 2022-12-20 RX ORDER — CHOLECALCIFEROL (VITAMIN D3) 50 MCG
2000 TABLET ORAL DAILY
Status: DISCONTINUED | OUTPATIENT
Start: 2022-12-20 | End: 2022-12-25 | Stop reason: HOSPADM

## 2022-12-20 RX ADMIN — Medication 14 MILLICURIE: at 09:06

## 2022-12-20 RX ADMIN — ACETAMINOPHEN 500 MG: 500 TABLET, FILM COATED ORAL at 16:27

## 2022-12-20 RX ADMIN — Medication 2000 UNITS: at 13:17

## 2022-12-20 RX ADMIN — DEXTROSE MONOHYDRATE 5 MG/HR: 50 INJECTION, SOLUTION INTRAVENOUS at 01:09

## 2022-12-20 RX ADMIN — LOSARTAN POTASSIUM 50 MG: 50 TABLET, FILM COATED ORAL at 13:17

## 2022-12-20 RX ADMIN — DILTIAZEM HYDROCHLORIDE 10 MG: 5 INJECTION INTRAVENOUS at 00:55

## 2022-12-20 RX ADMIN — REGADENOSON 0.4 MG: 0.08 INJECTION, SOLUTION INTRAVENOUS at 10:30

## 2022-12-20 RX ADMIN — APIXABAN 5 MG: 5 TABLET, FILM COATED ORAL at 13:17

## 2022-12-20 RX ADMIN — APIXABAN 5 MG: 5 TABLET, FILM COATED ORAL at 21:01

## 2022-12-20 RX ADMIN — Medication 34.5 MILLICURIE: at 10:30

## 2022-12-20 RX ADMIN — METOPROLOL SUCCINATE 25 MG: 25 TABLET, EXTENDED RELEASE ORAL at 13:17

## 2022-12-20 RX ADMIN — ACETAMINOPHEN 500 MG: 500 TABLET, FILM COATED ORAL at 00:23

## 2022-12-20 RX ADMIN — TRIAMTERENE AND HYDROCHLOROTHIAZIDE 1 TABLET: 37.5; 25 TABLET ORAL at 13:17

## 2022-12-20 RX ADMIN — FLECAINIDE ACETATE 50 MG: 50 TABLET ORAL at 18:51

## 2022-12-20 ASSESSMENT — PAIN DESCRIPTION - LOCATION
LOCATION: SHOULDER
LOCATION: BACK

## 2022-12-20 ASSESSMENT — PAIN DESCRIPTION - DESCRIPTORS
DESCRIPTORS: OTHER (COMMENT)
DESCRIPTORS: ACHING;DULL;DISCOMFORT

## 2022-12-20 ASSESSMENT — PAIN - FUNCTIONAL ASSESSMENT: PAIN_FUNCTIONAL_ASSESSMENT: ACTIVITIES ARE NOT PREVENTED

## 2022-12-20 ASSESSMENT — PAIN SCALES - GENERAL
PAINLEVEL_OUTOF10: 4
PAINLEVEL_OUTOF10: 10

## 2022-12-20 ASSESSMENT — PAIN DESCRIPTION - ORIENTATION: ORIENTATION: LOWER;MID

## 2022-12-20 ASSESSMENT — PAIN DESCRIPTION - PAIN TYPE: TYPE: CHRONIC PAIN

## 2022-12-20 NOTE — ED NOTES
Patient's pacemaker interrogated at bedside, according to machine information was transmitted. Will notify ER physician.      Yancy Hernandez, RN  12/19/22 [id number] [facility], RN  12/19/22 [id number]

## 2022-12-20 NOTE — ED NOTES
Assumed care of patient. Patient sitting up in bed with dinner tray at bedside. Patient did state she was able to eat little but due to coughing up secretions she did not want to finish. Patient AxOX4 , no signs of distress noted. Respirations easy, non labor. Patient does live with family and ambulates with a walker at home.      Kristofer Bernardo RN  12/19/22 2014

## 2022-12-20 NOTE — CONSULTS
[address] [facility] and [address] [facility] Electrophysiology  Consultation Report  PATIENT: [address] RECORD NUMBER: [id number]  DATE OF SERVICE:  12/20/2022  ATTENDING ELECTROPHYSIOLOGIST: Mackenzie Peralta MD   PRIMARY ELECTROPHYSIOLOGIST: Mackenzie Peralta MD   REFERRING PHYSICIAN:  Lloyd Hooper MD  CHIEF COMPLAINT: Dizziness, Shortness of breath and back pain. HPI: This is a 80 y.o. female with a history of paroxysmal atrial tachycardia, hypertension, COPD, type II AV block with a history of near syncope status post dual-chamber [employer] PPM implanted 06/20/2012 with PGR 11/14/2022. She was previously prescribed Eliquis however she complained of dizziness and stopped this medication at home without notifying her providers. Jalil Perez reports that she went to see her PCP Dr. Coleen Leonard where she complained of shortness of breath, dizziness, back pain she was found to have recurrent PAT and was referred to the emergency department. Her device interrogation revealed multiple episodes of PAT on 12/19/2022 with a V rates into the 150s. Her EKGs in the emergency department demonstrated paroxysmal atrial tachycardia. She was also found to have a positive UTI for Nitrate and leukocyte and was treated with Rocephin. She was started on Cardizem IV that was stopped due to slight hypotension. She has no complaints of chest pain, syncope or near syncope, or lightheadedness. The device site is well healed without erosion or migration.            Patient Active Problem List   Diagnosis    Pacemaker    Paroxysmal atrial tachycardia (HCC)    Orthostatic hypotension    COPD (chronic obstructive pulmonary disease) (HCC)    Essential hypertension    Sepsis (HCC)    Primary osteoarthritis of right hip    Cystitis    Respiratory distress    Neurogenic bladder    Hypotension    Elective replacement indicated for cardiac pacemaker battery at end of lifespan    SVT (supraventricular tachycardia) ([facility]   who presents to the hospital complaining of Shortness of breath. Cardiac electrophysiology service is consulted for PAT. Patient Active Problem List    Diagnosis Date Noted    Pacemaker 2012     Priority: High     Overview Note:     A.  Dual chamber [employer]in [employer] DR MATHIS [id number] implantation 12      SVT (supraventricular tachycardia) ([facility] 2022     Priority: Medium    Elective replacement indicated for cardiac pacemaker battery at end of lifespan 2022     Priority: Medium    Hypotension 2020    Neurogenic bladder 2020    Cystitis 2020    Respiratory distress 2020    Primary osteoarthritis of right hip 10/15/2019    Sepsis ([facility] 05/10/2019    Essential hypertension 2019    COPD (chronic obstructive pulmonary disease) ([facility] 2017    Paroxysmal atrial tachycardia (HCC)     Orthostatic hypotension        Past Medical History:   Diagnosis Date    Arthritis     AVB (atrioventricular block) 2012    CAD (coronary artery disease)     Cancer ([facility]     colon treated with surgery     Chronic kidney disease, stage 3 ([facility]     begining of stage 3    Complicated UTI (urinary tract infection) 10/8/2017    Female bladder prolapse     for OR 20     GERD (gastroesophageal reflux disease)     [place] (hard of hearing)     Hypertension     uncontrolled BP    Hypokalemia 2018    Psoriasis     Symptomatic bradycardia        Family History   Problem Relation Age of Onset    Heart Disease Mother     Heart Attack Mother     Other Father     Asthma Father        Social History     Tobacco Use    Smoking status: Former     Packs/day: 1.00     Years: 17.00     Pack years: 17.00     Types: Cigarettes     Start date: 1965     Quit date: 1982     Years since quittin.5    Smokeless tobacco: Never   Substance Use Topics    Alcohol use: No       Current Facility-Administered Medications   Medication Dose Route Frequency Provider Last Rate Last Admin    acetaminophen (TYLENOL) tablet 500 mg  500 mg Oral Q6H PRN Lisa Dukes MD   500 mg at 12/20/22 0023    vitamin D (CHOLECALCIFEROL) tablet 2,000 Units  2,000 Units Oral Daily Lisa Dukes MD        losartan (COZAAR) tablet 50 mg  50 mg Oral Daily Lisa Dukes MD        triamterene-hydroCHLOROthiazide [facility]) 37.5-25 MG per tablet 1 tablet  1 tablet Oral QAM Lisa Dukes MD        dilTIAZem 100 mg in dextrose 5 % 100 mL infusion ([facility])  2.5-15 mg/hr IntraVENous Continuous Jualexi Loaiza DO   Paused at 12/20/22 0413    regadenoson (LEXISCAN) injection 0.4 mg  0.4 mg IntraVENous ONCE PRN Mook Hopkins, APRN - ERLIN        apixaban [place]) tablet 5 mg  5 mg Oral BID Mook Hopkisn, APRN - CNP        technetium sestamibi (CARDIOLITE) injection 30 millicurie  30 millicurie IntraVENous ONCE PRN Josefina Reeves MD        perflutren lipid microspheres (DEFINITY) injection 1.65 mg  1.5 mL IntraVENous ONCE PRN AYAN Recio - CNP         Current Outpatient Medications   Medication Sig Dispense Refill    traMADol (ULTRAM) 50 MG tablet Take 50 mg by mouth every 6 hours as needed for Pain.       raNITIdine HCl (ZANTAC 75 PO) Take by mouth as needed (Patient not taking: Reported on 11/14/2022)      Menthol, Topical Analgesic, (BIOFREEZE EX) Apply topically as needed      apixaban (ELIQUIS) 5 MG TABS tablet Take 1 tablet by mouth 2 times daily 60 tablet 0    acetaminophen (TYLENOL) 500 MG tablet Take 500 mg by mouth every 6 hours as needed for Pain      carboxymethylcellulose 1 % ophthalmic solution Place 1 drop into both eyes daily as needed for Dry Eyes      tetrahydrozoline 0.05 % ophthalmic solution Place 1 drop into both eyes daily as needed      irbesartan (AVAPRO) 150 MG tablet Take 150 mg by mouth every evening   1    triamterene-hydrochlorothiazide (MAXZIDE-25) 37.5-25 MG per tablet Take 1 tablet by mouth every morning   0    sodium chloride (OCEAN, BABY AYR) 0.65 % nasal spray 1 spray by Nasal route as needed for Congestion      Cholecalciferol (VITAMIN D) 50 MCG (2000 UT) TABS tablet Take 2,000 Units by mouth daily           Allergies   Allergen Reactions    Codeine Other (See Comments)     headache    Amlodipine Nausea And Vomiting    Augmentin [Amoxicillin-Pot Clavulanate] Nausea And Vomiting    Bactrim Nausea And Vomiting    Benadryl [Diphenhydramine] Nausea And Vomiting    Brovana [Arformoterol] Other (See Comments)     Exacerbates problems      Cardizem [Diltiazem Hcl] Other (See Comments)     Turned her into zombie    Doxazosin     Ipratropium Other (See Comments)     Exacerbates problems      Macrodantin [Nitrofurantoin Macrocrystal] Nausea And Vomiting    Verapamil Other (See Comments)     Turns her into \"zombie\"         ROS:   Constitutional: Negative for fever,  and appetite change. + for activity change   HENT: Negative for epistaxis. Eyes: Negative for diploplia, blurred vision. Respiratory: Negative for cough, chest tightness,  and wheezing. Positive for shortness of breath   Cardiovascular: pertinent positives in HPI  Gastrointestinal: Negative for abdominal pain and blood in stool. All other review of systems are negative     PHYSICAL EXAM:   Vitals:    12/20/22 0345 12/20/22 0413 12/20/22 0525 12/20/22 0718   BP: 107/60 (!) 89/40 (!) 109/54 (!) 106/50   Pulse: 70 71 72 68   Resp: 18  20 12   Temp:   98.7 °F (37.1 °C) 98.6 °F (37 °C)   TempSrc:   Oral    SpO2: 95%  95% 96%   Weight:       Height:          Constitutional: Well-developed, no acute distress, nontoxic seen in the ER   Eyes: conjunctivae normal, no xanthelasma   Ears, Nose, Throat: oral mucosa moist, no cyanosis   CV: no JVD. Regular rate and rhythm. Normal S1S2 and no S3. No murmurs, rubs, or gallops.  PMI is nondisplaced  Lungs: clear to auscultation bilaterally, normal respiratory effort without used of accessory muscles  Abdomen: soft, non-tender, bowel sounds present, no masses or hepatomegaly   Musculoskeletal: no digital clubbing, no edema   Skin: warm, no rashes   Site: Left chest, free of erosion or migration    I have personally reviewed the laboratory, cardiac diagnostic and radiographic testing as outlined below:    Data:    Recent Labs     12/19/22  1548 12/20/22  0502   WBC 17.0* 19.0*   HGB 14.8 13.8   HCT 45.1 42.6    291     Recent Labs     12/19/22  1548 12/20/22  0502    137   K 4.4 4.4    104   CO2 19* 20*   BUN 27* 21   CREATININE 0.9 0.9   CALCIUM 9.8 8.9      Lab Results   Component Value Date/Time    MG 2.0 12/19/2022 03:48 PM     Recent Labs     12/19/22  1548   TSH 2.010     Recent Labs     12/19/22  1702   INR 1.2       CXR:   No acute process. No significant change compared to prior exam including cardiomegaly. Telemetry: Paroxysms of atrial tachycardia alternating with sinus rhythm, currently in sinus rhythm with rate of 76 bpm.    EKG 12/20/2022: Atrial tachycardia rate 143 bpm, QRS 60, QTc 518ms  Please see scan in Cardiology. Echocardiogram 09/27/2022:   Findings      Left Ventricle   Left ventricular size is grossly normal.   Ejection fraction is visually estimated at 60%. No regional wall motion abnormalities seen. There is doppler evidence of stage II diastolic dysfunction. Right Ventricle   Normal right ventricular size and function. Left Atrium   Normal sized left atrium. Right Atrium   Normal right atrium size. Mitral Valve   No evidence of mitral valve stenosis. Tricuspid Valve   Physiologic and/or trace tricuspid regurgitation. Aortic Valve   Aortic valve opens well. Pulmonic Valve   Not well visualized. Normal Doppler evaluation. Pericardial Effusion   No evidence of pericardial effusion. Aorta   Aortic root dimension within normal limits. Miscellaneous   Normal Inferior Vena Cava diameter.       Conclusions      Summary   Left ventricular size is grossly normal.   Ejection fraction is visually estimated at 60%. No regional wall motion abnormalities seen. There is doppler evidence of stage II diastolic dysfunction. Signature      ----------------------------------------------------------------   Electronically signed by Taylor Caballero MD(Interpreting physician)   on 09/27/2022 04:09 PM   ----------------------------------------------------------------     M-Mode/2D Measurements & Calculations      LV Diastolic   LV Systolic Dimension: 2.5   AV Cusp Separation: 1.5 cmLA   Dimension: 4   cm                           Dimension: 2.9 cmAO Root   cm             LV Volume Diastolic: 61.9 ml Dimension: 2.9 cm   LV FS:37.5 %   LV Volume Systolic: 32.7 ml   LV PW          LV EDV/LV EDV Index: 86.4   Diastolic: 0.6 FC/07 MU/J^2DG ESV/LV ESV   cm             Index: 22.6 ml/13ml/ m^2   LV PW          EF Calculated: 66.9 %        LA/Aorta: 0.84   Systolic: 1.2  LV Mass Index: 37 l/min*m^2  Ascending Aorta: 3.5 cm   cm                                          LA volume/Index: 34.2 ml   Septum                                      /45FH/Q^5   Diastolic: 0.6 LVOT: 2.2 cm                 RA Area: 17.9 cm^2   cm   Septum                                      IVC Expiration: 1.4 cm   Systolic: 1.2   cm      LV Mass: 64.34   g      Echocardiogram: 1/28/2019  Summary   Ejection fraction is visually estimated at 60%. No regional wall motion abnormalities seen. Normal right ventricle structure and function. Physiologic and/or trace mitral regurgitation is present. Cardiac Catheterization 02/10/2016:   PROCEDURE DATE:  02/10/2016         PROCEDURES:   1. Left heart catheterization   2. Coronary angiography   3. Left ventriculography   4. Perclose vascular closure device      INDICATION FOR PROCEDURE: Chest pain. AUC score 9 indication _3       TECHNIQUE:  The right groin was used for vascular access. Appropriate   puncture site was confirmed by micropuncture. A 6-Australian sheath was placed   with no difficulty. Coronary angiography and left ventriculography were   performed with standard Araseli catheters. Perclose closure device was   utilized with good success. There were no complications. HEMODYNAMIC DATA:   /70. [id number]. . LVED 7. No gradient across the aortic valve on pullback. VENTRICULOGRAPHY:   Single plane ventriculography in the 30-degree ABEBE view demonstrates a left   ventricle of normal dimension and contractility with an estimated ejection   fraction of65% to 70%. There is no  significant mitral insufficiency. CORONARY ANGIOGRAPHY:   The right coronary artery is a large and dominant vessel. It gives rise to a   huge acute marginal branch from its mid portion, which gives septal    branch to the distal inferior septum. Diffuse, moderate luminal   irregularities are noted in the RCA territory with no area of stenosis   exceeding 20%. The left main coronary is a large and long vessel that is free of disease. The left anterior descending artery is a large vessel that gives rise to a   large diagonal branch from its proximal segment. The LAD itself stops at the   apex. The vessels are tortuous consistent with a hypertrophied ventricle. There is no evidence of atherosclerosis. The left circumflex system is large giving rise to a single obtuse marginal   branch of significance. There is no perceptible atherosclerosis. IMPRESSION:   1. Mild single-vessel coronary atherosclerosis   2. Hyperdynamic left ventricular systolic function               Dictated by:  Tao Andrea. Laura Tavarez M.D.     Device Interrogation ( 12/19/2022 )  Make/Model STJ ASSURTIY   Mode DDDR 60/120  P wave: 1.9 mV  Impedance: 400 ohms   Threshold: 0.5 V @ 0.5 ms  RV R wave: 2.8 mV  Impedance: 150 ohms   Threshold: 1.625 V @ 0.5 ms  Pacing: A: 22%  RV: <1%    Battery Voltage/Longevity:  11.6 years     Arrhythmias: Multiple episodes of atrial tachycardia, lasting anywhere from 20seconds to 11 minutes the rate typically in the 150s. Reprogramming included none  Overall device function is normal  All device programmable settings were evaluated per above and in the scanned document, along with iterative adjustments (capture thresholds) to assess and select the most appropriate final programming to provide for consistent delivery of the appropriate therapy and to verify function of the device. I have independently reviewed all of the ECGs and rhythm strips per above     Assessment/Plan:     1. PAT/diagnosis of paroxysmal atrial flutter  - Intolerant to calcium channel blockers  - Avoided BB due to severe COPD in the past; but  was daily selective BB Toprol XL 25 mg QD however stopped prior to admission.   -Episode of atrial flutter with mode switch on device check 8/18/22, lasted about 20 minutes. Patient was symptomatic. Started on Eliquis 8/18/22 however she discontinued due to complaints of dizziness. -[id number] score of at least 4 (hypertension, age, female)  - Recurrent PAT with associated high ventricular rates  12/19/2022 in the setting of a UTI. 2. Dual chamber [employer] Odin pacemaker  -  implantation 6/20/12  - Type ll AV block; hx of near syncope   - PGR 11/14/22.  -Chronically low RV sensing and RV impedance, stable thresholds. 3.  Orthostatic hypotension    4. Hypertension  Maxide/Avapro    5. COPD    Recommendations:  Lexiscan stress testing to assess ability to prescribe class Ic AAD (flecainide)  Resume apixaban 5 mg twice daily  Resume Toprol 25 Mg daily, decreasing or stopping Maxide/Avapro if needed  Pending results of Lexiscan will prescribe flecainide 50 Mg twice daily. EP will follow    I have spent a total of 10 minutes with the patient and the family reviewing the above stated recommendations.   And a total of >50% of that time involved face-to-face time providing counseling and or coordination of care with the other providers. Thank you for allowing me to participate in your patient's care. Please call me if there are any questions or concerns. Discussed with Dr. Dotti Aschoff. Jackie Dixon, AYAN - CNP  Cardiac Electrophysiology  [facility]) Physicians  [facility] [place]: [facility]  10:31 AM  12/20/2022  ______________________________________________________________________    I have personally seen and evaluated the patient. I personally obtained the history and performed the physical exam. I personally reviewed all of the above labs and data. All of the assessments and recommendations are from me. I have reviewed the above documentation completed by the MELISSA. Please see my additional contributions to the HPI, physical exam, and assessment, and recommendations below. History of Present Illness: The patient is a 80year-old lady with significant past medical history of history paroxysmal atrial tachycardia, hypertension, COPD, second degree AV block Mobitz type II, history of near syncope and status post dual-chamber [employer] PPM implanted 06/20/2012 with PGR 11/14/2022. She was previously prescribed Eliquis however she complained of dizziness and stopped this medication by her own. She states that she went to see her PCP, Dr. Luis F Roque, where she complained of shortness of breath, dizziness, and back pain. She was found to have be in tachycardia and was referred to [facility] ([facility]-) ED. In ED shew was noted to be in atrial tachycardia at 143 bpm. She was started on IV Cardizem, drip which was discontinued due to hypotension. Her device interrogation revealed multiple episodes of PAT on 12/19/2022 with a V rates into the 150s. She was also found to have a positive UTI for Nitrate and leukocyte and was treated with Rocephin. The patient denies chest pain, palpitations, syncope, PND, or orthopnea. ROS:   Constitutional: Negative for fever.  Positive for activity change and negative for appetite change. HENT: Negative for epistaxis. Eyes: Negative for diploplia, blurred vision. Respiratory: Negative for cough and chest tightness. Positive for shortness of breath and negative for wheezing. Cardiovascular: pertinent positives in HPI  Gastrointestinal: Negative for abdominal pain and blood in stool. All other review of systems are negative     PHYSICAL EXAM:   Vitals:    12/20/22 0525 12/20/22 0718 12/20/22 1317 12/20/22 1624   BP: (!) 109/54 (!) 106/50 (!) 155/72 (!) 114/56   Pulse: 72 68 73 72   Resp: 20 12 16 16   Temp: 98.7 °F (37.1 °C) 98.6 °F (37 °C)  98.2 °F (36.8 °C)   TempSrc: Oral   Temporal   SpO2: 95% 96% 94% 94%   Weight:       Height:          Constitutional: Well-developed, no acute distress  Eyes: conjunctivae normal, no xanthelasma   Ears, Nose, Throat: oral mucosa moist, no cyanosis   CV: no JVD. Regular rate and rhythm. Normal S1S2 and no S3. No murmurs, rubs, or gallops. PMI is nondisplaced  Lungs: clear to auscultation bilaterally, normal respiratory effort without used of accessory muscles  Abdomen: soft, non-tender, bowel sounds present, no masses or hepatomegaly   Musculoskeletal: no digital clubbing, no edema   Skin: warm, no rashes   Pacemaker site: well healed. No erosion, infection or migration    EKG 12/20/22: Atrial tachycardia at 143 bpm, low voltage, possible IMI    Telemetry 12/20/22 : NSR     Echocardiogram 09/27/22: Findings      Left Ventricle   Left ventricular size is grossly normal.   Ejection fraction is visually estimated at 60%. No regional wall motion abnormalities seen. There is doppler evidence of stage II diastolic dysfunction. Right Ventricle   Normal right ventricular size and function. Left Atrium   Normal sized left atrium. Right Atrium   Normal right atrium size. Mitral Valve   No evidence of mitral valve stenosis. Tricuspid Valve   Physiologic and/or trace tricuspid regurgitation.       Aortic Valve   Aortic valve opens well. Pulmonic Valve   Not well visualized. Normal Doppler evaluation. Pericardial Effusion   No evidence of pericardial effusion. Aorta   Aortic root dimension within normal limits. Miscellaneous   Normal Inferior Vena Cava diameter. Conclusions      Summary   Left ventricular size is grossly normal.   Ejection fraction is visually estimated at 60%. No regional wall motion abnormalities seen. There is doppler evidence of stage II diastolic dysfunction. Signature      ----------------------------------------------------------------   Electronically signed by Caleb Kee MD(Interpreting physician)   on 09/27/2022 04:09 PM   ----------------------------------------------------------------     M-Mode/2D Measurements & Calculations      LV Diastolic   LV Systolic Dimension: 2.5   AV Cusp Separation: 1.5 cmLA   Dimension: 4   cm                           Dimension: 2.9 cmAO Root   cm             LV Volume Diastolic: 80.5 ml Dimension: 2.9 cm   LV FS:37.5 %   LV Volume Systolic: 02.8 ml   LV PW          LV EDV/LV EDV Index: 99.9   Diastolic: 0.6 /47 OU/K^4IH ESV/LV ESV   cm             Index: 22.6 ml/13ml/ m^2   LV PW          EF Calculated: 66.9 %        LA/Aorta: 0.72   Systolic: 1.2  LV Mass Index: 37 l/min*m^2  Ascending Aorta: 3.5 cm   cm                                          LA volume/Index: 34.2 ml   Septum                                      /17CO/Y^1   Diastolic: 0.6 LVOT: 2.2 cm                 RA Area: 17.9 cm^2   cm   Septum                                      IVC Expiration: 1.4 cm   Systolic: 1.2   cm      LV Mass: 64.34   g      Echocardiogram 1/28/19:  Summary   Ejection fraction is visually estimated at 60%. No regional wall motion abnormalities seen. Normal right ventricle structure and function. Physiologic and/or trace mitral regurgitation is present.     Nuclear stress test 12/20/22:  1: No definite evidence of ischemia or scar except soft tissue   attenuation. 2  Normal left ventricular wall motion with estimated left ventricular   ejection fraction of >75%. .   3:  Low dose CT attenuation correction protocol was utilized which   revealed Mild-to-moderate coronary artery calcifications. 4:  Please see separate report for EKG and hemodynamic aspect of the   stress test     5: RISK SCAN:  Low risk scan                 Cardiac Catheterization 02/10/16:   PROCEDURE DATE:  02/10/2016         PROCEDURES:   1. Left heart catheterization   2. Coronary angiography   3. Left ventriculography   4. Perclose vascular closure device      INDICATION FOR PROCEDURE: Chest pain. AUC score 9 indication _3       TECHNIQUE:  The right groin was used for vascular access. Appropriate   puncture site was confirmed by micropuncture. A 6-Kisw[place]ili sheath was placed   with no difficulty. Coronary angiography and left ventriculography were   performed with standard Araseli catheters. Perclose closure device was   utilized with good success. There were no complications. HEMODYNAMIC DATA:   /70. [id number]. . LVED 7. No gradient across the aortic valve on pullback. VENTRICULOGRAPHY:   Single plane ventriculography in the 30-degree ABEBE view demonstrates a left   ventricle of normal dimension and contractility with an estimated ejection   fraction of65% to 70%. There is no  significant mitral insufficiency. CORONARY ANGIOGRAPHY:   The right coronary artery is a large and dominant vessel. It gives rise to a   huge acute marginal branch from its mid portion, which gives septal    branch to the distal inferior septum. Diffuse, moderate luminal   irregularities are noted in the RCA territory with no area of stenosis   exceeding 20%. The left main coronary is a large and long vessel that is free of disease.       The left anterior descending artery is a large vessel that gives rise to a   large diagonal branch from its proximal segment. The LAD itself stops at the   apex. The vessels are tortuous consistent with a hypertrophied ventricle. There is no evidence of atherosclerosis. The left circumflex system is large giving rise to a single obtuse marginal   branch of significance. There is no perceptible atherosclerosis. IMPRESSION:   1. Mild single-vessel coronary atherosclerosis   2. Hyperdynamic left ventricular systolic function               Dictated by:  Linda Smiley M.D. Device Interrogation 12/19/22:  Make/Model STJ ASSURTIY   Mode DDDR 60/120  P wave: 1.9 mV  Impedance: 400 ohms   Threshold: 0.5 V @ 0.5 ms  RV R wave: 2.8 mV  Impedance: 150 ohms   Threshold: 1.625 V @ 0.5 ms  Pacing: A: 22%  RV: <1%    Battery Voltage/Longevity:  11.6 years     Arrhythmias: Multiple episodes of atrial tachycardia, lasting anywhere from 20seconds to 11 minutes the rate typically in the 150s. Reprogramming included none  Overall device function is normal  All device programmable settings were evaluated per above and in the scanned document, along with iterative adjustments (capture thresholds) to assess and select the most appropriate final programming to provide for consistent delivery of the appropriate therapy and to verify function of the device. Assessment/Plan:    1. Paroxysmal atrial tachycardia and atrial flutter  - Intolerant to calcium channel blockers  - Avoided BB due to severe COPD in the past; but  was on Toprol XL 25 mg QD however stopped prior to admission.   - Episode of atrial flutter with mode switch on device check 8/18/22, lasted about 20 minutes. Patient was symptomatic.    - Started on Eliquis 8/18/22 however she discontinued due to complaints of dizziness. - [id number] score of at least 4 (hypertension, age, female)  - Recurrent PAT with associated high ventricular rates  12/19/2022 in the setting of a UTI.      2. Dual chamber [employer] pacemaker  - Implanted

## 2022-12-20 NOTE — ED NOTES
Attempted to re-start diltiazem gtt at a lower dose, 2.5 mg/hr, patient's blood pressure unable to tolerate rate and dropped. This RN stopped gtt and gave bolus of fluids, BP responded well to fluids.      Siobhan Allen, RAE  12/20/22 1384

## 2022-12-20 NOTE — ED NOTES
Patient was given Rocephin at 17:34, 12/19/22. Orders for blood cultures were placed several hours afterwards.      Chito Calabrese RN  12/20/22 9730

## 2022-12-20 NOTE — ED NOTES
Patient's son Parker Navarro is POA and requests to be notified FIRST. [phone].      Mirian Jolley RN  12/19/22 2021

## 2022-12-20 NOTE — ED NOTES
Nurse to nurse called to [facility]. Patient in transport to go to the unit.       Pascual Brown, RN  12/20/22 Phi Edmonds RN  12/20/22 2757

## 2022-12-20 NOTE — ED NOTES
Attempted to contact Dr. Romie Rosario, unable to reach at this time.      Rissa Rush RN  12/20/22 7520

## 2022-12-20 NOTE — PROCEDURES
[place] Nuclear Stress Test:    Cardiologist: Dr. Gavin Fernández MD    Date: 12/20/2022    Indications for study: Chest pain    No chest pain  No new arrhythmias  No EKG changes suggestive of stress induced ischemia  Nuclear images pending    Gavin Fernández MD  [facility]) Cardiology

## 2022-12-20 NOTE — ED NOTES
Patient's heart rate increased to 144-150, ER physician notified, orders given.      Valente Chakraborty RN  12/20/22 9378

## 2022-12-20 NOTE — ED NOTES
This RN has explained to the patient and family multiple times that she is NPO and cannot have anything to eat or drink at this time. This RN perfectserved EP to find out when diet can be changed due to family being persistent about her eating right now.       Claudia Andujar RN  12/20/22 7354

## 2022-12-20 NOTE — ED NOTES
[employer] [facility] called by this RN for update of pacemaker interrogation reading at this time. [employer] representative informed this RN they do not have a report from this patient.      Leena Cortes RN  12/19/22 1929

## 2022-12-21 ENCOUNTER — TELEPHONE (OUTPATIENT)
Dept: NON INVASIVE DIAGNOSTICS | Age: 87
End: 2022-12-21

## 2022-12-21 LAB
ALBUMIN SERPL-MCNC: 3.7 G/DL (ref 3.5–5.2)
ALP BLD-CCNC: 86 U/L (ref 35–104)
ALT SERPL-CCNC: 26 U/L (ref 0–32)
ANION GAP SERPL CALCULATED.3IONS-SCNC: 11 MMOL/L (ref 7–16)
AST SERPL-CCNC: 38 U/L (ref 0–31)
BILIRUB SERPL-MCNC: 0.5 MG/DL (ref 0–1.2)
BUN BLDV-MCNC: 24 MG/DL (ref 6–23)
CALCIUM SERPL-MCNC: 9.4 MG/DL (ref 8.6–10.2)
CHLORIDE BLD-SCNC: 104 MMOL/L (ref 98–107)
CO2: 23 MMOL/L (ref 22–29)
CREAT SERPL-MCNC: 1.1 MG/DL (ref 0.5–1)
EKG ATRIAL RATE: 141 BPM
EKG ATRIAL RATE: 394 BPM
EKG ATRIAL RATE: 71 BPM
EKG ATRIAL RATE: 84 BPM
EKG P AXIS: -52 DEGREES
EKG P AXIS: 38 DEGREES
EKG P-R INTERVAL: 154 MS
EKG P-R INTERVAL: 184 MS
EKG P-R INTERVAL: 202 MS
EKG Q-T INTERVAL: 326 MS
EKG Q-T INTERVAL: 336 MS
EKG Q-T INTERVAL: 376 MS
EKG Q-T INTERVAL: 398 MS
EKG QRS DURATION: 52 MS
EKG QRS DURATION: 58 MS
EKG QRS DURATION: 60 MS
EKG QRS DURATION: 62 MS
EKG QTC CALCULATION (BAZETT): 398 MS
EKG QTC CALCULATION (BAZETT): 444 MS
EKG QTC CALCULATION (BAZETT): 475 MS
EKG QTC CALCULATION (BAZETT): 518 MS
EKG R AXIS: -9 DEGREES
EKG R AXIS: 12 DEGREES
EKG R AXIS: 4 DEGREES
EKG R AXIS: 5 DEGREES
EKG T AXIS: -88 DEGREES
EKG T AXIS: 1 DEGREES
EKG T AXIS: 23 DEGREES
EKG T AXIS: 9 DEGREES
EKG VENTRICULAR RATE: 128 BPM
EKG VENTRICULAR RATE: 143 BPM
EKG VENTRICULAR RATE: 60 BPM
EKG VENTRICULAR RATE: 84 BPM
GFR SERPL CREATININE-BSD FRML MDRD: 48 ML/MIN/1.73
GLUCOSE BLD-MCNC: 109 MG/DL (ref 74–99)
HCT VFR BLD CALC: 41.4 % (ref 34–48)
HEMOGLOBIN: 13.3 G/DL (ref 11.5–15.5)
MCH RBC QN AUTO: 28.1 PG (ref 26–35)
MCHC RBC AUTO-ENTMCNC: 32.1 % (ref 32–34.5)
MCV RBC AUTO: 87.5 FL (ref 80–99.9)
PDW BLD-RTO: 13.6 FL (ref 11.5–15)
PLATELET # BLD: 300 E9/L (ref 130–450)
PMV BLD AUTO: 10.7 FL (ref 7–12)
POTASSIUM SERPL-SCNC: 4.1 MMOL/L (ref 3.5–5)
RBC # BLD: 4.73 E12/L (ref 3.5–5.5)
SODIUM BLD-SCNC: 138 MMOL/L (ref 132–146)
TOTAL PROTEIN: 6.6 G/DL (ref 6.4–8.3)
WBC # BLD: 14.4 E9/L (ref 4.5–11.5)

## 2022-12-21 PROCEDURE — 6370000000 HC RX 637 (ALT 250 FOR IP): Performed by: NURSE PRACTITIONER

## 2022-12-21 PROCEDURE — 6370000000 HC RX 637 (ALT 250 FOR IP): Performed by: INTERNAL MEDICINE

## 2022-12-21 PROCEDURE — 6360000002 HC RX W HCPCS: Performed by: INTERNAL MEDICINE

## 2022-12-21 PROCEDURE — 2580000003 HC RX 258: Performed by: INTERNAL MEDICINE

## 2022-12-21 PROCEDURE — 36415 COLL VENOUS BLD VENIPUNCTURE: CPT

## 2022-12-21 PROCEDURE — 2140000000 HC CCU INTERMEDIATE R&B

## 2022-12-21 PROCEDURE — 93010 ELECTROCARDIOGRAM REPORT: CPT | Performed by: INTERNAL MEDICINE

## 2022-12-21 PROCEDURE — 80053 COMPREHEN METABOLIC PANEL: CPT

## 2022-12-21 PROCEDURE — 85027 COMPLETE CBC AUTOMATED: CPT

## 2022-12-21 PROCEDURE — 93005 ELECTROCARDIOGRAM TRACING: CPT | Performed by: NURSE PRACTITIONER

## 2022-12-21 RX ADMIN — APIXABAN 5 MG: 5 TABLET, FILM COATED ORAL at 20:07

## 2022-12-21 RX ADMIN — CEFTRIAXONE 2000 MG: 2 INJECTION, POWDER, FOR SOLUTION INTRAMUSCULAR; INTRAVENOUS at 15:58

## 2022-12-21 RX ADMIN — ACETAMINOPHEN 500 MG: 500 TABLET, FILM COATED ORAL at 17:18

## 2022-12-21 RX ADMIN — APIXABAN 5 MG: 5 TABLET, FILM COATED ORAL at 09:40

## 2022-12-21 RX ADMIN — TRIAMTERENE AND HYDROCHLOROTHIAZIDE 1 TABLET: 37.5; 25 TABLET ORAL at 09:39

## 2022-12-21 RX ADMIN — Medication 2000 UNITS: at 09:39

## 2022-12-21 RX ADMIN — LOSARTAN POTASSIUM 50 MG: 50 TABLET, FILM COATED ORAL at 09:39

## 2022-12-21 RX ADMIN — METOPROLOL SUCCINATE 25 MG: 25 TABLET, EXTENDED RELEASE ORAL at 09:39

## 2022-12-21 RX ADMIN — FLECAINIDE ACETATE 50 MG: 50 TABLET ORAL at 09:39

## 2022-12-21 RX ADMIN — FLECAINIDE ACETATE 50 MG: 50 TABLET ORAL at 20:07

## 2022-12-21 ASSESSMENT — PAIN DESCRIPTION - ONSET: ONSET: ON-GOING

## 2022-12-21 ASSESSMENT — PAIN DESCRIPTION - ORIENTATION: ORIENTATION: MID

## 2022-12-21 ASSESSMENT — PAIN DESCRIPTION - LOCATION: LOCATION: HEAD

## 2022-12-21 ASSESSMENT — PAIN DESCRIPTION - FREQUENCY: FREQUENCY: CONTINUOUS

## 2022-12-21 ASSESSMENT — PAIN SCALES - GENERAL
PAINLEVEL_OUTOF10: 0
PAINLEVEL_OUTOF10: 3
PAINLEVEL_OUTOF10: 0

## 2022-12-21 ASSESSMENT — PAIN DESCRIPTION - PAIN TYPE: TYPE: ACUTE PAIN

## 2022-12-21 ASSESSMENT — PAIN DESCRIPTION - DESCRIPTORS: DESCRIPTORS: ACHING;DISCOMFORT;DULL

## 2022-12-21 ASSESSMENT — PAIN - FUNCTIONAL ASSESSMENT: PAIN_FUNCTIONAL_ASSESSMENT: PREVENTS OR INTERFERES SOME ACTIVE ACTIVITIES AND ADLS

## 2022-12-21 NOTE — PLAN OF CARE
Problem: Pain  Goal: Verbalizes/displays adequate comfort level or baseline comfort level  Outcome: Progressing     Problem: Safety - Adult  Goal: Free from fall injury  Outcome: Progressing  Flowsheets (Taken 12/21/2022 0003)  Free From Fall Injury: Instruct family/caregiver on patient safety     Problem: ABCDS Injury Assessment  Goal: Absence of physical injury  Outcome: Progressing  Flowsheets (Taken 12/21/2022 0003)  Absence of Physical Injury: Implement safety measures based on patient assessment

## 2022-12-21 NOTE — ACP (ADVANCE CARE PLANNING)
.Advance Care Planning   Healthcare Decision Maker:    Primary Decision Maker: Elle Ormond [facility] - [phone]    Click here to complete Healthcare Decision Makers including selection of the Healthcare Decision Maker Relationship (ie \"Primary\").

## 2022-12-21 NOTE — CARE COORDINATION
Met with pt and son at bedside to discuss discharge planning/transition of care. Pt has her son and daughter n law living with her. Pt lives in a 2 story home, but stays on first floor where bedroom and bathroom are located. Pt has 2 steps to enter home. Pt uses a walker, shower chair,  and has a BP cuff and pulse ox at home. Pt is not an active . Pt's PCP is Dr. Brea Pulido and [facility] in [place] is her pharmacy. Plan is home at discharge, with son to transport. Pt states she has been up walking in room, with staff and feels good. EP signed off, ID consulted d/t elevated WBC. Per pt SuperMama comes into home 1Xmonth for cath change, called Sharri Thurman to update. Cynthia Williamson, MSW, LSW

## 2022-12-21 NOTE — PROGRESS NOTES
Positive urine culture (enterococcus) with leukocytosis   She has history of complex UTI and has been treated by ID   In the past  ?UTI vs colonization?    Will ask ID to see her again

## 2022-12-21 NOTE — TELEPHONE ENCOUNTER
----- Message from AYAN Gonzalez CNP sent at 12/21/2022 10:15 AM EST -----  James Kee was seen inpatient and will need an EKG in one week, follow-up with Dr. Isabell Alvarez or a provider in 4-6 weeks, thanks.

## 2022-12-21 NOTE — H&P
[address]                  [address] [facility]  [facility]                              HISTORY AND PHYSICAL    PATIENT NAME: ED MIRANDA                        :        1935  MED REC NO:   [id number]                            ROOM:       [id number]  ACCOUNT NO:   [de-identified]                           ADMIT DATE: 2022  PROVIDER:     Aquiles Mccann MD      The patient is an 59-year-old female. CHIEF COMPLAINT:  Dizziness, generalized weakness, rapid heartbeat. HISTORY OF PRESENT ILLNESS:  The patient is an 59-year-old female who  has been lost to follow up with me for almost two and half years who has  a history of paroxysmal atrial tachycardia, hypertension, COPD, type 2  AV block, history of recurrent near syncope, status post dual chamber  pacemaker, who had stopped her Eliquis on her own because of dizziness. She actually presents to my office and states that she is feeling pretty  well except for having some shakiness and feeling little bit dizzy. Examination demonstrated rapid heart rate of approximately 140 beats per  minute, prompting ambulance transport to the emergency room. MEDICATIONS:  She had stopped most of her medications except her  irbesartan and I think her Maxzide. SOCIAL HISTORY:  Lives with her son. No alcohol. No tobacco.    FAMILY HISTORY:  Not available. REVIEW OF SYSTEMS:  NEUROMUSCULAR:  Dizziness and weakness. RESPIRATORY:  Denies shortness of breath, cough, fevers, or night  sweats. CARDIOVASCULAR:  Denies chest pain. Does have palpitations. GASTROINTESTINAL:  No nausea, vomiting, or diarrhea.   GENITOURINARY:  She has suprapubic catheter, has chronic asymptomatic  bacteriuria and at times has had complex urinary tract infection,  requiring intravenous antibiotics and Infectious Disease referral.  At  this time, she is having no symptoms other than the active urinary  sediment which is a chronic finding. MUSCULOSKELETAL:  Denies. HEMATOLOGIC:  Denies. PHYSICAL EXAMINATION:  GENERAL:  The patient is a pleasant, slightly confused, elderly female  in moderate acute distress. HEENT:  Head normal size and shape. Pupils equal and reactive. Sclerae  were clear. Extraocular movements were intact. Fundi were not  visualized. Oral mucous membranes were slightly dry. NECK:  Supple. Neck veins were flat. No jugular venous distention. No  carotid bruits. LUNGS:  Diminished breath sounds bilaterally. CARDIOVASCULAR:  Rapid heartbeat 140 beats per minute with distant  tones. ABDOMEN:  Soft without tenderness. GENITOURINARY:  Deferred. RECTAL:  Deferred. EXTREMITIES:  No edema, no clubbing, no cyanosis. NEUROLOGIC:  No focal neurologic deficits. CLINICAL IMPRESSION:  1. Recurrent paroxysmal atrial tachycardia. 2.  COPD. 3.  Type 2 AV block. 4.  History of near syncope. 5.  Dual-chamber pacemaker. 6.  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         Ronald Sharif MD    D: 12/20/2022 16:03:05       T: 12/20/2022 16:05:50     [email or web address]  Job#: [id number]     Doc#: [id number]    CC:

## 2022-12-21 NOTE — PATIENT CARE CONFERENCE
[facility] Quality Flow/Interdisciplinary Rounds Progress Note        Quality Flow Rounds held on December 21, 2022    Disciplines Attending:  Bedside Nurse, , , and Nursing Unit [address]ay was admitted on 12/19/2022  3:07 PM    Anticipated Discharge Date:       Disposition:    Justino Score:  Justino Scale Score: 18    Readmission Risk              Risk of Unplanned Readmission:  9           Discussed patient goal for the day, patient clinical progression, and barriers to discharge.   The following Goal(s) of the Day/Commitment(s) have been identified:  Diagnostics - Report Results and Labs - Report Results      Nando Teran RN  December 21, 2022

## 2022-12-21 NOTE — PLAN OF CARE
Problem: Pain  Goal: Verbalizes/displays adequate comfort level or baseline comfort level  12/21/2022 1232 by Norma Suárez RN  Outcome: Progressing  12/21/2022 0008 by Yoon Bentley RN  Outcome: Progressing     Problem: Safety - Adult  Goal: Free from fall injury  12/21/2022 1232 by Norma Suárez RN  Outcome: Progressing  12/21/2022 0008 by Yoon Bentley RN  Outcome: Progressing  Flowsheets (Taken 12/21/2022 0003)  Free From Fall Injury: Instruct family/caregiver on patient safety     Problem: ABCDS Injury Assessment  Goal: Absence of physical injury  12/21/2022 1232 by Norma Suárez RN  Outcome: Progressing  12/21/2022 0008 by Yoon Bentley RN  Outcome: Progressing  Flowsheets (Taken 12/21/2022 0003)  Absence of Physical Injury: Implement safety measures based on patient assessment     Problem: Skin/Tissue Integrity  Goal: Absence of new skin breakdown  Description: 1. Monitor for areas of redness and/or skin breakdown  2. Assess vascular access sites hourly  3. Every 4-6 hours minimum:  Change oxygen saturation probe site  4. Every 4-6 hours:  If on nasal continuous positive airway pressure, respiratory therapy assess nares and determine need for appliance change or resting period.   Outcome: Progressing

## 2022-12-21 NOTE — PROGRESS NOTES
[address] [facility],2[address] Floor and [address] [facility] Electrophysiology  Inpatient Progress Report  PATIENT: Reanna Moreland Canute RECORD NUMBER: [id number]  DATE OF SERVICE:  12/21/2022  ATTENDING ELECTROPHYSIOLOGIST: Amanda Montoya MD   PRIMARY ELECTROPHYSIOLOGIST: Amanda Montoya MD   REFERRING PHYSICIAN: No ref. provider found and Malini García MD  CHIEF COMPLAINT: Dizziness, Shortness of breath and back pain. HPI: The patient is a 80year-old lady with significant past medical history of history paroxysmal atrial tachycardia, hypertension, COPD, second degree AV block Mobitz type II, history of near syncope and status post dual-chamber [employer] PPM implanted 06/20/2012 with PGR 11/14/2022. She was previously prescribed Eliquis however she complained of dizziness and stopped this medication by her own. She states that she went to see her PCP, Dr. Brea Pulido, where she complained of shortness of breath, dizziness, and back pain. She was found to have be in tachycardia and was referred to [facility] ([facility] ED. In ED shew was noted to be in atrial tachycardia at 143 bpm. She was started on IV Cardizem, drip which was discontinued due to hypotension. Her device interrogation revealed multiple episodes of PAT on 12/19/2022 with a V rates into the 150s. She was also found to have a positive UTI for Nitrate and leukocyte and was treated with Rocephin. The patient denies chest pain, palpitations, syncope, PND, or orthopnea. 12/21/2022: The patient remains in sinus and is A paced, without recurrent AT she has been started on Flecainide/Toprol/Eliquis and is tolerating this will. Her Stress test was low risk yesterday. Patient Active Problem List    Diagnosis Date Noted    Pacemaker 06/27/2012     Priority: High     Overview Note:     A.  Dual chamber [employer] DR MATHIS [id number] implantation 6/20/12      SVT (supraventricular tachycardia) ([facility] 12/19/2022     Priority: Medium    Elective replacement indicated for cardiac pacemaker battery at end of lifespan 2022     Priority: Medium    Hypotension 2020    Neurogenic bladder 2020    Cystitis 2020    Respiratory distress 2020    Primary osteoarthritis of right hip 10/15/2019    Sepsis ([facility] 05/10/2019    Essential hypertension 2019    COPD (chronic obstructive pulmonary disease) ([facility] 2017    Atrial tachycardia (HCC)     Orthostatic hypotension        Past Medical History:   Diagnosis Date    Arthritis     AVB (atrioventricular block) 2012    CAD (coronary artery disease)     Cancer (HCC)     colon treated with surgery     Chronic kidney disease, stage 3 ([facility]     begining of stage 3    Complicated UTI (urinary tract infection) 10/8/2017    Female bladder prolapse     for OR 20     GERD (gastroesophageal reflux disease)     [place] (hard of hearing)     Hypertension     uncontrolled BP    Hypokalemia 2018    Psoriasis     Symptomatic bradycardia        Family History   Problem Relation Age of Onset    Heart Disease Mother     Heart Attack Mother     Other Father     Asthma Father        Social History     Tobacco Use    Smoking status: Former     Packs/day: 1.00     Years: 17.00     Pack years: 17.00     Types: Cigarettes     Start date: 1965     Quit date: 1982     Years since quittin.5    Smokeless tobacco: Never   Substance Use Topics    Alcohol use: No       Current Facility-Administered Medications   Medication Dose Route Frequency Provider Last Rate Last Admin    acetaminophen (TYLENOL) tablet 500 mg  500 mg Oral Q6H PRN Escobar Boyer MD   500 mg at 22 1627    vitamin D (CHOLECALCIFEROL) tablet 2,000 Units  2,000 Units Oral Daily Escobar Boyer MD   2,000 Units at 22 0939    losartan (COZAAR) tablet 50 mg  50 mg Oral Daily Escobar Boyer MD   50 mg at 22 0939    triamterene-hydroCHLOROthiazide (MAXZIDE-25) 37.5-25 MG per tablet 1 tablet 1 tablet Oral QAM Sid Cervantes MD   1 tablet at 12/21/22 1554    apixaban (ELIQUIS) tablet 5 mg  5 mg Oral BID Brendon Nipangelica, APRN - CNP   5 mg at 12/21/22 0940    metoprolol succinate (TOPROL XL) extended release tablet 25 mg  25 mg Oral Daily Brendon Nipple, APRN - CNP   25 mg at 12/21/22 8234    flecainide (TAMBOCOR) tablet 50 mg  50 mg Oral 2 times per day Sameer Keller MD   50 mg at 12/21/22 5022        Allergies   Allergen Reactions    Codeine Other (See Comments)     headache    Amlodipine Nausea And Vomiting    Augmentin [Amoxicillin-Pot Clavulanate] Nausea And Vomiting    Bactrim Nausea And Vomiting    Benadryl [Diphenhydramine] Nausea And Vomiting    Brovana [Arformoterol] Other (See Comments)     Exacerbates problems      Cardizem [Diltiazem Hcl] Other (See Comments)     Turned her into zombie    Doxazosin     Ipratropium Other (See Comments)     Exacerbates problems      Macrodantin [Nitrofurantoin Macrocrystal] Nausea And Vomiting    Verapamil Other (See Comments)     Turns her into \"zombie\"         ROS:   Constitutional: Negative for fever, activity change and appetite change. HENT: Negative for epistaxis. Eyes: Negative for diploplia, blurred vision. Respiratory: Negative for cough, chest tightness, shortness of breath and wheezing. Cardiovascular: pertinent positives in HPI  Gastrointestinal: Negative for abdominal pain and blood in stool. All other review of systems are negative     PHYSICAL EXAM:   Vitals:    12/20/22 2030 12/20/22 2310 12/21/22 0339 12/21/22 0749   BP:  (!) 115/59 (!) 116/57 (!) 125/59   Pulse: 65 65 60 61   Resp:  19 16 18   Temp:  97 °F (36.1 °C) 97.4 °F (36.3 °C) 97.1 °F (36.2 °C)   TempSrc:  Temporal Temporal Temporal   SpO2:  93% 95% 95%   Weight:       Height:          Constitutional: Well-developed, no acute distress  Eyes: conjunctivae normal, no xanthelasma   Ears, Nose, Throat: oral mucosa moist, no cyanosis   CV: no JVD.  Regular rate and rhythm. Normal S1S2 and no S3. No murmurs, rubs, or gallops. PMI is nondisplaced  Lungs: clear to auscultation bilaterally, normal respiratory effort without used of accessory muscles  Abdomen: soft, non-tender, bowel sounds present, no masses or hepatomegaly   Musculoskeletal: no digital clubbing, no edema   Skin: warm, no rashes   Pacemaker site: well healed. No erosion, infection or migration    I have personally reviewed the laboratory, cardiac diagnostic and radiographic testing as outlined below:    Data:    Recent Labs     12/19/22  1548 12/20/22  0502 12/21/22  0552   WBC 17.0* 19.0* 14.4*   HGB 14.8 13.8 13.3   HCT 45.1 42.6 41.4    291 300     Recent Labs     12/19/22  1548 12/20/22  0502 12/21/22  0552    137 138   K 4.4 4.4 4.1    104 104   CO2 19* 20* 23   BUN 27* 21 24*   CREATININE 0.9 0.9 1.1*   CALCIUM 9.8 8.9 9.4      Lab Results   Component Value Date/Time    MG 2.0 12/19/2022 03:48 PM     Recent Labs     12/19/22  1548   TSH 2.010     Recent Labs     12/19/22  1702   INR 1.2       Telemetry: AP-VS     EKG 12/20/22: Atrial tachycardia at 143 bpm, low voltage, possible IMI Please see scan in Cardiology. Echocardiogram 09/27/22: Findings      Left Ventricle   Left ventricular size is grossly normal.   Ejection fraction is visually estimated at 60%. No regional wall motion abnormalities seen. There is doppler evidence of stage II diastolic dysfunction. Right Ventricle   Normal right ventricular size and function. Left Atrium   Normal sized left atrium. Right Atrium   Normal right atrium size. Mitral Valve   No evidence of mitral valve stenosis. Tricuspid Valve   Physiologic and/or trace tricuspid regurgitation. Aortic Valve   Aortic valve opens well. Pulmonic Valve   Not well visualized. Normal Doppler evaluation. Pericardial Effusion   No evidence of pericardial effusion.       Aorta   Aortic root dimension within normal limits. Miscellaneous   Normal Inferior Vena Cava diameter. Conclusions      Summary   Left ventricular size is grossly normal.   Ejection fraction is visually estimated at 60%. No regional wall motion abnormalities seen. There is doppler evidence of stage II diastolic dysfunction. Signature      ----------------------------------------------------------------   Electronically signed by Sharon Jo MD(Interpreting physician)   on 09/27/2022 04:09 PM   ----------------------------------------------------------------     M-Mode/2D Measurements & Calculations      LV Diastolic   LV Systolic Dimension: 2.5   AV Cusp Separation: 1.5 cmLA   Dimension: 4   cm                           Dimension: 2.9 cmAO Root   cm             LV Volume Diastolic: 73.7 ml Dimension: 2.9 cm   LV FS:37.5 %   LV Volume Systolic: 23.0 ml   LV PW          LV EDV/LV EDV Index: 07.8   Diastolic: 0.6 VI/91 QP/P^5RU ESV/LV ESV   cm             Index: 22.6 ml/13ml/ m^2   LV PW          EF Calculated: 66.9 %        LA/Aorta: 2.39   Systolic: 1.2  LV Mass Index: 37 l/min*m^2  Ascending Aorta: 3.5 cm   cm                                          LA volume/Index: 34.2 ml   Septum                                      /53AT/J^4   Diastolic: 0.6 LVOT: 2.2 cm                 RA Area: 17.9 cm^2   cm   Septum                                      IVC Expiration: 1.4 cm   Systolic: 1.2   cm      LV Mass: 64.34   g        Echocardiogram 1/28/19:  Summary   Ejection fraction is visually estimated at 60%. No regional wall motion abnormalities seen. Normal right ventricle structure and function. Physiologic and/or trace mitral regurgitation is present. Nuclear stress test 12/20/22:  1: No definite evidence of ischemia or scar except soft tissue   attenuation. 2  Normal left ventricular wall motion with estimated left ventricular   ejection fraction of >75%. .   3:  Low dose CT attenuation correction protocol was utilized which   revealed Mild-to-moderate coronary artery calcifications. 4:  Please see separate report for EKG and hemodynamic aspect of the   stress test     5: RISK SCAN:  Low risk scan                   Cardiac Catheterization 02/10/16:   PROCEDURE DATE:  02/10/2016         PROCEDURES:   1. Left heart catheterization   2. Coronary angiography   3. Left ventriculography   4. Perclose vascular closure device      INDICATION FOR PROCEDURE: Chest pain. AUC score 9 indication _3       TECHNIQUE:  The right groin was used for vascular access. Appropriate   puncture site was confirmed by micropuncture. A 6-Mohawk sheath was placed   with no difficulty. Coronary angiography and left ventriculography were   performed with standard Araseli catheters. Perclose closure device was   utilized with good success. There were no complications. HEMODYNAMIC DATA:   /70. [id number]. . LVED 7. No gradient across the aortic valve on pullback. VENTRICULOGRAPHY:   Single plane ventriculography in the 30-degree ABEBE view demonstrates a left   ventricle of normal dimension and contractility with an estimated ejection   fraction of65% to 70%. There is no  significant mitral insufficiency. CORONARY ANGIOGRAPHY:   The right coronary artery is a large and dominant vessel. It gives rise to a   huge acute marginal branch from its mid portion, which gives septal    branch to the distal inferior septum. Diffuse, moderate luminal   irregularities are noted in the RCA territory with no area of stenosis   exceeding 20%. The left main coronary is a large and long vessel that is free of disease. The left anterior descending artery is a large vessel that gives rise to a   large diagonal branch from its proximal segment. The LAD itself stops at the   apex. The vessels are tortuous consistent with a hypertrophied ventricle. There is no evidence of atherosclerosis.       The left circumflex system is large giving rise to a single obtuse marginal   branch of significance. There is no perceptible atherosclerosis. IMPRESSION:   1. Mild single-vessel coronary atherosclerosis   2. Hyperdynamic left ventricular systolic function               Dictated by:  Renae Lockhart. Tamela Blackmon M.D. Device Interrogation 12/19/22:  Make/Model STJ ASSURTIY   Mode DDDR 60/120  P wave: 1.9 mV  Impedance: 400 ohms   Threshold: 0.5 V @ 0.5 ms  RV R wave: 2.8 mV  Impedance: 150 ohms   Threshold: 1.625 V @ 0.5 ms  Pacing: A: 22%  RV: <1%    Battery Voltage/Longevity:  11.6 years     Arrhythmias: Multiple episodes of atrial tachycardia, lasting anywhere from 20seconds to 11 minutes the rate typically in the 150s. Reprogramming included none  Overall device function is normal  All device programmable settings were evaluated per above and in the scanned document, along with iterative adjustments (capture thresholds) to assess and select the most appropriate final programming to provide for consistent delivery of the appropriate therapy and to verify function of the device. I have independently reviewed all of the ECGs and rhythm strips per above     Assessment/Plan:     1. Paroxysmal atrial tachycardia and atrial flutter  - Intolerant to calcium channel blockers  - Avoided BB due to severe COPD in the past; but  was on Toprol XL 25 mg QD however stopped prior to admission.   - Episode of atrial flutter with mode switch on device check 8/18/22, lasted about 20 minutes. Patient was symptomatic.    - Started on Eliquis 8/18/22 however she discontinued due to complaints of dizziness. - [id number] score of at least 4 (hypertension, age, female)  - Recurrent PAT with associated high ventricular rates  12/19/2022 in the setting of a UTI. - Placed on Flecainide 12/20/2022     2.  Dual chamber [employer] pacemaker  - Implanted 6/20/12  - Indication: second degree AV block Mobitz Type ll and history of near syncope   - Pulse generator change on 11/14/22. - Chronically low sensing and impedance of RV lead with stable threshold. 3.  History of orthostatic hypotension     4. Hypertension  - On Maxide and Avapro     5. COPD    Recommendations:  Continue Flecainide 50mg BID. Continue Toprol 25mg daily. Continue Apixaban 5mg BID. If hypotension becomes a problem consider stopping or reducing Cozaar or Maxize   She will need an EKG in one week to assess for QRS duration. Follow-up with Dr. Ron Kaufman or a provider in one month. Ep to sign off please call if needed. I have spent a total of 10  minutes with the patient and the family reviewing the above stated recommendations. And a total of >50% of that time involved face-to-face time providing counseling and or coordination of care with the other providers. Thank you for allowing me to participate in your patient's care. Please call me if there are any questions or concerns. Discussed with Dr. Ron Kaufman.    Chris Campbell, AYAN - CNP  Cardiac Electrophysiology  [facility]) Physicians  [facility]  10:04 AM  12/21/2022

## 2022-12-21 NOTE — CONSULTS
[address] [place] [address] [facility]    Consultation Note     Admit Date: 12/19/2022  3:07 PM    Reason for Consult: UTI    Attending Physician:  Chato Zuleta MD     Chief Complaint: UTI    HISTORY OF PRESENT ILLNESS:   59-year-old female with past medical history of paroxysmal atrial tachycardia, hypertension, COPD, type 2 AV block, history of recurrent near syncope, status post dual chamber pacemaker, status post suprapubic catheter, who had stopped her Eliquis on her own because of dizziness presented with A. fib RVR. She was found to have leukocytosis. ID consulted for suspected UTI.     Past Medical History:        Diagnosis Date    Arthritis     AVB (atrioventricular block) 6/20/2012    CAD (coronary artery disease)     Cancer (HCC)     colon treated with surgery 1990's    Chronic kidney disease, stage 3 (HCC)     begining of stage 3    Complicated UTI (urinary tract infection) 10/8/2017    Female bladder prolapse     for OR 9-25-20     GERD (gastroesophageal reflux disease)     [place] (hard of hearing)     Hypertension     uncontrolled BP    Hypokalemia 11/27/2018    Psoriasis     Symptomatic bradycardia      Past Surgical History:        Procedure Laterality Date    APPENDECTOMY      BACK SURGERY      CARDIAC CATHETERIZATION  02/10/2016    Dr. Alva Ayala single vessel disease, no stents placed     CHOLECYSTECTOMY      COLECTOMY      1990's     COLONOSCOPY      CYSTOSCOPY N/A 09/25/2020    CYSTOSCOPY SUPRAPUBIC TUBE INSERTION performed by Daniel Bravo Memo,  at [facility]    ECHO COMPLETE  06/20/2012         ENDOSCOPY, COLON, DIAGNOSTIC      HYSTERECTOMY (CERVIX STATUS UNKNOWN)      JOINT REPLACEMENT      left hip 2012    KYPHOSIS SURGERY      PACEMAKER CHANGE Left 11/14/2022    [employer] PPM-GR  (Dr. Patrice Celestin)    PACEMAKER PLACEMENT  06/20/2012    [employer] dual chamber - Dr. Valerie Sicard Right 10/15/2019    RIGHT HIP TOTAL ARTHROPLASTY performed by Suzanne Juarez MD [place] at [facility] OR     Current Medications:   Scheduled Meds:   cefTRIAXone (ROCEPHIN) IV  2,000 mg IntraVENous Q24H    vitamin D  2,000 Units Oral Daily    losartan  50 mg Oral Daily    triamterene-hydroCHLOROthiazide  1 tablet Oral QAM    apixaban  5 mg Oral BID    metoprolol succinate  25 mg Oral Daily    flecainide  50 mg Oral 2 times per day     Continuous Infusions:  PRN Meds:acetaminophen    Allergies:  Codeine, Amlodipine, Augmentin [amoxicillin-pot clavulanate], Bactrim, Benadryl [diphenhydramine], Brovana [arformoterol], Cardizem [diltiazem hcl], Doxazosin, Ipratropium, Macrodantin [nitrofurantoin macrocrystal], and Verapamil    Social History:   Social History     Socioeconomic History    Marital status:      Spouse name: None    Number of children: 2    Years of education: None    Highest education level: None   Tobacco Use    Smoking status: Former     Packs/day: 1.00     Years: 17.00     Pack years: 17.00     Types: Cigarettes     Start date: 1965     Quit date: 1982     Years since quittin.5    Smokeless tobacco: Never   Vaping Use    Vaping Use: Never used   Substance and Sexual Activity    Alcohol use: No    Drug use: No    Sexual activity: Never     Tobacco: No  Alcohol: No  Pets: No  Travel: No    Family History:       Problem Relation Age of Onset    Heart Disease Mother     Heart Attack Mother     Other Father     Asthma Father    . Otherwise non-pertinent to the chief complaint. REVIEW OF SYSTEMS:    CONSTITUTIONAL:  No chills, fevers or night sweats. No loss of weight. EYES:  No double vision or drainage from eyes, ears or throat. HEENT:  No neck stiffness. No dysphagia. No drainage from eyes, ears or throat  RESPIRATORY:  No cough, productive sputum or hemoptysis. CARDIOVASCULAR:  No chest pain, palpitations, orthopnea or dyspnea on exertion.   GASTROINTESTINAL:  No nausea, vomiting, diarrhea or constipation or hematochezia   GENITOURINARY:  No frequency burning dysuria or hematuria. INTEGUMENT/BREAST:  No rash or breast masses. HEMATOLOGIC/LYMPHATIC:  No lymphadenopathy or blood dyscrasics. ALLERGIC/IMMUNOLOGIC:  No anaphylaxis. ENDOCRINE:  No polyuria or polydipsia or temperature intolerance. MUSCULOSKELETAL:  No myalgia or arthralgia. Full ROM. NEUROLOGICAL:  No focal motor sensory deficit. BEHAVIOR/PSYCH:  No psychosis. PHYSICAL EXAM:    Vitals:    BP (!) 99/57   Pulse 65   Temp 97.1 °F (36.2 °C) (Temporal)   Resp 16   Ht 5' 3\" (1.6 m)   Wt 150 lb (68 kg)   SpO2 93%   BMI 26.57 kg/m²   Constitutional: The patient is awake, alert, and oriented. Skin: Warm and dry. No rashes were noted. No jaundice. HEENT: Eyes show round, and reactive pupils. Moist mucous membranes, no ulcerations, no thrush. Neck: Supple to movements. No lymphadenopathy. Chest: No use of accessory muscles to breathe. Symmetrical expansion. Auscultation reveals no wheezing, crackles, or rhonchi. Cardiovascular: S1 and S2 are rhythmic and regular. No murmurs appreciated. Abdomen: Positive bowel sounds to auscultation. Benign to palpation. No masses felt. No hepatosplenomegaly. Genitourinary: Suprapubic tenderness noted  Extremities: No clubbing, no cyanosis, no edema. Musculoskeletal: No pain in range of motion of joints  Neurological: No focal neurodeficit.   Lines: peripheral      CBC+dif:  Recent Labs     12/19/22  1548 12/20/22  0502 12/21/22  0552   WBC 17.0* 19.0* 14.4*   HGB 14.8 13.8 13.3   HCT 45.1 42.6 41.4   MCV 84.6 87.3 87.5    291 300     Lab Results   Component Value Date    CRP 2.0 (H) 07/25/2018     No results found for: CRP  Lab Results   Component Value Date    SEDRATE 21 (H) 07/25/2018     Lab Results   Component Value Date    ALT 26 12/21/2022    AST 38 (H) 12/21/2022    ALKPHOS 86 12/21/2022    BILITOT 0.5 12/21/2022     Lab Results   Component Value Date/Time     12/21/2022 05:52 AM    K 4.1 12/21/2022 05:52 AM    K 4.0 07/10/2021 12:23 PM     12/21/2022 05:52 AM    CO2 23 12/21/2022 05:52 AM    BUN 24 12/21/2022 05:52 AM    CREATININE 1.1 12/21/2022 05:52 AM    GFRAA >60 08/18/2022 09:50 AM    LABGLOM 48 12/21/2022 05:52 AM    GLUCOSE 109 12/21/2022 05:52 AM    PROT 6.6 12/21/2022 05:52 AM    LABALBU 3.7 12/21/2022 05:52 AM    CALCIUM 9.4 12/21/2022 05:52 AM    BILITOT 0.5 12/21/2022 05:52 AM    ALKPHOS 86 12/21/2022 05:52 AM    AST 38 12/21/2022 05:52 AM    ALT 26 12/21/2022 05:52 AM       Lab Results   Component Value Date/Time    PROTIME 12.6 12/19/2022 05:02 PM    PROTIME 17.3 05/13/2012 06:19 AM    INR 1.2 12/19/2022 05:02 PM       Lab Results   Component Value Date/Time    TSH 2.010 12/19/2022 03:48 PM       Lab Results   Component Value Date/Time    COLORU Yellow 12/19/2022 05:02 PM    PHUR 6.0 12/19/2022 05:02 PM    WBCUA 5-10 12/19/2022 05:02 PM    RBCUA 0-1 12/19/2022 05:02 PM    RBCUA 0-1 07/10/2013 08:05 PM    YEAST FEW 10/02/2019 08:39 PM    BACTERIA MANY 12/19/2022 05:02 PM    CLARITYU SL CLOUDY 12/19/2022 05:02 PM    SPECGRAV 1.015 12/19/2022 05:02 PM    LEUKOCYTESUR MODERATE 12/19/2022 05:02 PM    UROBILINOGEN 0.2 12/19/2022 05:02 PM    BILIRUBINUR Negative 12/19/2022 05:02 PM    BLOODU TRACE-INTACT 12/19/2022 05:02 PM    GLUCOSEU Negative 12/19/2022 05:02 PM    AMORPHOUS FEW 07/10/2021 04:12 PM       No results found for: MPJ7SUS, BEART, J5RJXTJY, PHART, THGBART, HON4KJX, PO2ART, AZH3CFK  Radiology:  NM Cardiac Stress Test Nuclear Imaging   Final Result   1: No definite evidence of ischemia or scar except soft tissue   attenuation. 2  Normal left ventricular wall motion with estimated left ventricular   ejection fraction of >75%. .   3:  Low dose CT attenuation correction protocol was utilized which   revealed Mild-to-moderate coronary artery calcifications.      4:  Please see separate report for EKG and hemodynamic aspect of the   stress test     5: RISK SCAN:  Low risk scan                       XR CHEST (2 VW) Final Result   No acute process. No significant change compared to prior exam including cardiomegaly. Microbiology:  Pending  Recent Labs     12/20/22  0502   BC 24 Hours no growth  24 Hours no growth     Recent Labs     12/19/22  1702   ORG Gram negative yash*     No results for input(s): Yumiko Flaming in the last 72 hours. No results for input(s): STREPNEUMAGU in the last 72 hours. No results for input(s): LP1UAG in the last 72 hours. No results for input(s): ASO in the last 72 hours. No results for input(s): CULTRESP in the last 72 hours. Assessment:  Gram-negative rods UTI  Previously grew Hafnia and E. coli which is pansensitive    Plan:    Continue ceftriaxone  Follow urine cultures  Patient has suprapubic pain suggestive of UTI with leukocytosis. Will continue to follow. Thank you for having us see this patient in consultation. I will be discussing this case with the treating physicians.       Electronically signed by Truong Harris MD on 12/21/2022 at 6:55 PM

## 2022-12-22 LAB
ALBUMIN SERPL-MCNC: 3.8 G/DL (ref 3.5–5.2)
ALP BLD-CCNC: 87 U/L (ref 35–104)
ALT SERPL-CCNC: 23 U/L (ref 0–32)
ANION GAP SERPL CALCULATED.3IONS-SCNC: 14 MMOL/L (ref 7–16)
AST SERPL-CCNC: 30 U/L (ref 0–31)
BILIRUB SERPL-MCNC: 0.5 MG/DL (ref 0–1.2)
BUN BLDV-MCNC: 22 MG/DL (ref 6–23)
CALCIUM SERPL-MCNC: 9.5 MG/DL (ref 8.6–10.2)
CHLORIDE BLD-SCNC: 103 MMOL/L (ref 98–107)
CO2: 21 MMOL/L (ref 22–29)
CREAT SERPL-MCNC: 1.1 MG/DL (ref 0.5–1)
GFR SERPL CREATININE-BSD FRML MDRD: 48 ML/MIN/1.73
GLUCOSE BLD-MCNC: 114 MG/DL (ref 74–99)
HCT VFR BLD CALC: 41.9 % (ref 34–48)
HEMOGLOBIN: 13.6 G/DL (ref 11.5–15.5)
MCH RBC QN AUTO: 28.1 PG (ref 26–35)
MCHC RBC AUTO-ENTMCNC: 32.5 % (ref 32–34.5)
MCV RBC AUTO: 86.6 FL (ref 80–99.9)
ORGANISM: ABNORMAL
PDW BLD-RTO: 13.3 FL (ref 11.5–15)
PLATELET # BLD: 311 E9/L (ref 130–450)
PMV BLD AUTO: 10.2 FL (ref 7–12)
POTASSIUM SERPL-SCNC: 3.8 MMOL/L (ref 3.5–5)
RBC # BLD: 4.84 E12/L (ref 3.5–5.5)
SODIUM BLD-SCNC: 138 MMOL/L (ref 132–146)
TOTAL PROTEIN: 6.5 G/DL (ref 6.4–8.3)
URINE CULTURE, ROUTINE: ABNORMAL
WBC # BLD: 14.9 E9/L (ref 4.5–11.5)

## 2022-12-22 PROCEDURE — 2140000000 HC CCU INTERMEDIATE R&B

## 2022-12-22 PROCEDURE — 6370000000 HC RX 637 (ALT 250 FOR IP): Performed by: NURSE PRACTITIONER

## 2022-12-22 PROCEDURE — 6370000000 HC RX 637 (ALT 250 FOR IP): Performed by: INTERNAL MEDICINE

## 2022-12-22 PROCEDURE — 6360000002 HC RX W HCPCS: Performed by: INTERNAL MEDICINE

## 2022-12-22 PROCEDURE — 2580000003 HC RX 258: Performed by: INTERNAL MEDICINE

## 2022-12-22 PROCEDURE — 80053 COMPREHEN METABOLIC PANEL: CPT

## 2022-12-22 PROCEDURE — 85027 COMPLETE CBC AUTOMATED: CPT

## 2022-12-22 PROCEDURE — 99233 SBSQ HOSP IP/OBS HIGH 50: CPT | Performed by: INTERNAL MEDICINE

## 2022-12-22 PROCEDURE — 36415 COLL VENOUS BLD VENIPUNCTURE: CPT

## 2022-12-22 RX ORDER — FLECAINIDE ACETATE 50 MG/1
50 TABLET ORAL EVERY 12 HOURS SCHEDULED
Qty: 60 TABLET | Refills: 3 | Status: SHIPPED | OUTPATIENT
Start: 2022-12-22 | End: 2022-12-25 | Stop reason: SDUPTHER

## 2022-12-22 RX ORDER — METOPROLOL SUCCINATE 25 MG/1
25 TABLET, EXTENDED RELEASE ORAL DAILY
Qty: 30 TABLET | Refills: 3 | Status: SHIPPED | OUTPATIENT
Start: 2022-12-23 | End: 2022-12-25 | Stop reason: SDUPTHER

## 2022-12-22 RX ADMIN — Medication 2000 UNITS: at 09:07

## 2022-12-22 RX ADMIN — APIXABAN 5 MG: 5 TABLET, FILM COATED ORAL at 09:06

## 2022-12-22 RX ADMIN — LOSARTAN POTASSIUM 50 MG: 50 TABLET, FILM COATED ORAL at 09:06

## 2022-12-22 RX ADMIN — FLECAINIDE ACETATE 50 MG: 50 TABLET ORAL at 20:48

## 2022-12-22 RX ADMIN — ACETAMINOPHEN 500 MG: 500 TABLET, FILM COATED ORAL at 20:48

## 2022-12-22 RX ADMIN — ACETAMINOPHEN 500 MG: 500 TABLET, FILM COATED ORAL at 12:16

## 2022-12-22 RX ADMIN — APIXABAN 5 MG: 5 TABLET, FILM COATED ORAL at 20:48

## 2022-12-22 RX ADMIN — FLECAINIDE ACETATE 50 MG: 50 TABLET ORAL at 09:08

## 2022-12-22 RX ADMIN — CEFTRIAXONE 2000 MG: 2 INJECTION, POWDER, FOR SOLUTION INTRAMUSCULAR; INTRAVENOUS at 14:31

## 2022-12-22 RX ADMIN — TRIAMTERENE AND HYDROCHLOROTHIAZIDE 1 TABLET: 37.5; 25 TABLET ORAL at 09:07

## 2022-12-22 RX ADMIN — METOPROLOL SUCCINATE 25 MG: 25 TABLET, EXTENDED RELEASE ORAL at 09:07

## 2022-12-22 ASSESSMENT — PAIN DESCRIPTION - LOCATION
LOCATION: BACK
LOCATION: NECK

## 2022-12-22 ASSESSMENT — PAIN DESCRIPTION - ORIENTATION
ORIENTATION: LEFT
ORIENTATION: RIGHT;LEFT;MID

## 2022-12-22 ASSESSMENT — PAIN SCALES - GENERAL
PAINLEVEL_OUTOF10: 8
PAINLEVEL_OUTOF10: 6
PAINLEVEL_OUTOF10: 0
PAINLEVEL_OUTOF10: 0
PAINLEVEL_OUTOF10: 5
PAINLEVEL_OUTOF10: 0
PAINLEVEL_OUTOF10: 6

## 2022-12-22 ASSESSMENT — PAIN SCALES - WONG BAKER: WONGBAKER_NUMERICALRESPONSE: 0

## 2022-12-22 ASSESSMENT — PAIN DESCRIPTION - DESCRIPTORS: DESCRIPTORS: ACHING;SORE;TENDER;SPASM

## 2022-12-22 NOTE — PROGRESS NOTES
Definitely better today  Feels stronger and less dizzy  Afebrile and hemodynamically stable  RRR  Lungs clear  Abdomen soft without tenderness  No peripheral edema  Continue present regimen

## 2022-12-22 NOTE — PROGRESS NOTES
[facility],[address] and [facility] Electrophysiology  Inpatient Progress Report  PATIENT: [facility] RECORD NUMBER: [id number]  DATE OF SERVICE:  12/22/2022  ATTENDING ELECTROPHYSIOLOGIST: Raquel Donovan MD   PRIMARY ELECTROPHYSIOLOGIST: Raquel Donovan MD   REFERRING PHYSICIAN: No ref. provider found and Donavan Hernandez MD  CHIEF COMPLAINT: Dizziness, Shortness of breath and back pain. HPI: The patient is a 80year-old lady with significant past medical history of history paroxysmal atrial tachycardia, hypertension, COPD, second degree AV block Mobitz type II, history of near syncope and status post dual-chamber [employer] PPM implanted 06/20/2012 with PGR 11/14/2022. She was previously prescribed Eliquis however she complained of dizziness and stopped this medication by her own. She states that she went to see her PCP, Dr. Josy Gaming, where she complained of shortness of breath, dizziness, and back pain. She was found to have be in tachycardia and was referred to [facility] ([facility] ED. In ED shew was noted to be in atrial tachycardia at 143 bpm. She was started on IV Cardizem, drip which was discontinued due to hypotension. Her device interrogation revealed multiple episodes of PAT on 12/19/2022 with a V rates into the 150s. She was also found to have a positive UTI for Nitrate and leukocyte and was treated with Rocephin. The patient denies chest pain, palpitations, syncope, PND, or orthopnea. 12/21/2022: The patient remains in sinus and is A paced, without recurrent AT she has been started on Flecainide/Toprol/Eliquis and is tolerating this will. Her Stress test was low risk yesterday. 12/22/2022: The patient seen in hospital follow-up, she remains in sinus without recurrent SVT is tolerating flecainide/Toprol/Eliquis at this time without complaints nor associated hypotension.       Patient Active Problem List    Diagnosis Date Noted    Pacemaker 06/27/2012 Priority: High     Overview Note:     A.  Dual chamber [employer] Odin [employer] DR MATHIS [id number] implantation 12      SVT (supraventricular tachycardia) ([facility] 2022     Priority: Medium    Elective replacement indicated for cardiac pacemaker battery at end of lifespan 2022     Priority: Medium    Hypotension 2020    Neurogenic bladder 2020    Cystitis 2020    Respiratory distress 2020    Primary osteoarthritis of right hip 10/15/2019    Sepsis ([facility] 05/10/2019    Essential hypertension 2019    COPD (chronic obstructive pulmonary disease) ([facility] 2017    Atrial tachycardia (HCC)     Orthostatic hypotension        Past Medical History:   Diagnosis Date    Arthritis     AVB (atrioventricular block) 2012    CAD (coronary artery disease)     Cancer (HCC)     colon treated with surgery     Chronic kidney disease, stage 3 ([facility]     begining of stage 3    Complicated UTI (urinary tract infection) 10/8/2017    Female bladder prolapse     for OR 20     GERD (gastroesophageal reflux disease)     [place] (hard of hearing)     Hypertension     uncontrolled BP    Hypokalemia 2018    Psoriasis     Symptomatic bradycardia        Family History   Problem Relation Age of Onset    Heart Disease Mother     Heart Attack Mother     Other Father     Asthma Father        Social History     Tobacco Use    Smoking status: Former     Packs/day: 1.00     Years: 17.00     Pack years: 17.00     Types: Cigarettes     Start date: 1965     Quit date: 1982     Years since quittin.5    Smokeless tobacco: Never   Substance Use Topics    Alcohol use: No       Current Facility-Administered Medications   Medication Dose Route Frequency Provider Last Rate Last Admin    cefTRIAXone (ROCEPHIN) 2,000 mg in sterile water 20 mL IV syringe  2,000 mg IntraVENous Q24H Placido Crane MD   2,000 mg at 22 1558    acetaminophen (TYLENOL) tablet 500 mg  500 mg Oral Q6H PRN Meaghan Cintron MD   500 mg at 12/22/22 1216    vitamin D (CHOLECALCIFEROL) tablet 2,000 Units  2,000 Units Oral Daily Qasim Stephen MD   2,000 Units at 12/22/22 8534    losartan (COZAAR) tablet 50 mg  50 mg Oral Daily Qasim Stephen MD   50 mg at 12/22/22 7457    triamterene-hydroCHLOROthiazide (MAXZIDE-25) 37.5-25 MG per tablet 1 tablet  1 tablet Oral QAM Qasim Stephen MD   1 tablet at 12/22/22 2586    apixaban (ELIQUIS) tablet 5 mg  5 mg Oral BID Aminah Minium, APRN - CNP   5 mg at 12/22/22 2659    metoprolol succinate (TOPROL XL) extended release tablet 25 mg  25 mg Oral Daily Aminah Minium, APRN - CNP   25 mg at 12/22/22 5925    flecainide (TAMBOCOR) tablet 50 mg  50 mg Oral 2 times per day Emeterio Moulton MD   50 mg at 12/22/22 0908        Allergies   Allergen Reactions    Codeine Other (See Comments)     headache    Amlodipine Nausea And Vomiting    Augmentin [Amoxicillin-Pot Clavulanate] Nausea And Vomiting    Bactrim Nausea And Vomiting    Benadryl [Diphenhydramine] Nausea And Vomiting    Brovana [Arformoterol] Other (See Comments)     Exacerbates problems      Cardizem [Diltiazem Hcl] Other (See Comments)     Turned her into zombie    Doxazosin     Ipratropium Other (See Comments)     Exacerbates problems      Macrodantin [Nitrofurantoin Macrocrystal] Nausea And Vomiting    Verapamil Other (See Comments)     Turns her into \"zombie\"         ROS:   Constitutional: Negative for fever, activity change and appetite change. HENT: Negative for epistaxis. Eyes: Negative for diploplia, blurred vision. Respiratory: Negative for cough, chest tightness, shortness of breath and wheezing. Cardiovascular: pertinent positives in HPI  Gastrointestinal: Negative for abdominal pain and blood in stool.    All other review of systems are negative     PHYSICAL EXAM:   Vitals:    12/22/22 0908 12/22/22 1056 12/22/22 1127 12/22/22 1200   BP: (!) 123/58 (!) 123/58 (!) 109/55 120/64   Pulse:  61 66 71 Resp:   18 18   Temp:   97 °F (36.1 °C) 97.8 °F (36.6 °C)   TempSrc:   Temporal Temporal   SpO2:   94%    Weight:       Height:          Constitutional: Well-developed, no acute distress  Eyes: conjunctivae normal, no xanthelasma   Ears, Nose, Throat: oral mucosa moist, no cyanosis   CV: no JVD. Regular rate and rhythm. Normal S1S2 and no S3. No murmurs, rubs, or gallops. PMI is nondisplaced  Lungs: clear to auscultation bilaterally, normal respiratory effort without used of accessory muscles  Abdomen: soft, non-tender, bowel sounds present, no masses or hepatomegaly   Musculoskeletal: no digital clubbing, no edema   Skin: warm, no rashes   Pacemaker site: well healed. No erosion, infection or migration    I have personally reviewed the laboratory, cardiac diagnostic and radiographic testing as outlined below:    Data:    Recent Labs     12/20/22  0502 12/21/22  0552 12/22/22  0530   WBC 19.0* 14.4* 14.9*   HGB 13.8 13.3 13.6   HCT 42.6 41.4 41.9    300 311     Recent Labs     12/20/22  0502 12/21/22  0552 12/22/22  0530    138 138   K 4.4 4.1 3.8    104 103   CO2 20* 23 21*   BUN 21 24* 22   CREATININE 0.9 1.1* 1.1*   CALCIUM 8.9 9.4 9.5      Lab Results   Component Value Date/Time    MG 2.0 12/19/2022 03:48 PM     Recent Labs     12/19/22  1548   TSH 2.010     Recent Labs     12/19/22  1702   INR 1.2       Telemetry: AP-VS no recurrent SVT. EKG 12/20/22: Atrial tachycardia at 143 bpm, low voltage, possible IMI Please see scan in Cardiology. Echocardiogram 09/27/22: Findings      Left Ventricle   Left ventricular size is grossly normal.   Ejection fraction is visually estimated at 60%. No regional wall motion abnormalities seen. There is doppler evidence of stage II diastolic dysfunction. Right Ventricle   Normal right ventricular size and function. Left Atrium   Normal sized left atrium. Right Atrium   Normal right atrium size.       Mitral Valve   No evidence of regurgitation is present. Nuclear stress test 12/20/22:  1: No definite evidence of ischemia or scar except soft tissue   attenuation. 2  Normal left ventricular wall motion with estimated left ventricular   ejection fraction of >75%. .   3:  Low dose CT attenuation correction protocol was utilized which   revealed Mild-to-moderate coronary artery calcifications. 4:  Please see separate report for EKG and hemodynamic aspect of the   stress test     5: RISK SCAN:  Low risk scan                   Cardiac Catheterization 02/10/16:   PROCEDURE DATE:  02/10/2016         PROCEDURES:   1. Left heart catheterization   2. Coronary angiography   3. Left ventriculography   4. Perclose vascular closure device      INDICATION FOR PROCEDURE: Chest pain. AUC score 9 indication _3       TECHNIQUE:  The right groin was used for vascular access. Appropriate   puncture site was confirmed by micropuncture. A 6-St Helenian sheath was placed   with no difficulty. Coronary angiography and left ventriculography were   performed with standard Araseli catheters. Perclose closure device was   utilized with good success. There were no complications. HEMODYNAMIC DATA:   /70. [id number]. . LVED 7. No gradient across the aortic valve on pullback. VENTRICULOGRAPHY:   Single plane ventriculography in the 30-degree ABEBE view demonstrates a left   ventricle of normal dimension and contractility with an estimated ejection   fraction of65% to 70%. There is no  significant mitral insufficiency. CORONARY ANGIOGRAPHY:   The right coronary artery is a large and dominant vessel. It gives rise to a   huge acute marginal branch from its mid portion, which gives septal    branch to the distal inferior septum. Diffuse, moderate luminal   irregularities are noted in the RCA territory with no area of stenosis   exceeding 20%.       The left main coronary is a large and long vessel that is free of disease. The left anterior descending artery is a large vessel that gives rise to a   large diagonal branch from its proximal segment. The LAD itself stops at the   apex. The vessels are tortuous consistent with a hypertrophied ventricle. There is no evidence of atherosclerosis. The left circumflex system is large giving rise to a single obtuse marginal   branch of significance. There is no perceptible atherosclerosis. IMPRESSION:   1. Mild single-vessel coronary atherosclerosis   2. Hyperdynamic left ventricular systolic function               Dictated by:  Gaudencio Garcia M.D. Device Interrogation 12/19/22:  Make/Model STJ ASSURTIY   Mode DDDR 60/120  P wave: 1.9 mV  Impedance: 400 ohms   Threshold: 0.5 V @ 0.5 ms  RV R wave: 2.8 mV  Impedance: 150 ohms   Threshold: 1.625 V @ 0.5 ms  Pacing: A: 22%  RV: <1%    Battery Voltage/Longevity:  11.6 years     Arrhythmias: Multiple episodes of atrial tachycardia, lasting anywhere from 20seconds to 11 minutes the rate typically in the 150s. Reprogramming included none  Overall device function is normal  All device programmable settings were evaluated per above and in the scanned document, along with iterative adjustments (capture thresholds) to assess and select the most appropriate final programming to provide for consistent delivery of the appropriate therapy and to verify function of the device. I have independently reviewed all of the ECGs and rhythm strips per above     Assessment/Plan:     1. Paroxysmal atrial tachycardia and atrial flutter  - Intolerant to calcium channel blockers  - Avoided BB due to severe COPD in the past; but  was on Toprol XL 25 mg QD however stopped prior to admission.   - Episode of atrial flutter with mode switch on device check 8/18/22, lasted about 20 minutes. Patient was symptomatic.    - Started on Eliquis 8/18/22 however she discontinued due to complaints of dizziness.   - [id number] score of at least 4 (hypertension, age, female)  - Recurrent PAT with associated high ventricular rates  12/19/2022 in the setting of a UTI. - Placed on Flecainide 12/20/2022     2. Dual chamber [employer] Odin pacemaker  - Implanted 6/20/12  - Indication: second degree AV block Mobitz Type ll and history of near syncope   - Pulse generator change on 11/14/22. - Chronically low sensing and impedance of RV lead with stable threshold. 3.  History of orthostatic hypotension     4. Hypertension  - On Maxide and Avapro     5. COPD    Recommendations:  Continue Flecainide 50mg BID. Continue Toprol 25mg daily. Continue Apixaban 5mg BID. If hypotension becomes a problem consider stopping or reducing Cozaar or Maxize   She will need an EKG in one week to assess for QRS duration. Follow-up with Dr. Gavino Velásquez or a provider in one month. Ep to sign off please call if needed. I have spent a total of 10  minutes with the patient and the family reviewing the above stated recommendations. And a total of >50% of that time involved face-to-face time providing counseling and or coordination of care with the other providers. Thank you for allowing me to participate in your patient's care. Please call me if there are any questions or concerns. Discussed with Dr. Gavino Velásquez. AYAN Trevizo - CNP  Cardiac Electrophysiology  [facility]) Physicians  [facility] [place]: [facility]  12:56 PM  12/22/2022    Attending Physician's Statement    Patient seen with the ANP. Agree with the findings, assessment and plan. Management plan was discussed. I have personally interviewed the patient, independently performed a focused cardiac examination, reviewed the pertinent laboratory and diagnostic testing with the patient and directly participated in the medical decision-making as noted above with the following additions:     Feeling well. Want to go home. No recurrent AT/AFL overnight.     Physical exam showed no JVD, RRR, normal S1S2, no murmur, clear lungs bilaterally, trace edema    Continue Flecainide 50 mg bid. Continue Toprol XL 25 mg daily. Continue Eliquis 5 mg bid. OK to discharge home per EP perspective and follow up in 1 week for EKG. EP will sign off. Please call for any questions or concerns. I have spent a total of 35 minutes with the patient and the family reviewing the above stated recommendations. And a total of >50% of that time involved face-to-face time providing counseling and/or coordination of care with the other providers, reviewing records/tests, counseling/education of the patient, ordering medications/tests/procedures, coordinating care, and documenting clinical information in the EHR.      Bonny Hoffman MD  Cardiac Electrophysiology  [address]  [facility] [place]: [place] [facility]  8:54 PM  12/22/2022

## 2022-12-22 NOTE — PROGRESS NOTES
[address] [place] [address] [facility]  Progress Note      Chief Complaint   Patient presents with    Tachycardia     Was sent by Dr. Blair Rodarte for high heart rate. C/O dizziness and being lightheaded for a month. SUBJECTIVE:  80-year-old female with past medical history of paroxysmal atrial tachycardia, hypertension, COPD, type 2 AV block, history of recurrent near syncope, status post dual chamber pacemaker, status post suprapubic catheter, who had stopped her Eliquis on her own because of dizziness presented with A. fib RVR. She was found to have leukocytosis. ID consulted for suspected UTI. Patient is tolerating medications. No reported adverse drug reactions. No nausea, vomiting, diarrhea. Review of systems:  As stated above in the chief complaint, otherwise negative. Medications:  Scheduled Meds:   cefTRIAXone (ROCEPHIN) IV  2,000 mg IntraVENous Q24H    vitamin D  2,000 Units Oral Daily    losartan  50 mg Oral Daily    triamterene-hydroCHLOROthiazide  1 tablet Oral QAM    apixaban  5 mg Oral BID    metoprolol succinate  25 mg Oral Daily    flecainide  50 mg Oral 2 times per day     Continuous Infusions:  PRN Meds:acetaminophen    OBJECTIVE:  /64   Pulse 71   Temp 97.8 °F (36.6 °C) (Temporal)   Resp 18   Ht 5' 3\" (1.6 m)   Wt 150 lb (68 kg)   SpO2 94%   BMI 26.57 kg/m²   Temp  Av.3 °F (36.3 °C)  Min: 96.3 °F (35.7 °C)  Max: 97.9 °F (36.6 °C)  Constitutional: The patient is awake, alert, and oriented. Skin: Warm and dry. No rashes were noted. No jaundice. HEENT: Eyes show round, and reactive pupils. Moist mucous membranes, no ulcerations, no thrush. Neck: Supple to movements. No lymphadenopathy. Chest: No use of accessory muscles to breathe. Symmetrical expansion. Auscultation reveals no wheezing, crackles, or rhonchi. Cardiovascular: S1 and S2 are rhythmic and regular. No murmurs appreciated. Abdomen: Positive bowel sounds to auscultation.  Benign to palpation. No masses felt. No hepatosplenomegaly. Genitourinary: Suprapubic tenderness noted  Extremities: No clubbing, no cyanosis, no edema. Musculoskeletal: No pain in range of motion of joints  Neurological: No focal neurodeficit.   Lines: peripheral    Laboratory and Tests Review:  Lab Results   Component Value Date    WBC 14.9 (H) 12/22/2022    WBC 14.4 (H) 12/21/2022    WBC 19.0 (H) 12/20/2022    HGB 13.6 12/22/2022    HCT 41.9 12/22/2022    MCV 86.6 12/22/2022     12/22/2022     Lab Results   Component Value Date    NEUTROABS 11.11 (H) 11/14/2022    NEUTROABS 3.79 07/25/2021    NEUTROABS 3.98 07/10/2021     No results found for: [facility]  Lab Results   Component Value Date    ALT 23 12/22/2022    AST 30 12/22/2022    ALKPHOS 87 12/22/2022    BILITOT 0.5 12/22/2022     Lab Results   Component Value Date/Time     12/22/2022 05:30 AM    K 3.8 12/22/2022 05:30 AM    K 4.0 07/10/2021 12:23 PM     12/22/2022 05:30 AM    CO2 21 12/22/2022 05:30 AM    BUN 22 12/22/2022 05:30 AM    CREATININE 1.1 12/22/2022 05:30 AM    CREATININE 1.1 12/21/2022 05:52 AM    CREATININE 0.9 12/20/2022 05:02 AM    GFRAA >60 08/18/2022 09:50 AM    LABGLOM 48 12/22/2022 05:30 AM    GLUCOSE 114 12/22/2022 05:30 AM    PROT 6.5 12/22/2022 05:30 AM    LABALBU 3.8 12/22/2022 05:30 AM    CALCIUM 9.5 12/22/2022 05:30 AM    BILITOT 0.5 12/22/2022 05:30 AM    ALKPHOS 87 12/22/2022 05:30 AM    AST 30 12/22/2022 05:30 AM    ALT 23 12/22/2022 05:30 AM     Lab Results   Component Value Date    CRP 2.0 (H) 07/25/2018     Lab Results   Component Value Date    SEDRATE 21 (H) 07/25/2018     Radiology:      Microbiology:   Lab Results   Component Value Date/Time    BC 24 Hours no growth 12/20/2022 05:02 AM    BC 24 Hours no growth 12/20/2022 05:02 AM    BC 5 Days- no growth 02/06/2020 06:58 AM    ORG Escherichia coli 12/19/2022 05:02 PM    ORG Hafnia species 07/25/2021 02:05 AM    ORG Hafnia species 07/10/2021 05:00 PM     Lab Results Component Value Date/Time    BLOODCULT2 5 Days- no growth 02/06/2020 07:00 AM    BLOODCULT2  05/10/2019 04:21 PM     Susceptibility testing for this isolate is restricted to  normally sterile sources and is a reference laboratory  referral.  Please contact the laboratory to discuss further testing  options if clinically indicated. BLOODCULT2 5 Days- no growth 10/06/2017 08:25 PM    ORG Escherichia coli 12/19/2022 05:02 PM    ORG Hafnia species 07/25/2021 02:05 AM    ORG Hafnia species 07/10/2021 05:00 PM     No results found for: WNDABS  No results found for: RESPSMEAR  No results found for: MPNEUMO, CLAMYDCU, LABLEGI, AFBCX, FUNGSM, LABFUNG  No results found for: CULTRESP  No results found for: CXCATHTIP  No results found for: BFCS  No results found for: CXSURG  Urine Culture, Routine   Date Value Ref Range Status   12/19/2022 >100,000 CFU/ml  Final   07/25/2021 >100,000 CFU/ml  Hafnia species is  paralvei    Final   07/10/2021 >100,000 CFU/ml  Final     No results found for: MRSAC    Assessment:  Ecoli UTI  Previously grew Hafnia and E. coli which is pansensitive     Plan:    Continue ceftriaxone until 12/25 then dc with oral cefdinir. Follow urine cultures  Patient has suprapubic pain suggestive of UTI with leukocytosis. Will continue to follow.     David Soto MD  4:55 PM  12/22/2022

## 2022-12-22 NOTE — PATIENT CARE CONFERENCE
P Quality Flow/Interdisciplinary Rounds Progress Note        Quality Flow Rounds held on December 22, 2022    Disciplines Attending:  Bedside Nurse, , and Nursing Unit Leadership    Laquita Gaitan was admitted on 12/19/2022  3:07 PM    Anticipated Discharge Date:       Disposition:    Justino Score:  Justino Scale Score: 18    Readmission Risk              Risk of Unplanned Readmission:  10           Discussed patient goal for the day, patient clinical progression, and barriers to discharge.   The following Goal(s) of the Day/Commitment(s) have been identified:  Labs - Report Results      Amaya Rodriguez RN  December 22, 2022

## 2022-12-22 NOTE — PLAN OF CARE
Problem: Pain  Goal: Verbalizes/displays adequate comfort level or baseline comfort level  12/21/2022 2347 by Denise Ley RN  Outcome: Progressing  12/21/2022 1232 by Gaby Valdes RN  Outcome: Progressing     Problem: Safety - Adult  Goal: Free from fall injury  12/21/2022 2347 by Denise Ley RN  Outcome: Dara Hernandez (Taken 12/21/2022 2343)  Free From Fall Injury: Instruct family/caregiver on patient safety  12/21/2022 1232 by Gaby Valdes RN  Outcome: Progressing     Problem: ABCDS Injury Assessment  Goal: Absence of physical injury  12/21/2022 2347 by Denise Ley RN  Outcome: Progressing  Flowsheets (Taken 12/21/2022 2343)  Absence of Physical Injury: Implement safety measures based on patient assessment  12/21/2022 1232 by Gaby Valdes RN  Outcome: Progressing

## 2022-12-22 NOTE — PROGRESS NOTES
Yumiko Served doctor Duane Grimes regarding patients catheter output. He is aware, he saw her this morning and will follow up with her. No orders at this time.

## 2022-12-22 NOTE — PROGRESS NOTES
CLINICAL PHARMACY NOTE: MEDS TO BEDS    Total # of Prescriptions Filled: 3   The following medications were delivered to the patient:  Flecainide acetate 50 mg  Metoprolol succinate 25 mg  Eliquis 5 mg  Delivered to pt @ 4:05p    Additional Documentation:

## 2022-12-23 PROCEDURE — 6370000000 HC RX 637 (ALT 250 FOR IP): Performed by: NURSE PRACTITIONER

## 2022-12-23 PROCEDURE — 6360000002 HC RX W HCPCS: Performed by: INTERNAL MEDICINE

## 2022-12-23 PROCEDURE — 2580000003 HC RX 258: Performed by: INTERNAL MEDICINE

## 2022-12-23 PROCEDURE — 6370000000 HC RX 637 (ALT 250 FOR IP): Performed by: INTERNAL MEDICINE

## 2022-12-23 PROCEDURE — 2140000000 HC CCU INTERMEDIATE R&B

## 2022-12-23 RX ADMIN — APIXABAN 5 MG: 5 TABLET, FILM COATED ORAL at 19:58

## 2022-12-23 RX ADMIN — ACETAMINOPHEN 500 MG: 500 TABLET, FILM COATED ORAL at 19:27

## 2022-12-23 RX ADMIN — TRIAMTERENE AND HYDROCHLOROTHIAZIDE 1 TABLET: 37.5; 25 TABLET ORAL at 08:33

## 2022-12-23 RX ADMIN — ACETAMINOPHEN 500 MG: 500 TABLET, FILM COATED ORAL at 06:03

## 2022-12-23 RX ADMIN — LOSARTAN POTASSIUM 50 MG: 50 TABLET, FILM COATED ORAL at 08:32

## 2022-12-23 RX ADMIN — METOPROLOL SUCCINATE 25 MG: 25 TABLET, EXTENDED RELEASE ORAL at 08:33

## 2022-12-23 RX ADMIN — Medication 2000 UNITS: at 08:32

## 2022-12-23 RX ADMIN — APIXABAN 5 MG: 5 TABLET, FILM COATED ORAL at 08:32

## 2022-12-23 RX ADMIN — FLECAINIDE ACETATE 50 MG: 50 TABLET ORAL at 08:33

## 2022-12-23 RX ADMIN — CEFTRIAXONE 2000 MG: 2 INJECTION, POWDER, FOR SOLUTION INTRAMUSCULAR; INTRAVENOUS at 16:48

## 2022-12-23 RX ADMIN — FLECAINIDE ACETATE 50 MG: 50 TABLET ORAL at 19:58

## 2022-12-23 ASSESSMENT — PAIN SCALES - WONG BAKER: WONGBAKER_NUMERICALRESPONSE: 0

## 2022-12-23 ASSESSMENT — PAIN SCALES - GENERAL
PAINLEVEL_OUTOF10: 0
PAINLEVEL_OUTOF10: 6

## 2022-12-23 ASSESSMENT — PAIN - FUNCTIONAL ASSESSMENT: PAIN_FUNCTIONAL_ASSESSMENT: PREVENTS OR INTERFERES SOME ACTIVE ACTIVITIES AND ADLS

## 2022-12-23 ASSESSMENT — PAIN DESCRIPTION - DESCRIPTORS: DESCRIPTORS: ACHING;SORE;SPASM

## 2022-12-23 ASSESSMENT — PAIN DESCRIPTION - ORIENTATION: ORIENTATION: MID

## 2022-12-23 ASSESSMENT — PAIN DESCRIPTION - LOCATION: LOCATION: BACK

## 2022-12-23 NOTE — PLAN OF CARE
Problem: Discharge Planning  Goal: Discharge to home or other facility with appropriate resources  12/23/2022 0903 by Sloan Brooks RN  Outcome: Progressing  12/23/2022 0041 by Ilsa Homans, RN  Outcome: Progressing

## 2022-12-23 NOTE — PLAN OF CARE
Problem: Discharge Planning  Goal: Discharge to home or other facility with appropriate resources  12/23/2022 0041 by Deven Isaac RN  Outcome: Progressing  12/22/2022 1602 by Silvano Torres RN  Outcome: Progressing     Problem: Pain  Goal: Verbalizes/displays adequate comfort level or baseline comfort level  12/23/2022 0041 by Deven Isaac RN  Outcome: Progressing  12/22/2022 1602 by Silvano Torres RN  Outcome: Progressing     Problem: Safety - Adult  Goal: Free from fall injury  Outcome: Progressing     Problem: ABCDS Injury Assessment  Goal: Absence of physical injury  Outcome: Progressing     Problem: Skin/Tissue Integrity  Goal: Absence of new skin breakdown  Description: 1. Monitor for areas of redness and/or skin breakdown  2. Assess vascular access sites hourly  3. Every 4-6 hours minimum:  Change oxygen saturation probe site  4. Every 4-6 hours:  If on nasal continuous positive airway pressure, respiratory therapy assess nares and determine need for appliance change or resting period.   Outcome: Progressing

## 2022-12-23 NOTE — PATIENT CARE CONFERENCE
P Quality Flow/Interdisciplinary Rounds Progress Note        Quality Flow Rounds held on December 23, 2022    Disciplines Attending:  Bedside Nurse, , , and Nursing Unit [address] Bee[address] Vijay was admitted on 12/19/2022  3:07 PM    Anticipated Discharge Date:       Disposition:    Justino Score:  Justino Scale Score: 19    Readmission Risk              Risk of Unplanned Readmission:  10           Discussed patient goal for the day, patient clinical progression, and barriers to discharge.   The following Goal(s) of the Day/Commitment(s) have been identified:  discharge plan      Efra Barrientos RN  December 23, 2022

## 2022-12-23 NOTE — CARE COORDINATION
Met with pt to discuss discharge planning/transition of care. Pt states she has been up going to bathroom with sba, pt feels a little wobbly, feels she can go home. Discussed hhc for therapy, pt is agreeable, called [address] to notify the need for therapy, will need hhc orders for [facility] and PT/OT. Family to transport home. Melissa Limon, MSW, LSW

## 2022-12-23 NOTE — PROGRESS NOTES
Continues to improve  Denies any specific complaints  Afebrile , vs stable  RRR  Lungs clear  Will need to continue IV ATB until 12-25 as per ID

## 2022-12-23 NOTE — PROGRESS NOTES
[address] [place] [address] [facility]  Progress Note      Chief Complaint   Patient presents with    Tachycardia     Was sent by Dr. Peggy Webster for high heart rate. C/O dizziness and being lightheaded for a month. SUBJECTIVE:  80-year-old female with past medical history of paroxysmal atrial tachycardia, hypertension, COPD, type 2 AV block, history of recurrent near syncope, status post dual chamber pacemaker, status post suprapubic catheter, who had stopped her Eliquis on her own because of dizziness presented with A. fib RVR. She was found to have leukocytosis. ID consulted for suspected UTI. Patient is tolerating medications. No reported adverse drug reactions. No nausea, vomiting, diarrhea. Review of systems:  As stated above in the chief complaint, otherwise negative. Medications:  Scheduled Meds:   cefTRIAXone (ROCEPHIN) IV  2,000 mg IntraVENous Q24H    vitamin D  2,000 Units Oral Daily    losartan  50 mg Oral Daily    triamterene-hydroCHLOROthiazide  1 tablet Oral QAM    apixaban  5 mg Oral BID    metoprolol succinate  25 mg Oral Daily    flecainide  50 mg Oral 2 times per day     Continuous Infusions:  PRN Meds:acetaminophen    OBJECTIVE:  /60   Pulse 61   Temp (!) 96.5 °F (35.8 °C) (Temporal)   Resp 18   Ht 5' 3\" (1.6 m)   Wt 150 lb (68 kg)   SpO2 94%   BMI 26.57 kg/m²   Temp  Av.2 °F (36.2 °C)  Min: 96.5 °F (35.8 °C)  Max: 98 °F (36.7 °C)  Constitutional: The patient is awake, alert, and oriented. Skin: Warm and dry. No rashes were noted. No jaundice. HEENT: Eyes show round, and reactive pupils. Moist mucous membranes, no ulcerations, no thrush. Neck: Supple to movements. No lymphadenopathy. Chest: No use of accessory muscles to breathe. Symmetrical expansion. Auscultation reveals no wheezing, crackles, or rhonchi. Cardiovascular: S1 and S2 are rhythmic and regular. No murmurs appreciated. Abdomen: Positive bowel sounds to auscultation.  Benign to palpation. No masses felt. No hepatosplenomegaly. Genitourinary: Suprapubic tenderness noted  Extremities: No clubbing, no cyanosis, no edema. Musculoskeletal: No pain in range of motion of joints  Neurological: No focal neurodeficit.   Lines: peripheral    Laboratory and Tests Review:  Lab Results   Component Value Date    WBC 14.9 (H) 12/22/2022    WBC 14.4 (H) 12/21/2022    WBC 19.0 (H) 12/20/2022    HGB 13.6 12/22/2022    HCT 41.9 12/22/2022    MCV 86.6 12/22/2022     12/22/2022     Lab Results   Component Value Date    NEUTROABS 11.11 (H) 11/14/2022    NEUTROABS 3.79 07/25/2021    NEUTROABS 3.98 07/10/2021     No results found for: [facility]  Lab Results   Component Value Date    ALT 23 12/22/2022    AST 30 12/22/2022    ALKPHOS 87 12/22/2022    BILITOT 0.5 12/22/2022     Lab Results   Component Value Date/Time     12/22/2022 05:30 AM    K 3.8 12/22/2022 05:30 AM    K 4.0 07/10/2021 12:23 PM     12/22/2022 05:30 AM    CO2 21 12/22/2022 05:30 AM    BUN 22 12/22/2022 05:30 AM    CREATININE 1.1 12/22/2022 05:30 AM    CREATININE 1.1 12/21/2022 05:52 AM    CREATININE 0.9 12/20/2022 05:02 AM    GFRAA >60 08/18/2022 09:50 AM    LABGLOM 48 12/22/2022 05:30 AM    GLUCOSE 114 12/22/2022 05:30 AM    PROT 6.5 12/22/2022 05:30 AM    LABALBU 3.8 12/22/2022 05:30 AM    CALCIUM 9.5 12/22/2022 05:30 AM    BILITOT 0.5 12/22/2022 05:30 AM    ALKPHOS 87 12/22/2022 05:30 AM    AST 30 12/22/2022 05:30 AM    ALT 23 12/22/2022 05:30 AM     Lab Results   Component Value Date    CRP 2.0 (H) 07/25/2018     Lab Results   Component Value Date    SEDRATE 21 (H) 07/25/2018     Radiology:      Microbiology:   Lab Results   Component Value Date/Time    BC 24 Hours no growth 12/20/2022 05:02 AM    BC 24 Hours no growth 12/20/2022 05:02 AM    BC 5 Days- no growth 02/06/2020 06:58 AM    ORG Escherichia coli 12/19/2022 05:02 PM    ORG Hafnia species 07/25/2021 02:05 AM    ORG Hafnia species 07/10/2021 05:00 PM     Lab Results Component Value Date/Time    BLOODCULT2 5 Days- no growth 02/06/2020 07:00 AM    BLOODCULT2  05/10/2019 04:21 PM     Susceptibility testing for this isolate is restricted to  normally sterile sources and is a reference laboratory  referral.  Please contact the laboratory to discuss further testing  options if clinically indicated. BLOODCULT2 5 Days- no growth 10/06/2017 08:25 PM    ORG Escherichia coli 12/19/2022 05:02 PM    ORG Hafnia species 07/25/2021 02:05 AM    ORG Hafnia species 07/10/2021 05:00 PM     No results found for: WNDABS  No results found for: RESPSMEAR  No results found for: MPNEUMO, CLAMYDCU, LABLEGI, AFBCX, FUNGSM, LABFUNG  No results found for: CULTRESP  No results found for: CXCATHTIP  No results found for: BFCS  No results found for: CXSURG  Urine Culture, Routine   Date Value Ref Range Status   12/19/2022 >100,000 CFU/ml  Final   07/25/2021 >100,000 CFU/ml  Hafnia species is  paralvei    Final   07/10/2021 >100,000 CFU/ml  Final     No results found for: MRSAC    Assessment:  Ecoli Pansensitive UTI  Previously grew Hafnia and E. coli which is pansensitive     Plan:    Continue ceftriaxone until 12/25 then dc with oral cefdinir. Follow urine cultures  Patient has suprapubic pain suggestive of UTI with leukocytosis. Will continue to follow.     Elham Allison MD  1:44 PM  12/23/2022

## 2022-12-24 PROCEDURE — 2580000003 HC RX 258: Performed by: INTERNAL MEDICINE

## 2022-12-24 PROCEDURE — 6360000002 HC RX W HCPCS: Performed by: INTERNAL MEDICINE

## 2022-12-24 PROCEDURE — 2140000000 HC CCU INTERMEDIATE R&B

## 2022-12-24 PROCEDURE — 6370000000 HC RX 637 (ALT 250 FOR IP): Performed by: INTERNAL MEDICINE

## 2022-12-24 PROCEDURE — 6370000000 HC RX 637 (ALT 250 FOR IP): Performed by: NURSE PRACTITIONER

## 2022-12-24 RX ORDER — SODIUM CHLORIDE 0.9 % (FLUSH) 0.9 %
5-40 SYRINGE (ML) INJECTION 2 TIMES DAILY
Status: DISCONTINUED | OUTPATIENT
Start: 2022-12-24 | End: 2022-12-25 | Stop reason: HOSPADM

## 2022-12-24 RX ADMIN — ACETAMINOPHEN 500 MG: 500 TABLET, FILM COATED ORAL at 09:50

## 2022-12-24 RX ADMIN — APIXABAN 5 MG: 5 TABLET, FILM COATED ORAL at 20:39

## 2022-12-24 RX ADMIN — TRIAMTERENE AND HYDROCHLOROTHIAZIDE 1 TABLET: 37.5; 25 TABLET ORAL at 09:01

## 2022-12-24 RX ADMIN — METOPROLOL SUCCINATE 25 MG: 25 TABLET, EXTENDED RELEASE ORAL at 09:00

## 2022-12-24 RX ADMIN — FLECAINIDE ACETATE 50 MG: 50 TABLET ORAL at 08:59

## 2022-12-24 RX ADMIN — SODIUM CHLORIDE, PRESERVATIVE FREE 10 ML: 5 INJECTION INTRAVENOUS at 12:10

## 2022-12-24 RX ADMIN — LOSARTAN POTASSIUM 50 MG: 50 TABLET, FILM COATED ORAL at 09:00

## 2022-12-24 RX ADMIN — ACETAMINOPHEN 500 MG: 500 TABLET, FILM COATED ORAL at 17:09

## 2022-12-24 RX ADMIN — Medication 2000 UNITS: at 08:59

## 2022-12-24 RX ADMIN — SODIUM CHLORIDE, PRESERVATIVE FREE 10 ML: 5 INJECTION INTRAVENOUS at 20:39

## 2022-12-24 RX ADMIN — CEFTRIAXONE 2000 MG: 2 INJECTION, POWDER, FOR SOLUTION INTRAMUSCULAR; INTRAVENOUS at 15:18

## 2022-12-24 RX ADMIN — APIXABAN 5 MG: 5 TABLET, FILM COATED ORAL at 09:00

## 2022-12-24 RX ADMIN — FLECAINIDE ACETATE 50 MG: 50 TABLET ORAL at 20:39

## 2022-12-24 ASSESSMENT — PAIN DESCRIPTION - DESCRIPTORS
DESCRIPTORS: DISCOMFORT;SORE;PRESSURE
DESCRIPTORS: DISCOMFORT;TENDER;SORE

## 2022-12-24 ASSESSMENT — PAIN SCALES - GENERAL
PAINLEVEL_OUTOF10: 4
PAINLEVEL_OUTOF10: 6
PAINLEVEL_OUTOF10: 8
PAINLEVEL_OUTOF10: 4
PAINLEVEL_OUTOF10: 0

## 2022-12-24 ASSESSMENT — PAIN DESCRIPTION - ORIENTATION
ORIENTATION: RIGHT
ORIENTATION: MID

## 2022-12-24 ASSESSMENT — PAIN DESCRIPTION - LOCATION
LOCATION: BACK;RIB CAGE
LOCATION: HEAD
LOCATION: HEAD

## 2022-12-24 ASSESSMENT — PAIN DESCRIPTION - PAIN TYPE
TYPE: ACUTE PAIN

## 2022-12-24 ASSESSMENT — PAIN DESCRIPTION - FREQUENCY: FREQUENCY: CONTINUOUS

## 2022-12-24 NOTE — PROGRESS NOTES
[address] [place] [address] [facility]  [facility]  Progress Note      Chief Complaint   Patient presents with    Tachycardia     Was sent by Dr. Lyly Mcdowell for high heart rate. C/O dizziness and being lightheaded for a month. SUBJECTIVE:  80-year-old female with past medical history of paroxysmal atrial tachycardia, hypertension, COPD, type 2 AV block, history of recurrent near syncope, status post dual chamber pacemaker, status post suprapubic catheter, who had stopped her Eliquis on her own because of dizziness presented with A. fib RVR. She was found to have leukocytosis. ID consulted for suspected UTI. Patient is tolerating medications. No reported adverse drug reactions. No nausea, vomiting, diarrhea. Review of systems:  As stated above in the chief complaint, otherwise negative. Medications:  Scheduled Meds:   sodium chloride flush  5-40 mL IntraVENous BID    cefTRIAXone (ROCEPHIN) IV  2,000 mg IntraVENous Q24H    vitamin D  2,000 Units Oral Daily    losartan  50 mg Oral Daily    triamterene-hydroCHLOROthiazide  1 tablet Oral QAM    apixaban  5 mg Oral BID    metoprolol succinate  25 mg Oral Daily    flecainide  50 mg Oral 2 times per day     Continuous Infusions:  PRN Meds:acetaminophen    OBJECTIVE:  /60   Pulse 70   Temp 97.6 °F (36.4 °C) (Temporal)   Resp 16   Ht 5' 3\" (1.6 m)   Wt 150 lb (68 kg)   SpO2 95%   BMI 26.57 kg/m²   Temp  Av.6 °F (36.4 °C)  Min: 97.5 °F (36.4 °C)  Max: 97.8 °F (36.6 °C)  Constitutional: The patient is awake, alert, and oriented. Skin: Warm and dry. No rashes were noted. No jaundice. HEENT: Eyes show round, and reactive pupils. Moist mucous membranes, no ulcerations, no thrush. Neck: Supple to movements. No lymphadenopathy. Chest: No use of accessory muscles to breathe. Symmetrical expansion. Auscultation reveals no wheezing, crackles, or rhonchi. Cardiovascular: S1 and S2 are rhythmic and regular. No murmurs appreciated.    Abdomen: species 07/10/2021 05:00 PM     Lab Results   Component Value Date/Time    BLOODCULT2 5 Days- no growth 02/06/2020 07:00 AM    BLOODCULT2  05/10/2019 04:21 PM     Susceptibility testing for this isolate is restricted to  normally sterile sources and is a reference laboratory  referral.  Please contact the laboratory to discuss further testing  options if clinically indicated. BLOODCULT2 5 Days- no growth 10/06/2017 08:25 PM    ORG Escherichia coli 12/19/2022 05:02 PM    ORG Hafnia species 07/25/2021 02:05 AM    ORG Hafnia species 07/10/2021 05:00 PM     No results found for: WNDABS  No results found for: RESPSMEAR  No results found for: MPNEUMO, CLAMYDCU, LABLEGI, AFBCX, FUNGSM, LABFUNG  No results found for: CULTRESP  No results found for: CXCATHTIP  No results found for: BFCS  No results found for: CXSURG  Urine Culture, Routine   Date Value Ref Range Status   12/19/2022 >100,000 CFU/ml  Final   07/25/2021 >100,000 CFU/ml  Hafnia species is  paralvei    Final   07/10/2021 >100,000 CFU/ml  Final     No results found for: MRSAC    Assessment:  Ecoli Pansensitive UTI  Previously grew Hafnia and E. coli which is pansensitive     Plan:    Continue ceftriaxone until 12/25 then dc with oral cefdinir 300 every 12 for 7 days. .  Follow urine cultures  Patient has suprapubic pain suggestive of UTI with leukocytosis. Will continue to follow.     Trena Terry MD  12:24 PM  12/24/2022

## 2022-12-24 NOTE — PROGRESS NOTES
Describes some dizziness when upright  Afebrile , vs stable  RRR  Lungs clear  Will stop the diuretic and repeat labs in AM  Anticipate discharge tomorrow when she changes to oral ATB

## 2022-12-24 NOTE — PLAN OF CARE
Problem: Discharge Planning  Goal: Discharge to home or other facility with appropriate resources  12/23/2022 2204 by Andree Sullivan RN  Outcome: Progressing  12/23/2022 0903 by Yoseph Webster RN  Outcome: Progressing     Problem: Pain  Goal: Verbalizes/displays adequate comfort level or baseline comfort level  12/23/2022 2204 by Andree Sullivan RN  Outcome: Progressing  12/23/2022 0903 by Yoseph Webster RN  Outcome: Progressing     Problem: Safety - Adult  Goal: Free from fall injury  12/23/2022 2204 by Andree Sullivan RN  Outcome: Progressing  12/23/2022 0903 by Yoseph Webster RN  Outcome: Progressing     Problem: ABCDS Injury Assessment  Goal: Absence of physical injury  12/23/2022 2204 by Andree Sullivan RN  Outcome: Progressing  12/23/2022 0903 by Yoseph Webster RN  Outcome: Progressing     Problem: Skin/Tissue Integrity  Goal: Absence of new skin breakdown  Description: 1. Monitor for areas of redness and/or skin breakdown  2. Assess vascular access sites hourly  3. Every 4-6 hours minimum:  Change oxygen saturation probe site  4. Every 4-6 hours:  If on nasal continuous positive airway pressure, respiratory therapy assess nares and determine need for appliance change or resting period.   12/23/2022 2204 by Andree Sullivan RN  Outcome: Progressing  12/23/2022 0903 by Yoseph Webster RN  Outcome: Progressing

## 2022-12-25 VITALS
RESPIRATION RATE: 17 BRPM | WEIGHT: 150 LBS | TEMPERATURE: 98.1 F | BODY MASS INDEX: 26.58 KG/M2 | DIASTOLIC BLOOD PRESSURE: 54 MMHG | HEIGHT: 63 IN | OXYGEN SATURATION: 96 % | SYSTOLIC BLOOD PRESSURE: 95 MMHG | HEART RATE: 59 BPM

## 2022-12-25 LAB
ALBUMIN SERPL-MCNC: 3.9 G/DL (ref 3.5–5.2)
ALP BLD-CCNC: 88 U/L (ref 35–104)
ALT SERPL-CCNC: 20 U/L (ref 0–32)
ANION GAP SERPL CALCULATED.3IONS-SCNC: 17 MMOL/L (ref 7–16)
AST SERPL-CCNC: 33 U/L (ref 0–31)
BILIRUB SERPL-MCNC: 0.5 MG/DL (ref 0–1.2)
BLOOD CULTURE, ROUTINE: NORMAL
BLOOD CULTURE, ROUTINE: NORMAL
BUN BLDV-MCNC: 30 MG/DL (ref 6–23)
CALCIUM SERPL-MCNC: 9.9 MG/DL (ref 8.6–10.2)
CHLORIDE BLD-SCNC: 100 MMOL/L (ref 98–107)
CO2: 21 MMOL/L (ref 22–29)
CREAT SERPL-MCNC: 1.1 MG/DL (ref 0.5–1)
GFR SERPL CREATININE-BSD FRML MDRD: 48 ML/MIN/1.73
GLUCOSE BLD-MCNC: 113 MG/DL (ref 74–99)
HCT VFR BLD CALC: 42.8 % (ref 34–48)
HEMOGLOBIN: 14.1 G/DL (ref 11.5–15.5)
MCH RBC QN AUTO: 28.4 PG (ref 26–35)
MCHC RBC AUTO-ENTMCNC: 32.9 % (ref 32–34.5)
MCV RBC AUTO: 86.1 FL (ref 80–99.9)
PDW BLD-RTO: 13.2 FL (ref 11.5–15)
PLATELET # BLD: 362 E9/L (ref 130–450)
PMV BLD AUTO: 10.8 FL (ref 7–12)
POTASSIUM SERPL-SCNC: 3.9 MMOL/L (ref 3.5–5)
RBC # BLD: 4.97 E12/L (ref 3.5–5.5)
SODIUM BLD-SCNC: 138 MMOL/L (ref 132–146)
TOTAL PROTEIN: 7.2 G/DL (ref 6.4–8.3)
WBC # BLD: 18.2 E9/L (ref 4.5–11.5)

## 2022-12-25 PROCEDURE — 2580000003 HC RX 258: Performed by: INTERNAL MEDICINE

## 2022-12-25 PROCEDURE — 80053 COMPREHEN METABOLIC PANEL: CPT

## 2022-12-25 PROCEDURE — 36415 COLL VENOUS BLD VENIPUNCTURE: CPT

## 2022-12-25 PROCEDURE — 85027 COMPLETE CBC AUTOMATED: CPT

## 2022-12-25 PROCEDURE — 6360000002 HC RX W HCPCS: Performed by: INTERNAL MEDICINE

## 2022-12-25 PROCEDURE — 6370000000 HC RX 637 (ALT 250 FOR IP): Performed by: INTERNAL MEDICINE

## 2022-12-25 PROCEDURE — 6370000000 HC RX 637 (ALT 250 FOR IP): Performed by: NURSE PRACTITIONER

## 2022-12-25 RX ORDER — CEFDINIR 300 MG/1
300 CAPSULE ORAL EVERY 12 HOURS SCHEDULED
Status: DISCONTINUED | OUTPATIENT
Start: 2022-12-26 | End: 2022-12-25 | Stop reason: HOSPADM

## 2022-12-25 RX ORDER — METOPROLOL SUCCINATE 25 MG/1
25 TABLET, EXTENDED RELEASE ORAL DAILY
Qty: 30 TABLET | Refills: 3 | Status: SHIPPED | OUTPATIENT
Start: 2022-12-25

## 2022-12-25 RX ORDER — CEFDINIR 300 MG/1
300 CAPSULE ORAL EVERY 12 HOURS SCHEDULED
Qty: 14 CAPSULE | Refills: 0 | Status: SHIPPED | OUTPATIENT
Start: 2022-12-26 | End: 2023-01-02

## 2022-12-25 RX ORDER — CEFDINIR 300 MG/1
300 CAPSULE ORAL 2 TIMES DAILY
Qty: 14 CAPSULE | Refills: 0 | Status: SHIPPED | OUTPATIENT
Start: 2022-12-25 | End: 2022-12-25 | Stop reason: HOSPADM

## 2022-12-25 RX ORDER — FLECAINIDE ACETATE 50 MG/1
50 TABLET ORAL EVERY 12 HOURS SCHEDULED
Qty: 60 TABLET | Refills: 3 | Status: SHIPPED | OUTPATIENT
Start: 2022-12-25

## 2022-12-25 RX ADMIN — SODIUM CHLORIDE, PRESERVATIVE FREE 10 ML: 5 INJECTION INTRAVENOUS at 08:47

## 2022-12-25 RX ADMIN — ACETAMINOPHEN 500 MG: 500 TABLET, FILM COATED ORAL at 15:41

## 2022-12-25 RX ADMIN — Medication 2000 UNITS: at 08:47

## 2022-12-25 RX ADMIN — ACETAMINOPHEN 500 MG: 500 TABLET, FILM COATED ORAL at 08:47

## 2022-12-25 RX ADMIN — SODIUM CHLORIDE, PRESERVATIVE FREE 10 ML: 5 INJECTION INTRAVENOUS at 15:27

## 2022-12-25 RX ADMIN — METOPROLOL SUCCINATE 25 MG: 25 TABLET, EXTENDED RELEASE ORAL at 08:46

## 2022-12-25 RX ADMIN — ACETAMINOPHEN 500 MG: 500 TABLET, FILM COATED ORAL at 03:08

## 2022-12-25 RX ADMIN — APIXABAN 5 MG: 5 TABLET, FILM COATED ORAL at 08:47

## 2022-12-25 RX ADMIN — FLECAINIDE ACETATE 50 MG: 50 TABLET ORAL at 08:46

## 2022-12-25 RX ADMIN — CEFTRIAXONE 2000 MG: 2 INJECTION, POWDER, FOR SOLUTION INTRAMUSCULAR; INTRAVENOUS at 15:26

## 2022-12-25 RX ADMIN — LOSARTAN POTASSIUM 50 MG: 50 TABLET, FILM COATED ORAL at 08:46

## 2022-12-25 ASSESSMENT — PAIN DESCRIPTION - DESCRIPTORS
DESCRIPTORS: DISCOMFORT;DULL;TENDER
DESCRIPTORS: ACHING;DISCOMFORT;DULL
DESCRIPTORS: ACHING;DISCOMFORT;THROBBING
DESCRIPTORS: DISCOMFORT;DULL;SORE

## 2022-12-25 ASSESSMENT — PAIN DESCRIPTION - ORIENTATION
ORIENTATION: LOWER

## 2022-12-25 ASSESSMENT — PAIN DESCRIPTION - LOCATION
LOCATION: BACK

## 2022-12-25 ASSESSMENT — PAIN SCALES - GENERAL
PAINLEVEL_OUTOF10: 2
PAINLEVEL_OUTOF10: 4
PAINLEVEL_OUTOF10: 7
PAINLEVEL_OUTOF10: 7
PAINLEVEL_OUTOF10: 4
PAINLEVEL_OUTOF10: 6

## 2022-12-25 ASSESSMENT — PAIN DESCRIPTION - PAIN TYPE: TYPE: CHRONIC PAIN

## 2022-12-25 ASSESSMENT — PAIN SCALES - WONG BAKER: WONGBAKER_NUMERICALRESPONSE: 0

## 2022-12-25 ASSESSMENT — PAIN DESCRIPTION - FREQUENCY: FREQUENCY: CONTINUOUS

## 2022-12-25 NOTE — PROGRESS NOTES
[address] [place] [address] [facility]  [facility]  Progress Note      Chief Complaint   Patient presents with    Tachycardia     Was sent by Dr. Marilee Davalos for high heart rate. C/O dizziness and being lightheaded for a month. SUBJECTIVE:  42-year-old female with past medical history of paroxysmal atrial tachycardia, hypertension, COPD, type 2 AV block, history of recurrent near syncope, status post dual chamber pacemaker, status post suprapubic catheter, who had stopped her Eliquis on her own because of dizziness presented with A. fib RVR. She was found to have leukocytosis. ID consulted for suspected UTI. Patient is tolerating medications. No reported adverse drug reactions. No nausea, vomiting, diarrhea. Review of systems:  As stated above in the chief complaint, otherwise negative. Medications:  Scheduled Meds:   [START ON 2022] cefdinir  300 mg Oral 2 times per day    sodium chloride flush  5-40 mL IntraVENous BID    cefTRIAXone (ROCEPHIN) IV  2,000 mg IntraVENous Q24H    vitamin D  2,000 Units Oral Daily    losartan  50 mg Oral Daily    apixaban  5 mg Oral BID    metoprolol succinate  25 mg Oral Daily    flecainide  50 mg Oral 2 times per day     Continuous Infusions:  PRN Meds:acetaminophen    OBJECTIVE:  BP (!) 95/54   Pulse 59   Temp 98.1 °F (36.7 °C) (Oral)   Resp 17   Ht 5' 3\" (1.6 m)   Wt 150 lb (68 kg)   SpO2 96%   BMI 26.57 kg/m²   Temp  Av.2 °F (36.2 °C)  Min: 96.8 °F (36 °C)  Max: 98.1 °F (36.7 °C)  Constitutional: The patient is awake, alert, and oriented. Skin: Warm and dry. No rashes were noted. No jaundice. HEENT: Eyes show round, and reactive pupils. Moist mucous membranes, no ulcerations, no thrush. Neck: Supple to movements. No lymphadenopathy. Chest: No use of accessory muscles to breathe. Symmetrical expansion. Auscultation reveals no wheezing, crackles, or rhonchi. Cardiovascular: S1 and S2 are rhythmic and regular. No murmurs appreciated.    Abdomen: Positive bowel sounds to auscultation. Benign to palpation. No masses felt. No hepatosplenomegaly. Genitourinary: Suprapubic tenderness noted  Extremities: No clubbing, no cyanosis, no edema. Musculoskeletal: No pain in range of motion of joints  Neurological: No focal neurodeficit.   Lines: peripheral    Laboratory and Tests Review:  Lab Results   Component Value Date    WBC 18.2 (H) 12/25/2022    WBC 14.9 (H) 12/22/2022    WBC 14.4 (H) 12/21/2022    HGB 14.1 12/25/2022    HCT 42.8 12/25/2022    MCV 86.1 12/25/2022     12/25/2022     Lab Results   Component Value Date    NEUTROABS 11.11 (H) 11/14/2022    NEUTROABS 3.79 07/25/2021    NEUTROABS 3.98 07/10/2021     No results found for: [facility]  Lab Results   Component Value Date    ALT 20 12/25/2022    AST 33 (H) 12/25/2022    ALKPHOS 88 12/25/2022    BILITOT 0.5 12/25/2022     Lab Results   Component Value Date/Time     12/25/2022 04:52 AM    K 3.9 12/25/2022 04:52 AM    K 4.0 07/10/2021 12:23 PM     12/25/2022 04:52 AM    CO2 21 12/25/2022 04:52 AM    BUN 30 12/25/2022 04:52 AM    CREATININE 1.1 12/25/2022 04:52 AM    CREATININE 1.1 12/22/2022 05:30 AM    CREATININE 1.1 12/21/2022 05:52 AM    GFRAA >60 08/18/2022 09:50 AM    LABGLOM 48 12/25/2022 04:52 AM    GLUCOSE 113 12/25/2022 04:52 AM    PROT 7.2 12/25/2022 04:52 AM    LABALBU 3.9 12/25/2022 04:52 AM    CALCIUM 9.9 12/25/2022 04:52 AM    BILITOT 0.5 12/25/2022 04:52 AM    ALKPHOS 88 12/25/2022 04:52 AM    AST 33 12/25/2022 04:52 AM    ALT 20 12/25/2022 04:52 AM     Lab Results   Component Value Date    CRP 2.0 (H) 07/25/2018     Lab Results   Component Value Date    SEDRATE 21 (H) 07/25/2018     Radiology:      Microbiology:   Lab Results   Component Value Date/Time    BC 5 Days no growth 12/20/2022 05:02 AM    BC 5 Days no growth 12/20/2022 05:02 AM    BC 5 Days- no growth 02/06/2020 06:58 AM    ORG Escherichia coli 12/19/2022 05:02 PM    ORG Hafnia species 07/25/2021 02:05 AM    ORG Hafnia species 07/10/2021 05:00 PM     Lab Results   Component Value Date/Time    BLOODCULT2 5 Days- no growth 02/06/2020 07:00 AM    BLOODCULT2  05/10/2019 04:21 PM     Susceptibility testing for this isolate is restricted to  normally sterile sources and is a reference laboratory  referral.  Please contact the laboratory to discuss further testing  options if clinically indicated. BLOODCULT2 5 Days- no growth 10/06/2017 08:25 PM    ORG Escherichia coli 12/19/2022 05:02 PM    ORG Hafnia species 07/25/2021 02:05 AM    ORG Hafnia species 07/10/2021 05:00 PM     No results found for: WNDABS  No results found for: RESPSMEAR  No results found for: MPNEUMO, CLAMYDCU, LABLEGI, AFBCX, FUNGSM, LABFUNG  No results found for: CULTRESP  No results found for: CXCATHTIP  No results found for: BFCS  No results found for: CXSURG  Urine Culture, Routine   Date Value Ref Range Status   12/19/2022 >100,000 CFU/ml  Final   07/25/2021 >100,000 CFU/ml  Hafnia species is  paralvei    Final   07/10/2021 >100,000 CFU/ml  Final     No results found for: MRSAC    Assessment:  Ecoli Pansensitive UTI  Previously grew Hafnia and E. coli which is pansensitive     Plan:    Completed ceftriaxone course. Patient can be discharged with 7-day course of cefdinir 3 mg every 12. ID services will sign off.     Kitty Ortiz MD  3:10 PM  12/25/2022

## 2022-12-25 NOTE — PLAN OF CARE
Patient states has pain in back ,patient encouraged to get out of bed and sit up in a chair.       Up with assistance with walker and 1 staff

## 2022-12-25 NOTE — PLAN OF CARE
Problem: Discharge Planning  Goal: Discharge to home or other facility with appropriate resources  Outcome: Progressing     Problem: Pain  Goal: Verbalizes/displays adequate comfort level or baseline comfort level  Outcome: Progressing     Problem: Safety - Adult  Goal: Free from fall injury  Outcome: Progressing     Problem: ABCDS Injury Assessment  Goal: Absence of physical injury  Outcome: Progressing  Flowsheets (Taken 12/24/2022 2034)  Absence of Physical Injury: Implement safety measures based on patient assessment     Problem: Skin/Tissue Integrity  Goal: Absence of new skin breakdown  Description: 1. Monitor for areas of redness and/or skin breakdown  2. Assess vascular access sites hourly  3. Every 4-6 hours minimum:  Change oxygen saturation probe site  4. Every 4-6 hours:  If on nasal continuous positive airway pressure, respiratory therapy assess nares and determine need for appliance change or resting period.   Outcome: Progressing

## 2022-12-25 NOTE — PROGRESS NOTES
[facility] Layton Ram) notified patient going home today and new orders obtained

## 2022-12-25 NOTE — PROGRESS NOTES
Somewhat forgetful and weak at times  Otherwise doing well   Afebrile and hemodynamically stable  RRR  Lungs clear  Will discharge to home later today with [facility]

## 2022-12-25 NOTE — PROGRESS NOTES
Patient and son given written and verbal discharge  instructions. patient has meds to bed eliquis/toprol XL, and tambocor and script given for antibiotic.

## 2022-12-27 NOTE — DISCHARGE SUMMARY
[address]                  [address] [place],  [facility]                               DISCHARGE SUMMARY    PATIENT NAME: Aura Wan                        :        1935  MED REC NO:   [id number]                            ROOM:       [id number]  ACCOUNT NO:   [de-identified]                           ADMIT DATE: 2022  PROVIDER:     Yariel Crooks MD                  DISCHARGE DATE:  2022    HISTORY AND HOSPITAL COURSE:  This patient is an 80-year-old female with  history of paroxysmal atrial tachycardia, paroxysmal atrial  fibrillation, hypertension, COPD, type 2 AV block, dual chamber  pacemaker, suprapubic catheter and recurrent complex urinary tract  infections, who was in my office on 2022 for routine visit. She  had actually been lost to follow up with me for almost two and one half  years. On this day, she presents for routine checkup, says that she  felt a little bit dizzy and lightheaded at times, but overall was not  feeling all to bad. Examination demonstrated a supraventricular  tachycardia with a rate of approximately 140 beats per minute. She was  transferred to the emergency room via ambulance transport, was admitted  after being started on Cardizem drip to the telemetry area, was seen in  consultation by Electrophysiology. She also had what appeared to be an  active urinary sediment with leukocytosis. Culture of urine and blood  were obtained. The patient's rate was controlled on the Cardizem and  then later on other medications. Apparently, she had stopped taking  Eliquis in the past at some point because they made her feel dizzy. The  Eliquis was resumed. She continued to improve. We consulted a  Infectious Disease for evaluation of the suprapubic catheter urinary  tract infection which grew out E-coli, whether this was an actual  infection versus asymptomatic bacteriuria.   They felt this was not this  colonization and she was treated with Rocephin 1 gm IV for several days  and then later at discharge switched to Omnicef 300 mg twice a day. The  patient was discharged to home on 12/25/2022. CONDITION ON DISCHARGE:  Improved. DISPOSITION:  Home. FINAL DIAGNOSES:  1. Recurrent paroxysmal atrial tachycardia. 2.  Paroxysmal atrial fibrillation. 3.  Type 2 AV block. 4.  COPD. 5.  Hypertension. 6.  Dual-chamber pacemaker. 7.  Suprapubic catheter with E. Coli urinary tract infection. MEDICATIONS ON DISCHARGE:  Include Eliquis 5 mg twice a day, Omnicef 300  mg twice a day for 7 days, flecainide 50 mg twice a day, irbesartan 150  mg once a day, Toprol-XL 25 mg daily, vitamin D 2000 units daily and her  eyedrops. Condition on discharge, improved. Disposition, home. Diagnoses, as  listed above. Medications, as listed above.         Trent Allen MD    D: 12/26/2022 12:51:13       T: 12/26/2022 12:53:26     [email or web address]  Job#: [id number]     Doc#: [id number]    CC:

## 2022-12-28 ENCOUNTER — NURSE ONLY (OUTPATIENT)
Dept: NON INVASIVE DIAGNOSTICS | Age: 87
End: 2022-12-28
Payer: MEDICARE

## 2022-12-28 DIAGNOSIS — I48.91 ATRIAL FIBRILLATION, UNSPECIFIED TYPE (HCC): Primary | ICD-10-CM

## 2022-12-28 PROCEDURE — 93000 ELECTROCARDIOGRAM COMPLETE: CPT | Performed by: INTERNAL MEDICINE

## 2022-12-28 NOTE — PROGRESS NOTES
Patient was in today for EKG per Dr. Louis Peers, 1 week s/p hospital discharge. Patient has no complaints and states she is feeling much better than last week.     Electronically signed by Elizabeth Cuba MA on 12/28/2022 at 11:06 AM

## 2023-06-06 RX ORDER — APIXABAN 5 MG/1
TABLET, FILM COATED ORAL
Qty: 180 TABLET | Refills: 3 | Status: SHIPPED | OUTPATIENT
Start: 2023-06-06

## 2023-07-05 ENCOUNTER — HOSPITAL ENCOUNTER (EMERGENCY)
Age: 88
Discharge: HOME | End: 2023-07-05
Attending: EMERGENCY MEDICINE
Payer: MEDICARE

## 2023-07-05 VITALS
BODY MASS INDEX: 25.15 KG/M2 | OXYGEN SATURATION: 99 % | SYSTOLIC BLOOD PRESSURE: 152 MMHG | TEMPERATURE: 98 F | HEART RATE: 66 BPM | DIASTOLIC BLOOD PRESSURE: 50 MMHG | WEIGHT: 142 LBS | RESPIRATION RATE: 16 BRPM

## 2023-07-05 DIAGNOSIS — D64.9 ANEMIA, UNSPECIFIED TYPE: Primary | ICD-10-CM

## 2023-07-05 LAB
ABO + RH BLD: NORMAL
ALBUMIN SERPL-MCNC: 3.6 G/DL (ref 3.5–5.2)
ALBUMIN SERPL-MCNC: 4 G/DL (ref 3.5–5.2)
ALP SERPL-CCNC: 90 U/L (ref 35–104)
ALP SERPL-CCNC: 95 U/L (ref 35–104)
ALT SERPL-CCNC: 6 U/L (ref 0–32)
ALT SERPL-CCNC: 7 U/L (ref 0–32)
ANION GAP SERPL CALCULATED.3IONS-SCNC: 10 MMOL/L (ref 7–16)
ANION GAP SERPL CALCULATED.3IONS-SCNC: 12 MMOL/L (ref 7–16)
ANISOCYTOSIS: ABNORMAL
ANISOCYTOSIS: ABNORMAL
APTT BLD: 31 SEC (ref 24.5–35.1)
AST SERPL-CCNC: 18 U/L (ref 0–31)
AST SERPL-CCNC: 20 U/L (ref 0–31)
BASOPHILIC STIPPLING: ABNORMAL
BASOPHILS # BLD: 0 E9/L (ref 0–0.2)
BASOPHILS # BLD: 0.14 E9/L (ref 0–0.2)
BASOPHILS NFR BLD: 0.7 % (ref 0–2)
BASOPHILS NFR BLD: 0.9 % (ref 0–2)
BILIRUB SERPL-MCNC: 0.6 MG/DL (ref 0–1.2)
BILIRUB SERPL-MCNC: 0.6 MG/DL (ref 0–1.2)
BLD GP AB SCN SERPL QL: NORMAL
BLOOD BANK DISPENSE STATUS: NORMAL
BLOOD BANK PRODUCT CODE: NORMAL
BPU ID: NORMAL
BUN SERPL-MCNC: 16 MG/DL (ref 6–23)
BUN SERPL-MCNC: 17 MG/DL (ref 6–23)
BURR CELLS: ABNORMAL
CALCIUM SERPL-MCNC: 8.5 MG/DL (ref 8.6–10.2)
CALCIUM SERPL-MCNC: 9 MG/DL (ref 8.6–10.2)
CHLORIDE SERPL-SCNC: 102 MMOL/L (ref 98–107)
CHLORIDE SERPL-SCNC: 104 MMOL/L (ref 98–107)
CO2 SERPL-SCNC: 22 MMOL/L (ref 22–29)
CO2 SERPL-SCNC: 24 MMOL/L (ref 22–29)
CREAT SERPL-MCNC: 0.6 MG/DL (ref 0.5–1)
CREAT SERPL-MCNC: 0.7 MG/DL (ref 0.5–1)
DESCRIPTION BLOOD BANK: NORMAL
EKG ATRIAL RATE: 65 BPM
EKG P AXIS: 42 DEGREES
EKG P-R INTERVAL: 178 MS
EKG Q-T INTERVAL: 428 MS
EKG QRS DURATION: 76 MS
EKG QTC CALCULATION (BAZETT): 445 MS
EKG R AXIS: 22 DEGREES
EKG T AXIS: 25 DEGREES
EKG VENTRICULAR RATE: 65 BPM
EOSINOPHIL # BLD: 0.68 E9/L (ref 0.05–0.5)
EOSINOPHIL # BLD: 0.81 E9/L (ref 0.05–0.5)
EOSINOPHIL NFR BLD: 4.4 % (ref 0–6)
EOSINOPHIL NFR BLD: 4.4 % (ref 0–6)
ERYTHROCYTE [DISTWIDTH] IN BLOOD BY AUTOMATED COUNT: 19 FL (ref 11.5–15)
ERYTHROCYTE [DISTWIDTH] IN BLOOD BY AUTOMATED COUNT: 19.5 FL (ref 11.5–15)
GLUCOSE SERPL-MCNC: 109 MG/DL (ref 74–99)
GLUCOSE SERPL-MCNC: 87 MG/DL (ref 74–99)
HCT VFR BLD AUTO: 23.9 % (ref 34–48)
HCT VFR BLD AUTO: 25.6 % (ref 34–48)
HGB BLD-MCNC: 5.8 G/DL (ref 11.5–15.5)
HGB BLD-MCNC: 6.2 G/DL (ref 11.5–15.5)
HYPOCHROMIA: ABNORMAL
HYPOCHROMIA: ABNORMAL
INR BLD: 2.3
LYMPHOCYTES # BLD: 0.77 E9/L (ref 1.5–4)
LYMPHOCYTES # BLD: 1.84 E9/L (ref 1.5–4)
LYMPHOCYTES NFR BLD: 10.4 % (ref 20–42)
LYMPHOCYTES NFR BLD: 5.2 % (ref 20–42)
MCH RBC QN AUTO: 14.9 PG (ref 26–35)
MCH RBC QN AUTO: 15.3 PG (ref 26–35)
MCHC RBC AUTO-ENTMCNC: 24.2 % (ref 32–34.5)
MCHC RBC AUTO-ENTMCNC: 24.3 % (ref 32–34.5)
MCV RBC AUTO: 61.4 FL (ref 80–99.9)
MCV RBC AUTO: 62.9 FL (ref 80–99.9)
MONOCYTES # BLD: 1.54 E9/L (ref 0.1–0.95)
MONOCYTES # BLD: 1.84 E9/L (ref 0.1–0.95)
MONOCYTES NFR BLD: 10.4 % (ref 2–12)
MONOCYTES NFR BLD: 10.4 % (ref 2–12)
NEUTROPHILS # BLD: 12.17 E9/L (ref 1.8–7.3)
NEUTROPHILS # BLD: 13.8 E9/L (ref 1.8–7.3)
NEUTS SEG NFR BLD: 74.8 % (ref 43–80)
NEUTS SEG NFR BLD: 79.1 % (ref 43–80)
NRBC BLD-RTO: 0.9 /100 WBC
OVALOCYTES: ABNORMAL
PLATELET # BLD AUTO: 265 E9/L (ref 130–450)
PLATELET # BLD AUTO: 301 E9/L (ref 130–450)
PMV BLD AUTO: 10.5 FL (ref 7–12)
PMV BLD AUTO: 11 FL (ref 7–12)
POIKILOCYTES: ABNORMAL
POIKILOCYTES: ABNORMAL
POLYCHROMASIA: ABNORMAL
POLYCHROMASIA: ABNORMAL
POTASSIUM SERPL-SCNC: 3.7 MMOL/L (ref 3.5–5)
POTASSIUM SERPL-SCNC: 3.9 MMOL/L (ref 3.5–5)
PROT SERPL-MCNC: 6 G/DL (ref 6.4–8.3)
PROT SERPL-MCNC: 6.8 G/DL (ref 6.4–8.3)
PROTHROMBIN TIME: 25.2 SEC (ref 9.3–12.4)
RBC # BLD AUTO: 3.8 E12/L (ref 3.5–5.5)
RBC # BLD AUTO: 4.17 E12/L (ref 3.5–5.5)
SCHISTOCYTES: ABNORMAL
SCHISTOCYTES: ABNORMAL
SODIUM SERPL-SCNC: 136 MMOL/L (ref 132–146)
SODIUM SERPL-SCNC: 138 MMOL/L (ref 132–146)
TROPONIN, HIGH SENSITIVITY: 16 NG/L (ref 0–9)
WBC # BLD: 15.4 E9/L (ref 4.5–11.5)
WBC # BLD: 18.4 E9/L (ref 4.5–11.5)

## 2023-07-05 PROCEDURE — 84484 ASSAY OF TROPONIN QUANT: CPT

## 2023-07-05 PROCEDURE — 86850 RBC ANTIBODY SCREEN: CPT

## 2023-07-05 PROCEDURE — 99285 EMERGENCY DEPT VISIT HI MDM: CPT

## 2023-07-05 PROCEDURE — 85730 THROMBOPLASTIN TIME PARTIAL: CPT

## 2023-07-05 PROCEDURE — 93010 ELECTROCARDIOGRAM REPORT: CPT | Performed by: INTERNAL MEDICINE

## 2023-07-05 PROCEDURE — P9016 RBC LEUKOCYTES REDUCED: HCPCS

## 2023-07-05 PROCEDURE — 86900 BLOOD TYPING SEROLOGIC ABO: CPT

## 2023-07-05 PROCEDURE — 86901 BLOOD TYPING SEROLOGIC RH(D): CPT

## 2023-07-05 PROCEDURE — 85610 PROTHROMBIN TIME: CPT

## 2023-07-05 PROCEDURE — 93005 ELECTROCARDIOGRAM TRACING: CPT | Performed by: EMERGENCY MEDICINE

## 2023-07-05 PROCEDURE — 85025 COMPLETE CBC W/AUTO DIFF WBC: CPT

## 2023-07-05 PROCEDURE — 86923 COMPATIBILITY TEST ELECTRIC: CPT

## 2023-07-05 PROCEDURE — 80053 COMPREHEN METABOLIC PANEL: CPT

## 2023-07-05 RX ORDER — SODIUM CHLORIDE 9 MG/ML
INJECTION, SOLUTION INTRAVENOUS PRN
Status: DISCONTINUED | OUTPATIENT
Start: 2023-07-05 | End: 2023-07-06 | Stop reason: HOSPADM

## 2023-07-05 ASSESSMENT — PAIN - FUNCTIONAL ASSESSMENT
PAIN_FUNCTIONAL_ASSESSMENT: NONE - DENIES PAIN
PAIN_FUNCTIONAL_ASSESSMENT: NONE - DENIES PAIN

## 2023-07-05 ASSESSMENT — LIFESTYLE VARIABLES
HOW OFTEN DO YOU HAVE A DRINK CONTAINING ALCOHOL: NEVER
HOW MANY STANDARD DRINKS CONTAINING ALCOHOL DO YOU HAVE ON A TYPICAL DAY: PATIENT DOES NOT DRINK

## 2023-07-05 NOTE — ED PROVIDER NOTES
Pat        Pt Name: Sulma Moncada  MRN: [id number]  [address] [place] 1935  Date of evaluation: 7/5/2023  Provider: Scott Pool MD  PCP: Tez Moreno MD  Note Started: 12:45 PM EDT 7/5/23    CHIEF COMPLAINT       Chief Complaint   Patient presents with    Fatigue     Tired, weak, pale, dizzy, had AM labs Hgb 5.8 sent in for blood transfusion, DNRCC, +thinners, hx afib, denies bleeding in stool or anywhere       HISTORY OF PRESENT ILLNESS: 1 or more Elements        Limitations to history : None    Sulma Moncada is a 80 y.o. female who presents for fatigue and low blood counts. Patient says she is felt tired and was sent here for low blood hemoglobin. She denies bleeding. She has a history of A-fib on blood thinners. She denies dark black or bloody or tarry stools. She denies chest pain or shortness of breath. She said her dizziness was feeling lightheaded but this has resolved. She is DNR comfort care. She is here today and she would like to have a blood transfusion and be discharged back to the nursing facility. Nursing Notes were all reviewed and agreed with or any disagreements were addressed in the HPI. REVIEW OF EXTERNAL NOTE :       Labs and information from the nursing facility from today  Internal medicine discharge summary from December 17, 2022      Chart Review/External Note Review    Last Echo reviewed by Me:  Lab Results   Component Value Date    LVEF 75 12/20/2022             Controlled Substance Monitoring:    Acute and Chronic Pain Monitoring:   No flowsheet data found. REVIEW OF SYSTEMS :      Positives and Pertinent negatives as per HPI.      SURGICAL HISTORY     Past Surgical History:   Procedure Laterality Date    APPENDECTOMY      BACK SURGERY      CARDIAC CATHETERIZATION  02/10/2016    Dr. 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

## 2023-07-05 NOTE — CONSENT
Informed Consent for Blood Component Transfusion Note    I have discussed with the patient the rationale for blood component transfusion; its benefits in treating or preventing fatigue, organ damage, or death; and its risk which includes mild transfusion reactions, rare risk of blood borne infection, or more serious but rare reactions. I have discussed the alternatives to transfusion, including the risk and consequences of not receiving transfusion. The patient had an opportunity to ask questions and had agreed to proceed with transfusion of blood components.     Electronically signed by Hildegarde Landau, MD on 7/5/23 at 12:46 PM EDT

## 2023-07-06 NOTE — ED NOTES
Patient discharged per criteria, no questions at this time. Report to PAS. Attempted to call facility to give report no one reached. Patient transported with PAS back to facility.       Osvaldo Colunga RN  07/05/23 2672

## 2023-07-07 LAB
ANISOCYTOSIS: ABNORMAL
BASOPHILS # BLD: 0.15 E9/L (ref 0–0.2)
BASOPHILS NFR BLD: 0.9 % (ref 0–2)
EOSINOPHIL # BLD: 0.28 E9/L (ref 0.05–0.5)
EOSINOPHIL NFR BLD: 1.7 % (ref 0–6)
ERYTHROCYTE [DISTWIDTH] IN BLOOD BY AUTOMATED COUNT: 25.2 FL (ref 11.5–15)
HCT VFR BLD AUTO: 28.1 % (ref 34–48)
HGB BLD-MCNC: 7.6 G/DL (ref 11.5–15.5)
HYPOCHROMIA: ABNORMAL
LYMPHOCYTES # BLD: 1.98 E9/L (ref 1.5–4)
LYMPHOCYTES NFR BLD: 12.3 % (ref 20–42)
MCH RBC QN AUTO: 17.8 PG (ref 26–35)
MCHC RBC AUTO-ENTMCNC: 27 % (ref 32–34.5)
MCV RBC AUTO: 66 FL (ref 80–99.9)
MONOCYTES # BLD: 1.32 E9/L (ref 0.1–0.95)
MONOCYTES NFR BLD: 7.9 % (ref 2–12)
NEUTROPHILS # BLD: 12.71 E9/L (ref 1.8–7.3)
NEUTS SEG NFR BLD: 77.2 % (ref 43–80)
OVALOCYTES: ABNORMAL
PLATELET # BLD AUTO: 295 E9/L (ref 130–450)
PMV BLD AUTO: 10.4 FL (ref 7–12)
POIKILOCYTES: ABNORMAL
POLYCHROMASIA: ABNORMAL
RBC # BLD AUTO: 4.26 E12/L (ref 3.5–5.5)
WBC # BLD: 16.5 E9/L (ref 4.5–11.5)

## 2023-08-01 LAB
ALBUMIN SERPL-MCNC: 3.3 G/DL (ref 3.5–5.2)
ALP SERPL-CCNC: 91 U/L (ref 35–104)
ALT SERPL-CCNC: <5 U/L (ref 0–32)
ANION GAP SERPL CALCULATED.3IONS-SCNC: 12 MMOL/L (ref 7–16)
AST SERPL-CCNC: 23 U/L (ref 0–31)
BASOPHILS # BLD: 0.14 K/UL (ref 0–0.2)
BASOPHILS NFR BLD: 1 % (ref 0–2)
BILIRUB SERPL-MCNC: 0.4 MG/DL (ref 0–1.2)
BUN SERPL-MCNC: 15 MG/DL (ref 6–23)
CALCIUM SERPL-MCNC: 8.5 MG/DL (ref 8.6–10.2)
CHLORIDE SERPL-SCNC: 104 MMOL/L (ref 98–107)
CO2 SERPL-SCNC: 24 MMOL/L (ref 22–29)
CREAT SERPL-MCNC: 0.7 MG/DL (ref 0.5–1)
EOSINOPHIL # BLD: 0.76 K/UL (ref 0.05–0.5)
EOSINOPHILS RELATIVE PERCENT: 4 % (ref 0–6)
ERYTHROCYTE [DISTWIDTH] IN BLOOD BY AUTOMATED COUNT: 25.3 % (ref 11.5–15)
GFR SERPL CREATININE-BSD FRML MDRD: >60 ML/MIN/1.73M2
GLUCOSE SERPL-MCNC: 102 MG/DL (ref 74–99)
HCT VFR BLD AUTO: 30.1 % (ref 34–48)
HGB BLD-MCNC: 7.8 G/DL (ref 11.5–15.5)
IMM GRANULOCYTES # BLD AUTO: 0.14 K/UL (ref 0–0.58)
IMM GRANULOCYTES NFR BLD: 1 % (ref 0–5)
LYMPHOCYTES NFR BLD: 1.64 K/UL (ref 1.5–4)
LYMPHOCYTES RELATIVE PERCENT: 9 % (ref 20–42)
MCH RBC QN AUTO: 16.7 PG (ref 26–35)
MCHC RBC AUTO-ENTMCNC: 25.9 G/DL (ref 32–34.5)
MCV RBC AUTO: 64.5 FL (ref 80–99.9)
MONOCYTES NFR BLD: 15 % (ref 2–12)
MONOCYTES NFR BLD: 2.84 K/UL (ref 0.1–0.95)
NEUTROPHILS NFR BLD: 71 % (ref 43–80)
NEUTS SEG NFR BLD: 13.41 K/UL (ref 1.8–7.3)
PLATELET, FLUORESCENCE: 430 K/UL (ref 130–450)
PMV BLD AUTO: 9.9 FL (ref 7–12)
POTASSIUM SERPL-SCNC: 4.3 MMOL/L (ref 3.5–5)
PROT SERPL-MCNC: 6.1 G/DL (ref 6.4–8.3)
RBC # BLD AUTO: 4.67 M/UL (ref 3.5–5.5)
RBC # BLD: ABNORMAL 10*6/UL
SODIUM SERPL-SCNC: 140 MMOL/L (ref 132–146)
WBC OTHER # BLD: 18.9 K/UL (ref 4.5–11.5)

## 2023-08-28 LAB
AMORPH SED URNS QL MICRO: PRESENT
BACTERIA URNS QL MICRO: ABNORMAL
BILIRUB UR QL STRIP: NEGATIVE
CLARITY UR: ABNORMAL
COLOR UR: YELLOW
CRYSTALS URNS MICRO: ABNORMAL /HPF
GLUCOSE UR STRIP-MCNC: NEGATIVE MG/DL
HGB UR QL STRIP.AUTO: ABNORMAL
KETONES UR STRIP-MCNC: NEGATIVE MG/DL
LEUKOCYTE ESTERASE UR QL STRIP: ABNORMAL
NITRITE UR QL STRIP: NEGATIVE
PH UR STRIP: 8.5 [PH] (ref 5–9)
PROT UR STRIP-MCNC: ABNORMAL MG/DL
RBC #/AREA URNS HPF: ABNORMAL /HPF
SP GR UR STRIP: 1.01 (ref 1–1.03)
UROBILINOGEN UR STRIP-ACNC: 0.2 EU/DL (ref 0–1)
WBC #/AREA URNS HPF: ABNORMAL /HPF

## 2023-08-30 LAB
MICROORGANISM SPEC CULT: ABNORMAL
SPECIMEN DESCRIPTION: ABNORMAL

## 2023-09-01 LAB
ALBUMIN SERPL-MCNC: 3.3 G/DL (ref 3.5–5.2)
ALP SERPL-CCNC: 103 U/L (ref 35–104)
ALT SERPL-CCNC: 8 U/L (ref 0–32)
ANION GAP SERPL CALCULATED.3IONS-SCNC: 11 MMOL/L (ref 7–16)
AST SERPL-CCNC: 31 U/L (ref 0–31)
BASOPHILS # BLD: 0.13 K/UL (ref 0–0.2)
BASOPHILS NFR BLD: 1 % (ref 0–2)
BILIRUB SERPL-MCNC: 0.5 MG/DL (ref 0–1.2)
BUN SERPL-MCNC: 12 MG/DL (ref 6–23)
CALCIUM SERPL-MCNC: 8.5 MG/DL (ref 8.6–10.2)
CHLORIDE SERPL-SCNC: 102 MMOL/L (ref 98–107)
CO2 SERPL-SCNC: 22 MMOL/L (ref 22–29)
CREAT SERPL-MCNC: 0.6 MG/DL (ref 0.5–1)
EOSINOPHIL # BLD: 0.9 K/UL (ref 0.05–0.5)
EOSINOPHILS RELATIVE PERCENT: 6 % (ref 0–6)
ERYTHROCYTE [DISTWIDTH] IN BLOOD BY AUTOMATED COUNT: 22.4 % (ref 11.5–15)
GFR SERPL CREATININE-BSD FRML MDRD: >60 ML/MIN/1.73M2
GLUCOSE SERPL-MCNC: 67 MG/DL (ref 74–99)
HCT VFR BLD AUTO: 29.2 % (ref 34–48)
HGB BLD-MCNC: 7.7 G/DL (ref 11.5–15.5)
IMM GRANULOCYTES # BLD AUTO: 0.07 K/UL (ref 0–0.58)
IMM GRANULOCYTES NFR BLD: 1 % (ref 0–5)
LYMPHOCYTES NFR BLD: 2.59 K/UL (ref 1.5–4)
LYMPHOCYTES RELATIVE PERCENT: 18 % (ref 20–42)
MCH RBC QN AUTO: 16 PG (ref 26–35)
MCHC RBC AUTO-ENTMCNC: 26.4 G/DL (ref 32–34.5)
MCV RBC AUTO: 60.8 FL (ref 80–99.9)
MONOCYTES NFR BLD: 18 % (ref 2–12)
MONOCYTES NFR BLD: 2.59 K/UL (ref 0.1–0.95)
NEUTROPHILS NFR BLD: 57 % (ref 43–80)
NEUTS SEG NFR BLD: 8.46 K/UL (ref 1.8–7.3)
PLATELET, FLUORESCENCE: 346 K/UL (ref 130–450)
PMV BLD AUTO: ABNORMAL FL (ref 7–12)
POTASSIUM SERPL-SCNC: 4.6 MMOL/L (ref 3.5–5)
PROT SERPL-MCNC: 6.2 G/DL (ref 6.4–8.3)
RBC # BLD AUTO: 4.8 M/UL (ref 3.5–5.5)
RBC # BLD: ABNORMAL 10*6/UL
SODIUM SERPL-SCNC: 135 MMOL/L (ref 132–146)
WBC OTHER # BLD: 14.7 K/UL (ref 4.5–11.5)

## 2023-09-15 PROCEDURE — 93294 REM INTERROG EVL PM/LDLS PM: CPT | Performed by: INTERNAL MEDICINE

## 2023-09-15 PROCEDURE — 93296 REM INTERROG EVL PM/IDS: CPT | Performed by: INTERNAL MEDICINE

## 2023-10-17 LAB
ALBUMIN SERPL-MCNC: 3.4 G/DL (ref 3.5–5.2)
ALP SERPL-CCNC: 105 U/L (ref 35–104)
ALT SERPL-CCNC: 9 U/L (ref 0–32)
ANION GAP SERPL CALCULATED.3IONS-SCNC: 13 MMOL/L (ref 7–16)
AST SERPL-CCNC: 21 U/L (ref 0–31)
BASOPHILS # BLD: 0.11 K/UL (ref 0–0.2)
BASOPHILS NFR BLD: 1 % (ref 0–2)
BILIRUB SERPL-MCNC: 0.5 MG/DL (ref 0–1.2)
BUN SERPL-MCNC: 14 MG/DL (ref 6–23)
CALCIUM SERPL-MCNC: 8.4 MG/DL (ref 8.6–10.2)
CHLORIDE SERPL-SCNC: 103 MMOL/L (ref 98–107)
CO2 SERPL-SCNC: 21 MMOL/L (ref 22–29)
CREAT SERPL-MCNC: 0.6 MG/DL (ref 0.5–1)
EOSINOPHIL # BLD: 0.72 K/UL (ref 0.05–0.5)
EOSINOPHILS RELATIVE PERCENT: 5 % (ref 0–6)
ERYTHROCYTE [DISTWIDTH] IN BLOOD BY AUTOMATED COUNT: 21.8 % (ref 11.5–15)
GFR SERPL CREATININE-BSD FRML MDRD: >60 ML/MIN/1.73M2
GLUCOSE SERPL-MCNC: 103 MG/DL (ref 74–99)
HCT VFR BLD AUTO: 32.2 % (ref 34–48)
HGB BLD-MCNC: 8.5 G/DL (ref 11.5–15.5)
IMM GRANULOCYTES # BLD AUTO: 0.08 K/UL (ref 0–0.58)
IMM GRANULOCYTES NFR BLD: 1 % (ref 0–5)
IRON SATN MFR SERPL: 5 % (ref 15–50)
IRON SERPL-MCNC: 16 UG/DL (ref 37–145)
LYMPHOCYTES NFR BLD: 2.62 K/UL (ref 1.5–4)
LYMPHOCYTES RELATIVE PERCENT: 18 % (ref 20–42)
MCH RBC QN AUTO: 15.3 PG (ref 26–35)
MCHC RBC AUTO-ENTMCNC: 26.4 G/DL (ref 32–34.5)
MCV RBC AUTO: 57.9 FL (ref 80–99.9)
MONOCYTES NFR BLD: 18 % (ref 2–12)
MONOCYTES NFR BLD: 2.5 K/UL (ref 0.1–0.95)
NEUTROPHILS NFR BLD: 58 % (ref 43–80)
NEUTS SEG NFR BLD: 8.28 K/UL (ref 1.8–7.3)
PLATELET, FLUORESCENCE: 432 K/UL (ref 130–450)
PMV BLD AUTO: ABNORMAL FL (ref 7–12)
POTASSIUM SERPL-SCNC: 4.4 MMOL/L (ref 3.5–5)
PROT SERPL-MCNC: 6.3 G/DL (ref 6.4–8.3)
RBC # BLD AUTO: 5.56 M/UL (ref 3.5–5.5)
RBC # BLD: ABNORMAL 10*6/UL
SODIUM SERPL-SCNC: 137 MMOL/L (ref 132–146)
TIBC SERPL-MCNC: 349 UG/DL (ref 250–450)
WBC OTHER # BLD: 14.3 K/UL (ref 4.5–11.5)

## 2023-10-21 LAB
BACTERIA URNS QL MICRO: ABNORMAL
BILIRUB UR QL STRIP: NEGATIVE
CLARITY UR: ABNORMAL
COLOR UR: YELLOW
EPI CELLS #/AREA URNS HPF: ABNORMAL /HPF
GLUCOSE UR STRIP-MCNC: NEGATIVE MG/DL
HGB UR QL STRIP.AUTO: ABNORMAL
KETONES UR STRIP-MCNC: NEGATIVE MG/DL
LEUKOCYTE ESTERASE UR QL STRIP: ABNORMAL
NITRITE UR QL STRIP: POSITIVE
PH UR STRIP: 6.5 [PH] (ref 5–9)
PROT UR STRIP-MCNC: 30 MG/DL
RBC #/AREA URNS HPF: ABNORMAL /HPF
SP GR UR STRIP: 1.01 (ref 1–1.03)
UROBILINOGEN UR STRIP-ACNC: 0.2 EU/DL (ref 0–1)
WBC #/AREA URNS HPF: ABNORMAL /HPF

## 2023-10-22 LAB
MICROORGANISM SPEC CULT: ABNORMAL
MICROORGANISM SPEC CULT: ABNORMAL
SPECIMEN DESCRIPTION: ABNORMAL

## 2023-10-23 LAB
BASOPHILS # BLD: 0.13 K/UL (ref 0–0.2)
BASOPHILS NFR BLD: 1 % (ref 0–2)
EOSINOPHIL # BLD: 0.83 K/UL (ref 0.05–0.5)
EOSINOPHILS RELATIVE PERCENT: 6 % (ref 0–6)
ERYTHROCYTE [DISTWIDTH] IN BLOOD BY AUTOMATED COUNT: 22.1 % (ref 11.5–15)
HCT VFR BLD AUTO: 32.6 % (ref 34–48)
HGB BLD-MCNC: 8.5 G/DL (ref 11.5–15.5)
IMM GRANULOCYTES # BLD AUTO: 0.07 K/UL (ref 0–0.58)
IMM GRANULOCYTES NFR BLD: 1 % (ref 0–5)
LYMPHOCYTES NFR BLD: 3.1 K/UL (ref 1.5–4)
LYMPHOCYTES RELATIVE PERCENT: 21 % (ref 20–42)
MCH RBC QN AUTO: 15.3 PG (ref 26–35)
MCHC RBC AUTO-ENTMCNC: 26.1 G/DL (ref 32–34.5)
MCV RBC AUTO: 58.5 FL (ref 80–99.9)
MONOCYTES NFR BLD: 16 % (ref 2–12)
MONOCYTES NFR BLD: 2.4 K/UL (ref 0.1–0.95)
NEUTROPHILS NFR BLD: 56 % (ref 43–80)
NEUTS SEG NFR BLD: 8.39 K/UL (ref 1.8–7.3)
PLATELET, FLUORESCENCE: 465 K/UL (ref 130–450)
PMV BLD AUTO: ABNORMAL FL (ref 7–12)
RBC # BLD AUTO: 5.57 M/UL (ref 3.5–5.5)
RBC # BLD: ABNORMAL 10*6/UL
WBC OTHER # BLD: 14.9 K/UL (ref 4.5–11.5)

## 2023-10-25 LAB
ANION GAP SERPL CALCULATED.3IONS-SCNC: 9 MMOL/L (ref 7–16)
BUN SERPL-MCNC: 17 MG/DL (ref 6–23)
CALCIUM SERPL-MCNC: 8.4 MG/DL (ref 8.6–10.2)
CHLORIDE SERPL-SCNC: 102 MMOL/L (ref 98–107)
CO2 SERPL-SCNC: 24 MMOL/L (ref 22–29)
CREAT SERPL-MCNC: 0.6 MG/DL (ref 0.5–1)
GFR SERPL CREATININE-BSD FRML MDRD: >60 ML/MIN/1.73M2
GLUCOSE SERPL-MCNC: 94 MG/DL (ref 74–99)
POTASSIUM SERPL-SCNC: 4.9 MMOL/L (ref 3.5–5)
SODIUM SERPL-SCNC: 135 MMOL/L (ref 132–146)

## 2023-10-26 LAB
MICROORGANISM SPEC CULT: ABNORMAL
SPECIMEN DESCRIPTION: ABNORMAL

## 2023-11-01 LAB
ALBUMIN SERPL-MCNC: 3.3 G/DL (ref 3.5–5.2)
ALP SERPL-CCNC: 101 U/L (ref 35–104)
ALT SERPL-CCNC: 12 U/L (ref 0–32)
ANION GAP SERPL CALCULATED.3IONS-SCNC: 11 MMOL/L (ref 7–16)
AST SERPL-CCNC: 39 U/L (ref 0–31)
BASOPHILS # BLD: 0.45 K/UL (ref 0–0.2)
BASOPHILS NFR BLD: 3 % (ref 0–2)
BILIRUB SERPL-MCNC: 0.5 MG/DL (ref 0–1.2)
BUN SERPL-MCNC: 14 MG/DL (ref 6–23)
CALCIUM SERPL-MCNC: 8.5 MG/DL (ref 8.6–10.2)
CHLORIDE SERPL-SCNC: 102 MMOL/L (ref 98–107)
CO2 SERPL-SCNC: 21 MMOL/L (ref 22–29)
CREAT SERPL-MCNC: 0.7 MG/DL (ref 0.5–1)
EOSINOPHIL # BLD: 0.45 K/UL (ref 0.05–0.5)
EOSINOPHILS RELATIVE PERCENT: 3 % (ref 0–6)
ERYTHROCYTE [DISTWIDTH] IN BLOOD BY AUTOMATED COUNT: 30 % (ref 11.5–15)
GFR SERPL CREATININE-BSD FRML MDRD: >60 ML/MIN/1.73M2
GLUCOSE SERPL-MCNC: 85 MG/DL (ref 74–99)
HCT VFR BLD AUTO: 36 % (ref 34–48)
HGB BLD-MCNC: 9.9 G/DL (ref 11.5–15.5)
LYMPHOCYTES NFR BLD: 3.46 K/UL (ref 1.5–4)
LYMPHOCYTES RELATIVE PERCENT: 20 % (ref 20–42)
MCH RBC QN AUTO: 17 PG (ref 26–35)
MCHC RBC AUTO-ENTMCNC: 27.5 G/DL (ref 32–34.5)
MCV RBC AUTO: 62 FL (ref 80–99.9)
MONOCYTES NFR BLD: 17 % (ref 2–12)
MONOCYTES NFR BLD: 2.86 K/UL (ref 0.1–0.95)
NEUTROPHILS NFR BLD: 57 % (ref 43–80)
NEUTS SEG NFR BLD: 9.93 K/UL (ref 1.8–7.3)
PLATELET, FLUORESCENCE: 432 K/UL (ref 130–450)
PMV BLD AUTO: ABNORMAL FL (ref 7–12)
POTASSIUM SERPL-SCNC: 5.2 MMOL/L (ref 3.5–5)
PROMYELOCYTES ABSOLUTE COUNT: 0.15 K/UL
PROMYELOCYTES: 1 %
PROT SERPL-MCNC: 6.6 G/DL (ref 6.4–8.3)
RBC # BLD AUTO: 5.81 M/UL (ref 3.5–5.5)
RBC # BLD: ABNORMAL 10*6/UL
SODIUM SERPL-SCNC: 134 MMOL/L (ref 132–146)
WBC OTHER # BLD: 17.3 K/UL (ref 4.5–11.5)

## 2023-11-10 LAB
ALBUMIN SERPL-MCNC: 3.6 G/DL (ref 3.5–5.2)
ALP SERPL-CCNC: 101 U/L (ref 35–104)
ALT SERPL-CCNC: 11 U/L (ref 0–32)
ANION GAP SERPL CALCULATED.3IONS-SCNC: 9 MMOL/L (ref 7–16)
AST SERPL-CCNC: 28 U/L (ref 0–31)
BASOPHILS # BLD: 0 K/UL (ref 0–0.2)
BASOPHILS NFR BLD: 0 % (ref 0–2)
BILIRUB SERPL-MCNC: 0.5 MG/DL (ref 0–1.2)
BUN SERPL-MCNC: 17 MG/DL (ref 6–23)
CALCIUM SERPL-MCNC: 8.7 MG/DL (ref 8.6–10.2)
CHLORIDE SERPL-SCNC: 103 MMOL/L (ref 98–107)
CO2 SERPL-SCNC: 25 MMOL/L (ref 22–29)
CREAT SERPL-MCNC: 0.7 MG/DL (ref 0.5–1)
EOSINOPHIL # BLD: 0.47 K/UL (ref 0.05–0.5)
EOSINOPHILS RELATIVE PERCENT: 4 % (ref 0–6)
ERYTHROCYTE [DISTWIDTH] IN BLOOD BY AUTOMATED COUNT: ABNORMAL % (ref 11.5–15)
GFR SERPL CREATININE-BSD FRML MDRD: >60 ML/MIN/1.73M2
GLUCOSE SERPL-MCNC: 92 MG/DL (ref 74–99)
HCT VFR BLD AUTO: 40.7 % (ref 34–48)
HGB BLD-MCNC: 11.2 G/DL (ref 11.5–15.5)
LYMPHOCYTES NFR BLD: 2.68 K/UL (ref 1.5–4)
LYMPHOCYTES RELATIVE PERCENT: 20 % (ref 20–42)
MCH RBC QN AUTO: 19 PG (ref 26–35)
MCHC RBC AUTO-ENTMCNC: 27.5 G/DL (ref 32–34.5)
MCV RBC AUTO: 69.1 FL (ref 80–99.9)
MONOCYTES NFR BLD: 2.68 K/UL (ref 0.1–0.95)
MONOCYTES NFR BLD: 20 % (ref 2–12)
NEUTROPHILS NFR BLD: 57 % (ref 43–80)
NEUTS SEG NFR BLD: 7.57 K/UL (ref 1.8–7.3)
PLATELET, FLUORESCENCE: 452 K/UL (ref 130–450)
PMV BLD AUTO: ABNORMAL FL (ref 7–12)
POTASSIUM SERPL-SCNC: 4.1 MMOL/L (ref 3.5–5)
PROT SERPL-MCNC: 6.5 G/DL (ref 6.4–8.3)
RBC # BLD AUTO: 5.89 M/UL (ref 3.5–5.5)
RBC # BLD: ABNORMAL 10*6/UL
SODIUM SERPL-SCNC: 137 MMOL/L (ref 132–146)
WBC OTHER # BLD: 13.4 K/UL (ref 4.5–11.5)

## 2023-11-14 LAB
AMORPH SED URNS QL MICRO: PRESENT
BACTERIA URNS QL MICRO: ABNORMAL
BILIRUB UR QL STRIP: NEGATIVE
CLARITY UR: CLEAR
COLOR UR: YELLOW
EPI CELLS #/AREA URNS HPF: ABNORMAL /HPF
GLUCOSE UR STRIP-MCNC: NEGATIVE MG/DL
HGB UR QL STRIP.AUTO: ABNORMAL
KETONES UR STRIP-MCNC: NEGATIVE MG/DL
LEUKOCYTE ESTERASE UR QL STRIP: ABNORMAL
NITRITE UR QL STRIP: POSITIVE
PH UR STRIP: 5.5 [PH] (ref 5–9)
PROT UR STRIP-MCNC: NEGATIVE MG/DL
RBC #/AREA URNS HPF: ABNORMAL /HPF
SP GR UR STRIP: >1.03 (ref 1–1.03)
UROBILINOGEN UR STRIP-ACNC: 0.2 EU/DL (ref 0–1)
WBC #/AREA URNS HPF: ABNORMAL /HPF

## 2023-11-16 LAB
MICROORGANISM SPEC CULT: ABNORMAL
MICROORGANISM SPEC CULT: ABNORMAL
SPECIMEN DESCRIPTION: ABNORMAL

## 2023-11-20 LAB
BACTERIA URNS QL MICRO: ABNORMAL
BILIRUB UR QL STRIP: NEGATIVE
CLARITY UR: CLEAR
COLOR UR: YELLOW
GLUCOSE UR STRIP-MCNC: NEGATIVE MG/DL
HGB UR QL STRIP.AUTO: ABNORMAL
KETONES UR STRIP-MCNC: NEGATIVE MG/DL
LEUKOCYTE ESTERASE UR QL STRIP: ABNORMAL
NITRITE UR QL STRIP: POSITIVE
PH UR STRIP: 6 [PH] (ref 5–9)
PROT UR STRIP-MCNC: NEGATIVE MG/DL
RBC #/AREA URNS HPF: ABNORMAL /HPF
SP GR UR STRIP: 1.01 (ref 1–1.03)
UROBILINOGEN UR STRIP-ACNC: 0.2 EU/DL (ref 0–1)
WBC #/AREA URNS HPF: ABNORMAL /HPF

## 2023-11-22 LAB
MICROORGANISM SPEC CULT: ABNORMAL
MICROORGANISM SPEC CULT: ABNORMAL
SPECIMEN DESCRIPTION: ABNORMAL

## 2023-11-30 LAB
BILIRUB UR QL STRIP: NEGATIVE
CLARITY UR: ABNORMAL
COLOR UR: YELLOW
GLUCOSE UR STRIP-MCNC: NEGATIVE MG/DL
HGB UR QL STRIP.AUTO: ABNORMAL
KETONES UR STRIP-MCNC: NEGATIVE MG/DL
LEUKOCYTE ESTERASE UR QL STRIP: ABNORMAL
NITRITE UR QL STRIP: NEGATIVE
PH UR STRIP: 5.5 [PH] (ref 5–9)
PROT UR STRIP-MCNC: NEGATIVE MG/DL
SP GR UR STRIP: >1.03 (ref 1–1.03)
UROBILINOGEN UR STRIP-ACNC: 0.2 EU/DL (ref 0–1)

## 2023-12-02 LAB
MICROORGANISM SPEC CULT: ABNORMAL
SPECIMEN DESCRIPTION: ABNORMAL

## 2023-12-07 LAB
ALBUMIN SERPL-MCNC: 3.4 G/DL (ref 3.5–5.2)
ALP SERPL-CCNC: 103 U/L (ref 35–104)
ALT SERPL-CCNC: 16 U/L (ref 0–32)
ANION GAP SERPL CALCULATED.3IONS-SCNC: 14 MMOL/L (ref 7–16)
AST SERPL-CCNC: 34 U/L (ref 0–31)
BASOPHILS # BLD: 0.11 K/UL (ref 0–0.2)
BASOPHILS NFR BLD: 1 % (ref 0–2)
BILIRUB SERPL-MCNC: 0.4 MG/DL (ref 0–1.2)
BUN SERPL-MCNC: 20 MG/DL (ref 6–23)
CALCIUM SERPL-MCNC: 8.6 MG/DL (ref 8.6–10.2)
CHLORIDE SERPL-SCNC: 103 MMOL/L (ref 98–107)
CO2 SERPL-SCNC: 22 MMOL/L (ref 22–29)
CREAT SERPL-MCNC: 0.7 MG/DL (ref 0.5–1)
EOSINOPHIL # BLD: 1.03 K/UL (ref 0.05–0.5)
EOSINOPHILS RELATIVE PERCENT: 6 % (ref 0–6)
ERYTHROCYTE [DISTWIDTH] IN BLOOD BY AUTOMATED COUNT: ABNORMAL % (ref 11.5–15)
GFR SERPL CREATININE-BSD FRML MDRD: >60 ML/MIN/1.73M2
GLUCOSE SERPL-MCNC: 92 MG/DL (ref 74–99)
HCT VFR BLD AUTO: 41.7 % (ref 34–48)
HGB BLD-MCNC: 12.4 G/DL (ref 11.5–15.5)
IMM GRANULOCYTES # BLD AUTO: 0.09 K/UL (ref 0–0.58)
IMM GRANULOCYTES NFR BLD: 1 % (ref 0–5)
LYMPHOCYTES NFR BLD: 2.33 K/UL (ref 1.5–4)
LYMPHOCYTES RELATIVE PERCENT: 14 % (ref 20–42)
MCH RBC QN AUTO: 22.4 PG (ref 26–35)
MCHC RBC AUTO-ENTMCNC: 29.7 G/DL (ref 32–34.5)
MCV RBC AUTO: 75.4 FL (ref 80–99.9)
MONOCYTES NFR BLD: 21 % (ref 2–12)
MONOCYTES NFR BLD: 3.36 K/UL (ref 0.1–0.95)
NEUTROPHILS NFR BLD: 57 % (ref 43–80)
NEUTS SEG NFR BLD: 9.23 K/UL (ref 1.8–7.3)
PLATELET # BLD AUTO: 297 K/UL (ref 130–450)
PMV BLD AUTO: ABNORMAL FL (ref 7–12)
POTASSIUM SERPL-SCNC: 4.7 MMOL/L (ref 3.5–5)
PROT SERPL-MCNC: 6.4 G/DL (ref 6.4–8.3)
RBC # BLD AUTO: 5.53 M/UL (ref 3.5–5.5)
SODIUM SERPL-SCNC: 139 MMOL/L (ref 132–146)
WBC OTHER # BLD: 16.2 K/UL (ref 4.5–11.5)

## 2023-12-11 LAB
BILIRUB UR QL STRIP: NEGATIVE
CLARITY UR: CLEAR
COLOR UR: YELLOW
GLUCOSE UR STRIP-MCNC: NEGATIVE MG/DL
HGB UR QL STRIP.AUTO: ABNORMAL
KETONES UR STRIP-MCNC: NEGATIVE MG/DL
LEUKOCYTE ESTERASE UR QL STRIP: ABNORMAL
NITRITE UR QL STRIP: NEGATIVE
PH UR STRIP: 6 [PH] (ref 5–9)
PROT UR STRIP-MCNC: NEGATIVE MG/DL
SP GR UR STRIP: >1.03 (ref 1–1.03)
UROBILINOGEN UR STRIP-ACNC: 0.2 EU/DL (ref 0–1)

## 2023-12-13 LAB
MICROORGANISM SPEC CULT: ABNORMAL
MICROORGANISM SPEC CULT: ABNORMAL
SPECIMEN DESCRIPTION: ABNORMAL

## 2023-12-15 LAB
25(OH)D3 SERPL-MCNC: 48.6 NG/ML (ref 30–100)
ALBUMIN SERPL-MCNC: 3.7 G/DL (ref 3.5–5.2)
ALP SERPL-CCNC: 108 U/L (ref 35–104)
ALT SERPL-CCNC: 14 U/L (ref 0–32)
ANION GAP SERPL CALCULATED.3IONS-SCNC: 13 MMOL/L (ref 7–16)
AST SERPL-CCNC: 29 U/L (ref 0–31)
BASOPHILS # BLD: 0.1 K/UL (ref 0–0.2)
BASOPHILS NFR BLD: 1 % (ref 0–2)
BILIRUB SERPL-MCNC: 0.4 MG/DL (ref 0–1.2)
BUN SERPL-MCNC: 16 MG/DL (ref 6–23)
CALCIUM SERPL-MCNC: 8.8 MG/DL (ref 8.6–10.2)
CHLORIDE SERPL-SCNC: 103 MMOL/L (ref 98–107)
CHOLEST SERPL-MCNC: 132 MG/DL
CO2 SERPL-SCNC: 24 MMOL/L (ref 22–29)
CREAT SERPL-MCNC: 0.7 MG/DL (ref 0.5–1)
EOSINOPHIL # BLD: 0.66 K/UL (ref 0.05–0.5)
EOSINOPHILS RELATIVE PERCENT: 4 % (ref 0–6)
ERYTHROCYTE [DISTWIDTH] IN BLOOD BY AUTOMATED COUNT: ABNORMAL % (ref 11.5–15)
FOLATE SERPL-MCNC: >20 NG/ML (ref 4.8–24.2)
GFR SERPL CREATININE-BSD FRML MDRD: >60 ML/MIN/1.73M2
GLUCOSE SERPL-MCNC: 77 MG/DL (ref 74–99)
HCT VFR BLD AUTO: 43.1 % (ref 34–48)
HDLC SERPL-MCNC: 38 MG/DL
HGB BLD-MCNC: 13 G/DL (ref 11.5–15.5)
IMM GRANULOCYTES # BLD AUTO: 0.08 K/UL (ref 0–0.58)
IMM GRANULOCYTES NFR BLD: 1 % (ref 0–5)
LDLC SERPL CALC-MCNC: 78 MG/DL
LYMPHOCYTES NFR BLD: 2.34 K/UL (ref 1.5–4)
LYMPHOCYTES RELATIVE PERCENT: 16 % (ref 20–42)
MCH RBC QN AUTO: 23.1 PG (ref 26–35)
MCHC RBC AUTO-ENTMCNC: 30.2 G/DL (ref 32–34.5)
MCV RBC AUTO: 76.6 FL (ref 80–99.9)
MONOCYTES NFR BLD: 22 % (ref 2–12)
MONOCYTES NFR BLD: 3.24 K/UL (ref 0.1–0.95)
NEUTROPHILS NFR BLD: 57 % (ref 43–80)
NEUTS SEG NFR BLD: 8.67 K/UL (ref 1.8–7.3)
PLATELET, FLUORESCENCE: 300 K/UL (ref 130–450)
PMV BLD AUTO: ABNORMAL FL (ref 7–12)
POTASSIUM SERPL-SCNC: 3.9 MMOL/L (ref 3.5–5)
PROT SERPL-MCNC: 6.5 G/DL (ref 6.4–8.3)
RBC # BLD AUTO: 5.63 M/UL (ref 3.5–5.5)
RBC # BLD: ABNORMAL 10*6/UL
SODIUM SERPL-SCNC: 140 MMOL/L (ref 132–146)
T4 SERPL-MCNC: 8.4 UG/DL (ref 4.5–11.7)
TRIGL SERPL-MCNC: 82 MG/DL
TSH SERPL DL<=0.05 MIU/L-ACNC: 1.74 UIU/ML (ref 0.27–4.2)
VIT B12 SERPL-MCNC: 1152 PG/ML (ref 211–946)
VLDLC SERPL CALC-MCNC: 16 MG/DL
WBC OTHER # BLD: 15.1 K/UL (ref 4.5–11.5)

## 2023-12-15 PROCEDURE — 93294 REM INTERROG EVL PM/LDLS PM: CPT | Performed by: INTERNAL MEDICINE

## 2023-12-15 PROCEDURE — 93296 REM INTERROG EVL PM/IDS: CPT | Performed by: INTERNAL MEDICINE

## 2024-02-01 LAB
ALBUMIN SERPL-MCNC: 3.6 G/DL (ref 3.5–5.2)
ALP SERPL-CCNC: 101 U/L (ref 35–104)
ALT SERPL-CCNC: 16 U/L (ref 0–32)
ANION GAP SERPL CALCULATED.3IONS-SCNC: 12 MMOL/L (ref 7–16)
AST SERPL-CCNC: 33 U/L (ref 0–31)
BASOPHILS # BLD: 0 K/UL (ref 0–0.2)
BASOPHILS NFR BLD: 0 % (ref 0–2)
BILIRUB SERPL-MCNC: 0.4 MG/DL (ref 0–1.2)
BUN SERPL-MCNC: 17 MG/DL (ref 6–23)
CALCIUM SERPL-MCNC: 9 MG/DL (ref 8.6–10.2)
CHLORIDE SERPL-SCNC: 106 MMOL/L (ref 98–107)
CO2 SERPL-SCNC: 23 MMOL/L (ref 22–29)
CREAT SERPL-MCNC: 0.7 MG/DL (ref 0.5–1)
EOSINOPHIL # BLD: 0 K/UL (ref 0.05–0.5)
EOSINOPHILS RELATIVE PERCENT: 0 % (ref 0–6)
ERYTHROCYTE [DISTWIDTH] IN BLOOD BY AUTOMATED COUNT: 16.3 % (ref 11.5–15)
GFR SERPL CREATININE-BSD FRML MDRD: >60 ML/MIN/1.73M2
GLUCOSE SERPL-MCNC: 106 MG/DL (ref 74–99)
HCT VFR BLD AUTO: 48.1 % (ref 34–48)
HGB BLD-MCNC: 15 G/DL (ref 11.5–15.5)
LYMPHOCYTES NFR BLD: 2.44 K/UL (ref 1.5–4)
LYMPHOCYTES RELATIVE PERCENT: 18 % (ref 20–42)
MCH RBC QN AUTO: 24.2 PG (ref 26–35)
MCHC RBC AUTO-ENTMCNC: 31.2 G/DL (ref 32–34.5)
MCV RBC AUTO: 77.7 FL (ref 80–99.9)
MONOCYTES NFR BLD: 18 % (ref 2–12)
MONOCYTES NFR BLD: 2.44 K/UL (ref 0.1–0.95)
NEUTROPHILS NFR BLD: 65 % (ref 43–80)
NEUTS SEG NFR BLD: 9.02 K/UL (ref 1.8–7.3)
PLATELET, FLUORESCENCE: 183 K/UL (ref 130–450)
PMV BLD AUTO: ABNORMAL FL (ref 7–12)
POTASSIUM SERPL-SCNC: 4.4 MMOL/L (ref 3.5–5)
PROT SERPL-MCNC: 6.7 G/DL (ref 6.4–8.3)
RBC # BLD AUTO: 6.19 M/UL (ref 3.5–5.5)
RBC # BLD: NORMAL 10*6/UL
SODIUM SERPL-SCNC: 141 MMOL/L (ref 132–146)
WBC OTHER # BLD: 13.9 K/UL (ref 4.5–11.5)

## 2024-03-11 ENCOUNTER — TELEPHONE (OUTPATIENT)
Dept: NON INVASIVE DIAGNOSTICS | Age: 89
End: 2024-03-11

## 2024-03-11 NOTE — TELEPHONE ENCOUNTER
----- Message from Kylah Lyle MD sent at 3/11/2024 12:36 PM EDT -----  Regarding: RE: in office visit issue  She should be seen in office once a year but whatever the family prefers. Thanks.  ----- Message -----  From: Chantelle Craven MA  Sent: 3/11/2024  11:37 AM EDT  To: Kylah Lyle MD; Irlanda Blanchard RN; #  Subject: in office visit issue                            Brady TYLER - He cancelled the scheduled office visit on 03/15/2024 @ 10:30 AM with Leanne. Brady states the patient is on \"total care\"  but not hospice at the [facility] in [place], and it is an inconvenience to her to travel to our office. The patient does have a transmitter at the facility and Brady wants to know if that is sufficient for ongoing monitoring instead of in office visits. Please advise.    Electronically signed by Chantelle Craven MA on 3/11/2024 at 11:37 AM

## 2024-03-11 NOTE — TELEPHONE ENCOUNTER
Brady notified of Dr. Lyle's recommendation and verbalized understanding.    Electronically signed by Chantelle Craven MA on 3/11/2024 at 1:09 PM

## 2024-03-11 NOTE — TELEPHONE ENCOUNTER
Brady - POA - He cancelled the scheduled office visit on 03/15/2024 @ 10:30 AM with Leanne. Brady states the patient is on \"total care\"  but not hospice at the [facility] in [place], and it is an inconvenience to her to travel to our office. The patient does have a transmitter at the facility and Brady wants to know if that is sufficient for ongoing monitoring instead of in office visits. Message sent to Dr. Lyle and device clinic.     Electronically signed by Chantelle Craven MA on 3/11/2024 at 11:36 AM

## 2024-05-01 LAB
ADDITIONAL COMMENT:: NORMAL
ALBUMIN SERPL-MCNC: 4.4 G/DL (ref 3.5–5.2)
ALP SERPL-CCNC: 106 U/L (ref 35–104)
ALT SERPL-CCNC: 18 U/L (ref 0–32)
ANION GAP SERPL CALCULATED.3IONS-SCNC: 19 MMOL/L (ref 7–16)
AST SERPL-CCNC: 34 U/L (ref 0–31)
BASOPHILS # BLD: 0 K/UL (ref 0–0.2)
BASOPHILS NFR BLD: 0 % (ref 0–2)
BILIRUB SERPL-MCNC: 0.8 MG/DL (ref 0–1.2)
BLASTS ABSOLUTE COUNT: 2.83 K/UL
BLASTS: 18 %
BUN SERPL-MCNC: 16 MG/DL (ref 6–23)
CALCIUM SERPL-MCNC: 9.5 MG/DL (ref 8.6–10.2)
CHLORIDE SERPL-SCNC: 100 MMOL/L (ref 98–107)
CO2 SERPL-SCNC: 21 MMOL/L (ref 22–29)
CREAT SERPL-MCNC: 0.7 MG/DL (ref 0.5–1)
EOSINOPHIL # BLD: 0 K/UL (ref 0.05–0.5)
EOSINOPHILS RELATIVE PERCENT: 0 % (ref 0–6)
ERYTHROCYTE [DISTWIDTH] IN BLOOD BY AUTOMATED COUNT: 19.8 % (ref 11.5–15)
GFR, ESTIMATED: 81 ML/MIN/1.73M2
GLUCOSE SERPL-MCNC: 132 MG/DL (ref 74–99)
HCT VFR BLD AUTO: 49.4 % (ref 34–48)
HGB BLD-MCNC: 15.5 G/DL (ref 11.5–15.5)
LYMPHOCYTES NFR BLD: 5.5 K/UL (ref 1.5–4)
LYMPHOCYTES RELATIVE PERCENT: 35 % (ref 20–42)
MCH RBC QN AUTO: 25.7 PG (ref 26–35)
MCHC RBC AUTO-ENTMCNC: 31.4 G/DL (ref 32–34.5)
MCV RBC AUTO: 82.1 FL (ref 80–99.9)
MONOCYTES NFR BLD: 0.47 K/UL (ref 0.1–0.95)
MONOCYTES NFR BLD: 3 % (ref 2–12)
NEUTROPHILS NFR BLD: 44 % (ref 43–80)
NEUTS SEG NFR BLD: 6.91 K/UL (ref 1.8–7.3)
PLATELET, FLUORESCENCE: 193 K/UL (ref 130–450)
PMV BLD AUTO: ABNORMAL FL (ref 7–12)
POTASSIUM SERPL-SCNC: 4.4 MMOL/L (ref 3.5–5)
PROT SERPL-MCNC: 7.6 G/DL (ref 6.4–8.3)
RBC # BLD AUTO: 6.02 M/UL (ref 3.5–5.5)
RBC # BLD: NORMAL 10*6/UL
SODIUM SERPL-SCNC: 140 MMOL/L (ref 132–146)
WBC OTHER # BLD: 15.7 K/UL (ref 4.5–11.5)

## 2024-06-19 LAB
AMORPH SED URNS QL MICRO: PRESENT
BACTERIA URNS QL MICRO: ABNORMAL
BILIRUB UR QL STRIP: NEGATIVE
CLARITY UR: ABNORMAL
COLOR UR: YELLOW
CRYSTALS URNS MICRO: ABNORMAL /HPF
EPI CELLS #/AREA URNS HPF: ABNORMAL /HPF
GLUCOSE UR STRIP-MCNC: NEGATIVE MG/DL
HGB UR QL STRIP.AUTO: ABNORMAL
KETONES UR STRIP-MCNC: NEGATIVE MG/DL
LEUKOCYTE ESTERASE UR QL STRIP: ABNORMAL
NITRITE UR QL STRIP: POSITIVE
PH UR STRIP: 7.5 [PH] (ref 5–9)
PROT UR STRIP-MCNC: ABNORMAL MG/DL
RBC #/AREA URNS HPF: ABNORMAL /HPF
SP GR UR STRIP: 1.02 (ref 1–1.03)
UROBILINOGEN UR STRIP-ACNC: 0.2 EU/DL (ref 0–1)
WBC #/AREA URNS HPF: ABNORMAL /HPF

## 2024-06-23 LAB
MICROORGANISM SPEC CULT: ABNORMAL
MICROORGANISM SPEC CULT: ABNORMAL
SPECIMEN DESCRIPTION: ABNORMAL

## 2024-08-05 ENCOUNTER — TELEPHONE (OUTPATIENT)
Dept: NON INVASIVE DIAGNOSTICS | Age: 89
End: 2024-08-05

## 2024-08-05 NOTE — TELEPHONE ENCOUNTER
I tried calling the nursing staff at [facility] in [place] regarding patient's code status and medication. On 3/11/24 patient's POA, Brady, stated it was too difficult to move the patient for an office visit. We have received Merlin alerts for episodes of AF and continued high RV and RA capture thresholds. I will try to contact the nursing staff again.    Irlanda Blanchard RN, BSN  [facility]   Device Clinic

## 2025-06-04 NOTE — ED PROVIDER NOTES
[FreeTextEntry1] : here hayden she feels a bulge in her vagina that bothers her  also she has trouble emptying her bladder when she voids  osteoarthropathy L3-S1.   2. Vascular calcifications abdominal aorta. 3. Fecal retention throughout colon. ED Course / Medical Decision Making     Medications   dexamethasone (DECADRON) injection 6 mg (has no administration in time range)        Re-examination:  7/23/19       Time: 1315   Results discussed. Steroids and d/c. Consult(s):   None    Procedure(s):   none    MDM:   Patient presents to the emergency department for right-sided low back/right hip pain. Radiographs are unremarkable for acute abnormality. Patient has a large list of allergies in the knee joint decision-making process we have elected to treat her with steroids and she is to follow-up with her orthopedic surgeon next Monday. She already has an appointment scheduled for next Monday. Specific conditions for emergent return have been discussed and the patient verbalized understanding to return immediately for new or worsening symptoms. Counseling: The emergency provider has spoken with the patient and discussed todays results, in addition to providing specific details for the plan of care and counseling regarding the diagnosis and prognosis. Questions are answered at this time and they are agreeable with the plan. Assessment      1. Acute exacerbation of chronic low back pain    2. Chronic right hip pain      Plan   Discharge to home  Patient condition is good    New Medications     New Prescriptions    PREDNISONE (DELTASONE) 10 MG TABLET    Take 2 tablets by mouth daily for 5 days     Electronically signed by Ragini Smith PA-C   DD: 7/23/19  **This report was transcribed using voice recognition software. Every effort was made to ensure accuracy; however, inadvertent computerized transcription errors may be present.   END OF ED PROVIDER NOTE        Ragini Smith PA-C  07/23/19 [id number]

## (undated) DEVICE — SYRINGE, LUER LOCK, 10ML: Brand: MEDLINE

## (undated) DEVICE — GOWN,SIRUS,POLYRNF,BRTHSLV,XL,30/CS: Brand: MEDLINE

## (undated) DEVICE — TOTAL TRAY, DB, 100% SILI FOLEY, 16FR 10: Brand: MEDLINE

## (undated) DEVICE — GOWN,SIRUS,FABRNF,XL,20/CS: Brand: MEDLINE

## (undated) DEVICE — SPONGE,DRAIN,NONWVN,4"X4",6PLY,STRL,LF: Brand: MEDLINE

## (undated) DEVICE — LENS CYSTOSCOPE 12 DEG

## (undated) DEVICE — STRIP,CLOSURE,WOUND,MEDI-STRIP,1/2X4: Brand: MEDLINE

## (undated) DEVICE — BAG DRNGE COMB PK

## (undated) DEVICE — SET CYSTOSCOPE 21FR

## (undated) DEVICE — GAUZE,SPONGE,4"X4",8PLY,STRL,LF,10/TRAY: Brand: MEDLINE

## (undated) DEVICE — DRAPE,TOP,102X53,STERILE: Brand: MEDLINE

## (undated) DEVICE — DRESSING FOAM W6.3XL7.9IN TAN SACR SELF ADH MEPILEX BORD

## (undated) DEVICE — SUTURE VCRL L48IN 2 0 VLT BRAID CART ABSRB CNPJ482C

## (undated) DEVICE — 1000 S-DRAPE TOWEL DRAPE 10/BX: Brand: STERI-DRAPE™

## (undated) DEVICE — CAMERA ENDOSCP VID HI DEF GEN

## (undated) DEVICE — Z DISCONTINUED PER MEDLINE USE 2741943 DRESSING AQUACEL 10 IN ALG W9XL25CM SIL CVR WTRPRF VIR BACT BARR ANTIMIC

## (undated) DEVICE — BOWL AND CEMENT CARTRIDGE WITH BREAKAWAY FEMORAL NOZZLE: Brand: ACM

## (undated) DEVICE — DRAINBAG,ANTI-REFLUX TOWER,L/F,2000ML,LL: Brand: MEDLINE

## (undated) DEVICE — Y-TYPE TUR/BLADDER IRRIGATION SET, REGULATING CLAMP

## (undated) DEVICE — 4-PORT MANIFOLD: Brand: NEPTUNE 2

## (undated) DEVICE — TOWEL,OR,DSP,ST,BLUE,STD,6/PK,12PK/CS: Brand: MEDLINE

## (undated) DEVICE — CONVERTORS STOCKINETTE: Brand: CONVERTORS

## (undated) DEVICE — COUNTER NDL 30 COUNT DBL MAG

## (undated) DEVICE — SYRINGE 20ML LL S/C 50

## (undated) DEVICE — PACK PROCEDURE SURG GEN CUST

## (undated) DEVICE — MARKER,SKIN,WI/RULER AND LABELS: Brand: MEDLINE

## (undated) DEVICE — BASIC SINGLE BASIN 1-LF: Brand: MEDLINE INDUSTRIES, INC.

## (undated) DEVICE — SOLUTION IV IRRIG POUR BRL 0.9% SODIUM CHL 2F7124

## (undated) DEVICE — KIT SURG W7XL11IN 2 PKT UNTREATED NA

## (undated) DEVICE — NEEDLE FLTR 18GA L1.5IN MEM THK5UM BLNT DISP

## (undated) DEVICE — TUBING, SUCTION, 9/32" X 10', STRAIGHT: Brand: MEDLINE

## (undated) DEVICE — SHOECOVER ANTI-SKID: Brand: CARDINAL HEALTH

## (undated) DEVICE — TIP SGL SPRY BIOMATERIALS OPN AND ENDO CONCL SPRY OUTPT

## (undated) DEVICE — GLOVE ORANGE PI 7 1/2   MSG9075

## (undated) DEVICE — TOTAL HIP PK

## (undated) DEVICE — E-Z CLEAN, NON-STICK, PTFE COATED, ELECTROSURGICAL BLADE ELECTRODE, 6.5 INCH (16.5 CM): Brand: MEGADYNE

## (undated) DEVICE — SOLUTION IV IRRIG WATER 1000ML POUR BRL 2F7114

## (undated) DEVICE — HOSE CONN FOR WST MGMT SYS NEPTUNE 2

## (undated) DEVICE — TUBING, SUCTION, 3/16" X 12', STRAIGHT: Brand: MEDLINE

## (undated) DEVICE — ELECTRODE PT RET AD L9FT HI MOIST COND ADH HYDRGEL CORDED

## (undated) DEVICE — STERILE PVP: Brand: MEDLINE INDUSTRIES, INC.

## (undated) DEVICE — DRAPE,REIN 53X77,STERILE: Brand: MEDLINE

## (undated) DEVICE — TUBE IRRIG HNDPC HI FLO TP INTRPULS W/SUCTION TUBE

## (undated) DEVICE — PILLOW POS W15XH6XL22IN RASPBERRY FOAM ABD W/ STRP DISP FOR

## (undated) DEVICE — Z INACTIVE USE 2635503 SOLUTION IRRIG 3000ML ST H2O USP UROMATIC PLAS CONT

## (undated) DEVICE — 3M™ STERI-DRAPE™ U-DRAPE 1015: Brand: STERI-DRAPE™

## (undated) DEVICE — SET SURG BASIN MAYO REUSABLE

## (undated) DEVICE — CYSTO PACK: Brand: MEDLINE INDUSTRIES, INC.

## (undated) DEVICE — INTENDED FOR TISSUE SEPARATION, AND OTHER PROCEDURES THAT REQUIRE A SHARP SURGICAL BLADE TO PUNCTURE OR CUT.: Brand: BARD-PARKER ® STAINLESS STEEL BLADES

## (undated) DEVICE — LENS CYSTOSCOPE 30 DEG

## (undated) DEVICE — SPONGE LAP W18XL18IN WHT COT 4 PLY FLD STRUNG RADPQ DISP ST

## (undated) DEVICE — CATHETER,FOLEY,SILI-ELAST,LTX,30FR,10ML: Brand: MEDLINE

## (undated) DEVICE — DOUBLE BASIN SET: Brand: MEDLINE INDUSTRIES, INC.

## (undated) DEVICE — COAXIAL FEMORAL CANAL TIP

## (undated) DEVICE — STANDARD HYPODERMIC NEEDLE,POLYPROPYLENE HUB: Brand: MONOJECT

## (undated) DEVICE — SOLUTION IV IRRIG 500ML 0.9% SODIUM CHL 2F7123

## (undated) DEVICE — SOLUTION IV 1000ML 0.9% SOD CHL PH 5 INJ USP VIAFLX PLAS

## (undated) DEVICE — SOLUTION SURG PREP ANTIMICROBIAL 4 OZ SKIN WND EXIDINE

## (undated) DEVICE — DISPOSABLE FEMORAL CEMENT                                    COMPRESSOR CAP STERILE

## (undated) DEVICE — 3M™ IOBAN™ 2 ANTIMICROBIAL INCISE DRAPE 6650EZ: Brand: IOBAN™ 2

## (undated) DEVICE — CHLORAPREP 26ML ORANGE

## (undated) DEVICE — SUTURE STRATAFIX SYMMETRIC SZ 1 L18IN ABSRB VLT CT1 L36CM SXPP1A404

## (undated) DEVICE — BLADE SAW W11XL77.5MM THK1.23MM CUT THK1.17MM S STL RECIP